# Patient Record
Sex: FEMALE | Race: WHITE | NOT HISPANIC OR LATINO | Employment: FULL TIME | URBAN - METROPOLITAN AREA
[De-identification: names, ages, dates, MRNs, and addresses within clinical notes are randomized per-mention and may not be internally consistent; named-entity substitution may affect disease eponyms.]

---

## 2017-06-22 ENCOUNTER — TRANSCRIBE ORDERS (OUTPATIENT)
Dept: RADIOLOGY | Facility: CLINIC | Age: 62
End: 2017-06-22

## 2017-06-22 ENCOUNTER — APPOINTMENT (OUTPATIENT)
Dept: RADIOLOGY | Facility: CLINIC | Age: 62
End: 2017-06-22
Payer: COMMERCIAL

## 2017-06-22 DIAGNOSIS — M25.552 LEFT HIP PAIN: Primary | ICD-10-CM

## 2017-06-22 PROCEDURE — 73503 X-RAY EXAM HIP UNI 4/> VIEWS: CPT

## 2019-08-19 ENCOUNTER — APPOINTMENT (OUTPATIENT)
Dept: RADIOLOGY | Facility: CLINIC | Age: 64
End: 2019-08-19
Payer: COMMERCIAL

## 2019-08-19 DIAGNOSIS — R05.9 COUGH: ICD-10-CM

## 2019-08-19 PROCEDURE — 71046 X-RAY EXAM CHEST 2 VIEWS: CPT

## 2022-01-11 ENCOUNTER — TELEPHONE (OUTPATIENT)
Dept: HEMATOLOGY ONCOLOGY | Facility: CLINIC | Age: 67
End: 2022-01-11

## 2022-01-11 NOTE — TELEPHONE ENCOUNTER
Soft Intake Form   Patient Details   Palomor Loge     1955     [id number]     Reason For Appointment   Who is Calling? Patient   If not patient, Name? Who is the Referring Doctor? Dr Amando Romero OB/GYN     What is the diagnosis? uterine cancer   Has this diagnosis been confirmed by a biopsy/surgery? If yes, what is the date it was done? Yes  1/04/2022     Biopsy done at [address]? If not, where was it done? No  [facility]  [address] [facility]  [address], [place], [address]     Was imaging done, and was it done at [address] [place] [address]? If not, where was it done? Yes  [address]  [address]  [facility], [facility]     Have you been seen by another Oncologist?  If so, who and where (name of facility, city and state) no   For 2nd Opinions Only: Are you currently undergoing treatment, or are you scheduled to start treatment? If yes, name of facility, city and state n/a    For "History Of" only: Have you completed treatment? n/a    Have you had Genetic Testing done in the past?  If so, advise to bring test results to their visit no   Record Gathering Information   Did you advise to have records faxed to [phone]? yes   Did you advise to have disks sent to the proper address with imaging? ("History of" Patients)  5 years of imaging for breast patients-Mammos, US etc Yes  [address]  1st floor   [place], [address] [facility]   Scheduling Information   What is the best call back number? (If the [facility] is calling, please use their number) [phone]   Miscellaneous Information      Patient calling in to schedule new patient appointment  Request appointment with Dr Antonio Ramsey in [place], offer first available appointment  Advise Patient she will need to have all records fax , provided fax number also provided address to send images    Schedule appointment 2/10/2022 1:30pm

## 2022-01-25 ENCOUNTER — TELEPHONE (OUTPATIENT)
Dept: GYNECOLOGIC ONCOLOGY | Facility: CLINIC | Age: 67
End: 2022-01-25

## 2022-01-25 NOTE — TELEPHONE ENCOUNTER
 Hello, can I please speak to (patient name) this is (enter your name here) calling from [facility]) to remind you of your appointment on (date and time) at (location)  I am calling to review our no-show/cancelation policy and complete your COVID screening questions  Do you have a few minutes? We ask that you come at least 15 minutes early for your appointment to complete all paperwork, if you are 20 minutes late for your appointment, we may need to reschedule you  We require at least 24-hour notice for cancelations and if you miss your appointment 3 times, we may unfortunately not be able to reschedule your future visit  Considering the current events related to the COVID-19 virus and in being proactive and making sure we are keeping our patients, their families and staff safe, we are screening prior to all patient appointments      1  Are you currently experiencing any symptoms of fever, cough, shortness of breath, chills, repeated shaking with chills, muscle pain, headache, sore throat, or new loss of taste/smell?   ? No - continue to the next question     2  Have you been tested for COVID-19 within the past 5 days? ?  No - continue with visit

## 2022-01-26 ENCOUNTER — CONSULT (OUTPATIENT)
Dept: GYNECOLOGIC ONCOLOGY | Facility: HOSPITAL | Age: 67
End: 2022-01-26
Payer: COMMERCIAL

## 2022-01-26 VITALS
TEMPERATURE: 98.3 F | DIASTOLIC BLOOD PRESSURE: 58 MMHG | HEIGHT: 64 IN | SYSTOLIC BLOOD PRESSURE: 168 MMHG | HEART RATE: 94 BPM | OXYGEN SATURATION: 97 % | RESPIRATION RATE: 17 BRPM

## 2022-01-26 DIAGNOSIS — C54.1 ENDOMETRIAL CANCER (HCC): Primary | ICD-10-CM

## 2022-01-26 PROBLEM — E11.9 TYPE 2 DIABETES MELLITUS WITHOUT COMPLICATION (HCC): Status: ACTIVE | Noted: 2022-01-26

## 2022-01-26 PROBLEM — E78.5 HYPERLIPIDEMIA ASSOCIATED WITH TYPE 2 DIABETES MELLITUS (HCC): Status: ACTIVE | Noted: 2022-01-26

## 2022-01-26 PROBLEM — E11.69 HYPERLIPIDEMIA ASSOCIATED WITH TYPE 2 DIABETES MELLITUS (HCC): Status: ACTIVE | Noted: 2022-01-26

## 2022-01-26 PROBLEM — E66.01 MORBID OBESITY WITH BMI OF 40.0-44.9, ADULT (HCC): Status: ACTIVE | Noted: 2022-01-26

## 2022-01-26 PROCEDURE — 99245 OFF/OP CONSLTJ NEW/EST HI 55: CPT | Performed by: OBSTETRICS & GYNECOLOGY

## 2022-01-26 RX ORDER — HEPARIN SODIUM 5000 [USP'U]/ML
5000 INJECTION, SOLUTION INTRAVENOUS; SUBCUTANEOUS
Status: CANCELLED | OUTPATIENT
Start: 2022-01-27 | End: 2022-01-28

## 2022-01-26 RX ORDER — GABAPENTIN 250 MG/5ML
SOLUTION ORAL 3 TIMES DAILY
COMMUNITY
Start: 2021-08-13 | End: 2022-05-24

## 2022-01-26 RX ORDER — OMEPRAZOLE 20 MG/1
CAPSULE, DELAYED RELEASE ORAL EVERY 24 HOURS
COMMUNITY

## 2022-01-26 RX ORDER — FEXOFENADINE HCL 180 MG/1
TABLET ORAL EVERY 24 HOURS
COMMUNITY

## 2022-01-26 RX ORDER — SODIUM CHLORIDE, SODIUM LACTATE, POTASSIUM CHLORIDE, CALCIUM CHLORIDE 600; 310; 30; 20 MG/100ML; MG/100ML; MG/100ML; MG/100ML
125 INJECTION, SOLUTION INTRAVENOUS CONTINUOUS
Status: CANCELLED | OUTPATIENT
Start: 2022-01-26

## 2022-01-26 RX ORDER — CEFAZOLIN SODIUM 2 G/50ML
2000 SOLUTION INTRAVENOUS ONCE
Status: CANCELLED | OUTPATIENT
Start: 2022-01-26 | End: 2022-01-26

## 2022-01-26 RX ORDER — ACETAMINOPHEN 325 MG/1
975 TABLET ORAL ONCE
Status: CANCELLED | OUTPATIENT
Start: 2022-01-26 | End: 2022-01-26

## 2022-01-26 RX ORDER — LISINOPRIL 20 MG/1
1 TABLET ORAL DAILY
COMMUNITY

## 2022-01-26 RX ORDER — FUROSEMIDE 40 MG/1
1 TABLET ORAL DAILY
COMMUNITY

## 2022-01-26 RX ORDER — BACILLUS COAGULANS/INULIN 1B-250 MG
CAPSULE ORAL
COMMUNITY

## 2022-01-26 NOTE — PATIENT INSTRUCTIONS
1  Nothing to eat or drink after midnight prior to the operation  You are allowed clear liquids until 3 hours prior to the scheduled start time of surgery  No milk products or solid food for 8 hours prior to surgery  2   Please avoid ibuprofen, aspirin, fish oil for 7 days prior to surgery  3  Please take your gabapentin, omeprazole the morning of surgery with a sip of water  4  Take half your nighttime dose of insulin the night before surgery  5  Stop metformin 24 hours prior to surgery      6  Do not take any of your other medications the morning of surgery

## 2022-01-26 NOTE — H&P
Assessment/Plan:    Problem List Items Addressed This Visit        Genitourinary    Endometrial cancer ([facility] [address] ) - Primary     70-year-old with morbid obesity, BMI 41 kilograms/meter squared, type 2 diabetes, prior open cholecystectomy and laparotomy for removal of ovarian cyst with biopsy-proven high-grade endometrial cancer  Her performance status is 0     1  CT of chest abdomen pelvis,  to evaluate for metastatic disease   2  I discussed the pathophysiology of endometrial cancer  Pathology review may help differentiate histologic subtype  3  We discussed treatment options including the risks and benefits of robotic assisted total laparoscopic hysterectomy, unilateral salpingo-oophorectomy, lymph node dissection, possible exploratory laparotomy and all other indicated procedures  She understands the risks and benefits of the operation agrees to proceed as outlined  Consent for surgery was obtained by me in the office  4  She understands that adjuvant therapy as prescribed based on final pathology and stage  She understands that there is a higher risk she may require adjuvant treatment with high-grade endometrial cancer  Treatment options include no further therapy, vaginal brachytherapy, external beam radiation plus or minus vaginal brachytherapy, chemotherapy  5  Preoperative medical risk evaluation  6  We discussed perioperative medication management  Thank you for the courtesy of this consultation  All questions were answered by the end of the visit             Relevant Orders    CT chest abdomen pelvis w contrast    Case request operating room: HYSTERECTOMY LAPAROSCOPIC TOTAL ([address] [place] [address]) W/ ROBOTICS (Completed)    Type and screen    Comprehensive metabolic panel    CBC and Platelet    APTT    Protime-INR    HEMOGLOBIN A1C W/ EAG ESTIMATION        EKG 12 lead              CHIEF COMPLAINT:  Biopsy-proven high-grade endometrial cancer          Patient ID: Deedee Todd is a 78 y o  female  70-year-old with morbid obesity, BMI 41 kilograms/meter squared, type 2 diabetes requiring insulin, prior open cholecystectomy, open oophorectomy presents as a consultation from Dr Cady Tran to discuss treatment options for biopsy-proven high-grade endometrial cancer  She noted postmenopausal bleeding approximately 1-2 months ago  She had a pelvic ultrasound on 12/22/2021 that demonstrated the uterus to measure 7 6 x 5 cm with endometrial thickness of 1 cm  There was a lobulated mass at the cervix measuring 2 4 cm  The ovaries were not seen  She then had a cervical polypectomy and endometrial biopsy performed on 1/4/2022 that revealed the high-grade endometrial cancer  She is no longer having bleeding after the cervical polypectomy  She has no pelvic pain  Review of Systems   Allergic/Immunologic: Positive for environmental allergies  Psychiatric/Behavioral: The patient is nervous/anxious  All other systems reviewed and are negative  Current Outpatient Medications   Medication Sig Dispense Refill    Bacillus Coagulans-Inulin (Probiotic) 1-250 BILLION-MG CAPS as directed      fexofenadine (Allegra Allergy) 180 MG tablet every 24 hours      furosemide (LASIX) 40 mg tablet Take 1 tablet by mouth daily      Gabapentin (NEURONTIN) 300 mg/6mL solution 3 (three) times a day      lisinopril (ZESTRIL) 20 mg tablet Take 1 tablet by mouth daily      lorazepam (ATIVAN) 0 5 mg/mL GEL transdermal gel Take 1 tablet by mouth daily at bedtime      metFORMIN (GLUCOPHAGE) 1000 MG tablet Take 1 tablet by mouth 2 (two) times a day with meals      omeprazole (PriLOSEC) 20 mg delayed release capsule every 24 hours      Rosuvastatin Calcium 20 MG CPSP Take 1 tablet by mouth daily      sertraline (ZOLOFT) 25 mg tablet Take 1 tablet by mouth daily      sitaGLIPtin (Januvia) 100 mg tablet Take 1 tablet by mouth daily       No current facility-administered medications for this visit         Allergies   Allergen Reactions    Empagliflozin Other (See Comments)    Exenatide Other (See Comments)    Glipizide Other (See Comments)    Repaglinide Other (See Comments)       Past Medical History:   Diagnosis Date    Hyperlipidemia associated with type 2 diabetes mellitus ([facility] )     Morbid obesity with BMI of 40 0-44 9, adult ([facility] )     Type 2 diabetes mellitus without complication (HCC)        Past Surgical History:   Procedure Laterality Date    CHOLECYSTECTOMY OPEN      SALPINGOOPHORECTOMY N/A        OB History        1    Para   1    Term                AB        Living           SAB        IAB        Ectopic        Multiple        Live Births                     No family history on file  The following portions of the patient's history were reviewed and updated as appropriate: allergies, current medications, past family history, past medical history, past social history, past surgical history and problem list       Objective:    Blood pressure 168/58, pulse 94, temperature 98 3 °F (36 8 °C), temperature source Temporal, resp  rate 17, height 5' 4" (1 626 m), SpO2 97 %  There is no height or weight on file to calculate BMI  Physical Exam  Vitals reviewed  Exam conducted with a chaperone present  Constitutional:       General: She is not in acute distress  Appearance: Normal appearance  She is well-developed  She is obese  She is not ill-appearing, toxic-appearing or diaphoretic  HENT:      Head: Normocephalic and atraumatic  Eyes:      General: No scleral icterus  Right eye: No discharge  Left eye: No discharge  Extraocular Movements: Extraocular movements intact  Conjunctiva/sclera: Conjunctivae normal    Neck:      Thyroid: No thyromegaly  Cardiovascular:      Rate and Rhythm: Regular rhythm  Tachycardia present  Heart sounds: Normal heart sounds  No murmur heard  No friction rub  No gallop  Pulmonary:      Effort: Pulmonary effort is normal  No respiratory distress  Breath sounds: Normal breath sounds  No stridor  No wheezing or rhonchi  Abdominal:      General: There is no distension  Palpations: Abdomen is soft  There is no mass  Tenderness: There is no abdominal tenderness  There is no guarding or rebound  Hernia: No hernia is present  Genitourinary:     Comments: The external female genitalia is normal  The bartholin's, uretheral and skenes glands are normal  The urethral meatus is normal (midline with no lesions)  Anus without fissure or lesion  Speculum exam reveals vagina without lesion or discharge  Cervix is normal appearing without visible lesions  No bleeding  No significant cystocele or rectocele noted  Bimanual exam notes a uterus with normal contour, mobility  Uterine size approximately 9 weeks  No tenderness  Adnexa without masses or tenderness  Bladder is without fullness, mass or tenderness  Musculoskeletal:         General: No tenderness, deformity or signs of injury  Cervical back: Normal range of motion and neck supple  No rigidity or tenderness  Right lower leg: Edema present  Left lower leg: Edema present  Comments: One to 2+ bilateral   Lymphadenopathy:      Cervical: No cervical adenopathy  Skin:     General: Skin is warm and dry  Coloration: Skin is not jaundiced or pale  Findings: No bruising, erythema, lesion or rash  Neurological:      General: No focal deficit present  Mental Status: She is alert and oriented to person, place, and time  Cranial Nerves: No cranial nerve deficit  Motor: No weakness  Gait: Gait normal    Psychiatric:         Mood and Affect: Mood normal          Behavior: Behavior normal          Thought Content:  Thought content normal          Judgment: Judgment normal

## 2022-01-26 NOTE — LETTER
January 26, 2022     Arlyn Shrestha [address]  [place] [address]  [address] [facility]    Patient: Deedee Todd   YOB: 1955   Date of Visit: 1/26/2022       Dear Dr Grace Bauer: Thank you for referring Marylen Cheek to me for evaluation  Below are my notes for this consultation  If you have questions, please do not hesitate to call me  I look forward to following your patient along with you  Sincerely,        Sana Kumar MD        CC: No Recipients  Sana Kumar MD  1/26/2022  3:49 PM  Sign when Signing Visit  Assessment/Plan:    Problem List Items Addressed This Visit        Genitourinary    Endometrial cancer [facility]) - Primary     58-year-old with morbid obesity, BMI 41 kilograms/meter squared, type 2 diabetes, prior open cholecystectomy and laparotomy for removal of ovarian cyst with biopsy-proven high-grade endometrial cancer  Her performance status is 0     1  CT of chest abdomen pelvis,  to evaluate for metastatic disease   2  I discussed the pathophysiology of endometrial cancer  Pathology review may help differentiate histologic subtype  3  We discussed treatment options including the risks and benefits of robotic assisted total laparoscopic hysterectomy, unilateral salpingo-oophorectomy, lymph node dissection, possible exploratory laparotomy and all other indicated procedures  She understands the risks and benefits of the operation agrees to proceed as outlined  Consent for surgery was obtained by me in the office  4  She understands that adjuvant therapy as prescribed based on final pathology and stage  She understands that there is a higher risk she may require adjuvant treatment with high-grade endometrial cancer  Treatment options include no further therapy, vaginal brachytherapy, external beam radiation plus or minus vaginal brachytherapy, chemotherapy  5  Preoperative medical risk evaluation  6   We discussed perioperative medication management  Thank you for the courtesy of this consultation  All questions were answered by the end of the visit  Relevant Orders    CT chest abdomen pelvis w contrast    Case request operating room: HYSTERECTOMY LAPAROSCOPIC TOTAL ([address]) W/ ROBOTICS (Completed)    Type and screen    Comprehensive metabolic panel    CBC and Platelet    APTT    Protime-INR    HEMOGLOBIN A1C W/ EAG ESTIMATION        EKG 12 lead              CHIEF COMPLAINT:  Biopsy-proven high-grade endometrial cancer          Patient ID: Rosalba Barreto is a 78 y o  female  40-year-old with morbid obesity, BMI 41 kilograms/meter squared, type 2 diabetes requiring insulin, prior open cholecystectomy, open oophorectomy presents as a consultation from Dr Niyah De La Cruz to discuss treatment options for biopsy-proven high-grade endometrial cancer  She noted postmenopausal bleeding approximately 1-2 months ago  She had a pelvic ultrasound on 12/22/2021 that demonstrated the uterus to measure 7 6 x 5 cm with endometrial thickness of 1 cm  There was a lobulated mass at the cervix measuring 2 4 cm  The ovaries were not seen  She then had a cervical polypectomy and endometrial biopsy performed on 1/4/2022 that revealed the high-grade endometrial cancer  She is no longer having bleeding after the cervical polypectomy  She has no pelvic pain  Review of Systems   Allergic/Immunologic: Positive for environmental allergies  Psychiatric/Behavioral: The patient is nervous/anxious  All other systems reviewed and are negative        Current Outpatient Medications   Medication Sig Dispense Refill    Bacillus Coagulans-Inulin (Probiotic) 1-250 BILLION-MG CAPS as directed      fexofenadine (Allegra Allergy) 180 MG tablet every 24 hours      furosemide (LASIX) 40 mg tablet Take 1 tablet by mouth daily      Gabapentin (NEURONTIN) 300 mg/6mL solution 3 (three) times a day      lisinopril (ZESTRIL) 20 mg tablet Take 1 tablet by mouth daily      lorazepam (ATIVAN) 0 5 mg/mL GEL transdermal gel Take 1 tablet by mouth daily at bedtime      metFORMIN (GLUCOPHAGE) 1000 MG tablet Take 1 tablet by mouth 2 (two) times a day with meals      omeprazole (PriLOSEC) 20 mg delayed release capsule every 24 hours      Rosuvastatin Calcium 20 MG CPSP Take 1 tablet by mouth daily      sertraline (ZOLOFT) 25 mg tablet Take 1 tablet by mouth daily      sitaGLIPtin (Januvia) 100 mg tablet Take 1 tablet by mouth daily       No current facility-administered medications for this visit  Allergies   Allergen Reactions    Empagliflozin Other (See Comments)    Exenatide Other (See Comments)    Glipizide Other (See Comments)    Repaglinide Other (See Comments)       Past Medical History:   Diagnosis Date    Hyperlipidemia associated with type 2 diabetes mellitus ([place] [address] )     Morbid obesity with BMI of 40 0-44 9, adult ([place] [address] )     Type 2 diabetes mellitus without complication (HCC)        Past Surgical History:   Procedure Laterality Date    CHOLECYSTECTOMY OPEN      SALPINGOOPHORECTOMY N/A        OB History        1    Para   1    Term                AB        Living           SAB        IAB        Ectopic        Multiple        Live Births                     No family history on file  The following portions of the patient's history were reviewed and updated as appropriate: allergies, current medications, past family history, past medical history, past social history, past surgical history and problem list       Objective:    Blood pressure 168/58, pulse 94, temperature 98 3 °F (36 8 °C), temperature source Temporal, resp  rate 17, height 5' 4" (1 626 m), SpO2 97 %  There is no height or weight on file to calculate BMI  Physical Exam  Vitals reviewed  Exam conducted with a chaperone present  Constitutional:       General: She is not in acute distress  Appearance: Normal appearance  She is well-developed  She is obese   She is not ill-appearing, toxic-appearing or diaphoretic  HENT:      Head: Normocephalic and atraumatic  Eyes:      General: No scleral icterus  Right eye: No discharge  Left eye: No discharge  Extraocular Movements: Extraocular movements intact  Conjunctiva/sclera: Conjunctivae normal    Neck:      Thyroid: No thyromegaly  Cardiovascular:      Rate and Rhythm: Regular rhythm  Tachycardia present  Heart sounds: Normal heart sounds  No murmur heard  No friction rub  No gallop  Pulmonary:      Effort: Pulmonary effort is normal  No respiratory distress  Breath sounds: Normal breath sounds  No stridor  No wheezing or rhonchi  Abdominal:      General: There is no distension  Palpations: Abdomen is soft  There is no mass  Tenderness: There is no abdominal tenderness  There is no guarding or rebound  Hernia: No hernia is present  Genitourinary:     Comments: The external female genitalia is normal  The bartholin's, uretheral and skenes glands are normal  The urethral meatus is normal (midline with no lesions)  Anus without fissure or lesion  Speculum exam reveals vagina without lesion or discharge  Cervix is normal appearing without visible lesions  No bleeding  No significant cystocele or rectocele noted  Bimanual exam notes a uterus with normal contour, mobility  Uterine size approximately 9 weeks  No tenderness  Adnexa without masses or tenderness  Bladder is without fullness, mass or tenderness  Musculoskeletal:         General: No tenderness, deformity or signs of injury  Cervical back: Normal range of motion and neck supple  No rigidity or tenderness  Right lower leg: Edema present  Left lower leg: Edema present  Comments: One to 2+ bilateral   Lymphadenopathy:      Cervical: No cervical adenopathy  Skin:     General: Skin is warm and dry  Coloration: Skin is not jaundiced or pale        Findings: No bruising, erythema, lesion or rash    Neurological:      General: No focal deficit present  Mental Status: She is alert and oriented to person, place, and time  Cranial Nerves: No cranial nerve deficit  Motor: No weakness  Gait: Gait normal    Psychiatric:         Mood and Affect: Mood normal          Behavior: Behavior normal          Thought Content:  Thought content normal          Judgment: Judgment normal

## 2022-01-26 NOTE — ASSESSMENT & PLAN NOTE
48-year-old with morbid obesity, BMI 41 kilograms/meter squared, type 2 diabetes, prior open cholecystectomy and laparotomy for removal of ovarian cyst with biopsy-proven high-grade endometrial cancer  Her performance status is 0     1  CT of chest abdomen pelvis,  to evaluate for metastatic disease   2  I discussed the pathophysiology of endometrial cancer  Pathology review may help differentiate histologic subtype  3  We discussed treatment options including the risks and benefits of robotic assisted total laparoscopic hysterectomy, unilateral salpingo-oophorectomy, lymph node dissection, possible exploratory laparotomy and all other indicated procedures  She understands the risks and benefits of the operation agrees to proceed as outlined  Consent for surgery was obtained by me in the office  4  She understands that adjuvant therapy as prescribed based on final pathology and stage  She understands that there is a higher risk she may require adjuvant treatment with high-grade endometrial cancer  Treatment options include no further therapy, vaginal brachytherapy, external beam radiation plus or minus vaginal brachytherapy, chemotherapy  5  Preoperative medical risk evaluation  6  We discussed perioperative medication management  Thank you for the courtesy of this consultation  All questions were answered by the end of the visit

## 2022-01-26 NOTE — PROGRESS NOTES
Assessment/Plan:    Problem List Items Addressed This Visit        Genitourinary    Endometrial cancer ([facility] ) - Primary     68-year-old with morbid obesity, BMI 41 kilograms/meter squared, type 2 diabetes, prior open cholecystectomy and laparotomy for removal of ovarian cyst with biopsy-proven high-grade endometrial cancer  Her performance status is 0     1  CT of chest abdomen pelvis,  to evaluate for metastatic disease   2  I discussed the pathophysiology of endometrial cancer  Pathology review may help differentiate histologic subtype  3  We discussed treatment options including the risks and benefits of robotic assisted total laparoscopic hysterectomy, unilateral salpingo-oophorectomy, lymph node dissection, possible exploratory laparotomy and all other indicated procedures  She understands the risks and benefits of the operation agrees to proceed as outlined  Consent for surgery was obtained by me in the office  4  She understands that adjuvant therapy as prescribed based on final pathology and stage  She understands that there is a higher risk she may require adjuvant treatment with high-grade endometrial cancer  Treatment options include no further therapy, vaginal brachytherapy, external beam radiation plus or minus vaginal brachytherapy, chemotherapy  5  Preoperative medical risk evaluation  6  We discussed perioperative medication management  Thank you for the courtesy of this consultation  All questions were answered by the end of the visit             Relevant Orders    CT chest abdomen pelvis w contrast    Case request operating room: HYSTERECTOMY LAPAROSCOPIC TOTAL ([address] [place] [address]) W/ ROBOTICS (Completed)    Type and screen    Comprehensive metabolic panel    CBC and Platelet    APTT    Protime-INR    HEMOGLOBIN A1C W/ EAG ESTIMATION        EKG 12 lead              CHIEF COMPLAINT:  Biopsy-proven high-grade endometrial cancer          Patient ID: Nelwyn Joe is a 78 y o  female  68-year-old with morbid obesity, BMI 41 kilograms/meter squared, type 2 diabetes requiring insulin, prior open cholecystectomy, open oophorectomy presents as a consultation from Dr Gerda Puckett to discuss treatment options for biopsy-proven high-grade endometrial cancer  She noted postmenopausal bleeding approximately 1-2 months ago  She had a pelvic ultrasound on 12/22/2021 that demonstrated the uterus to measure 7 6 x 5 cm with endometrial thickness of 1 cm  There was a lobulated mass at the cervix measuring 2 4 cm  The ovaries were not seen  She then had a cervical polypectomy and endometrial biopsy performed on 1/4/2022 that revealed the high-grade endometrial cancer  She is no longer having bleeding after the cervical polypectomy  She has no pelvic pain  Review of Systems   Allergic/Immunologic: Positive for environmental allergies  Psychiatric/Behavioral: The patient is nervous/anxious  All other systems reviewed and are negative  Current Outpatient Medications   Medication Sig Dispense Refill    Bacillus Coagulans-Inulin (Probiotic) 1-250 BILLION-MG CAPS as directed      fexofenadine (Allegra Allergy) 180 MG tablet every 24 hours      furosemide (LASIX) 40 mg tablet Take 1 tablet by mouth daily      Gabapentin (NEURONTIN) 300 mg/6mL solution 3 (three) times a day      lisinopril (ZESTRIL) 20 mg tablet Take 1 tablet by mouth daily      lorazepam (ATIVAN) 0 5 mg/mL GEL transdermal gel Take 1 tablet by mouth daily at bedtime      metFORMIN (GLUCOPHAGE) 1000 MG tablet Take 1 tablet by mouth 2 (two) times a day with meals      omeprazole (PriLOSEC) 20 mg delayed release capsule every 24 hours      Rosuvastatin Calcium 20 MG CPSP Take 1 tablet by mouth daily      sertraline (ZOLOFT) 25 mg tablet Take 1 tablet by mouth daily      sitaGLIPtin (Januvia) 100 mg tablet Take 1 tablet by mouth daily       No current facility-administered medications for this visit         Allergies   Allergen Reactions    Empagliflozin Other (See Comments)    Exenatide Other (See Comments)    Glipizide Other (See Comments)    Repaglinide Other (See Comments)       Past Medical History:   Diagnosis Date    Hyperlipidemia associated with type 2 diabetes mellitus ([facility] )     Morbid obesity with BMI of 40 0-44 9, adult ([facility] )     Type 2 diabetes mellitus without complication (HCC)        Past Surgical History:   Procedure Laterality Date    CHOLECYSTECTOMY OPEN      SALPINGOOPHORECTOMY N/A        OB History        1    Para   1    Term                AB        Living           SAB        IAB        Ectopic        Multiple        Live Births                     No family history on file  The following portions of the patient's history were reviewed and updated as appropriate: allergies, current medications, past family history, past medical history, past social history, past surgical history and problem list       Objective:    Blood pressure 168/58, pulse 94, temperature 98 3 °F (36 8 °C), temperature source Temporal, resp  rate 17, height 5' 4" (1 626 m), SpO2 97 %  There is no height or weight on file to calculate BMI  Physical Exam  Vitals reviewed  Exam conducted with a chaperone present  Constitutional:       General: She is not in acute distress  Appearance: Normal appearance  She is well-developed  She is obese  She is not ill-appearing, toxic-appearing or diaphoretic  HENT:      Head: Normocephalic and atraumatic  Eyes:      General: No scleral icterus  Right eye: No discharge  Left eye: No discharge  Extraocular Movements: Extraocular movements intact  Conjunctiva/sclera: Conjunctivae normal    Neck:      Thyroid: No thyromegaly  Cardiovascular:      Rate and Rhythm: Regular rhythm  Tachycardia present  Heart sounds: Normal heart sounds  No murmur heard  No friction rub  No gallop  Pulmonary:      Effort: Pulmonary effort is normal  No respiratory distress  Breath sounds: Normal breath sounds  No stridor  No wheezing or rhonchi  Abdominal:      General: There is no distension  Palpations: Abdomen is soft  There is no mass  Tenderness: There is no abdominal tenderness  There is no guarding or rebound  Hernia: No hernia is present  Genitourinary:     Comments: The external female genitalia is normal  The bartholin's, uretheral and skenes glands are normal  The urethral meatus is normal (midline with no lesions)  Anus without fissure or lesion  Speculum exam reveals vagina without lesion or discharge  Cervix is normal appearing without visible lesions  No bleeding  No significant cystocele or rectocele noted  Bimanual exam notes a uterus with normal contour, mobility  Uterine size approximately 9 weeks  No tenderness  Adnexa without masses or tenderness  Bladder is without fullness, mass or tenderness  Musculoskeletal:         General: No tenderness, deformity or signs of injury  Cervical back: Normal range of motion and neck supple  No rigidity or tenderness  Right lower leg: Edema present  Left lower leg: Edema present  Comments: One to 2+ bilateral   Lymphadenopathy:      Cervical: No cervical adenopathy  Skin:     General: Skin is warm and dry  Coloration: Skin is not jaundiced or pale  Findings: No bruising, erythema, lesion or rash  Neurological:      General: No focal deficit present  Mental Status: She is alert and oriented to person, place, and time  Cranial Nerves: No cranial nerve deficit  Motor: No weakness  Gait: Gait normal    Psychiatric:         Mood and Affect: Mood normal          Behavior: Behavior normal          Thought Content:  Thought content normal          Judgment: Judgment normal

## 2022-02-07 ENCOUNTER — HOSPITAL ENCOUNTER (OUTPATIENT)
Dept: CT IMAGING | Facility: HOSPITAL | Age: 67
Discharge: HOME | End: 2022-02-07
Attending: OBSTETRICS & GYNECOLOGY
Payer: COMMERCIAL

## 2022-02-07 DIAGNOSIS — C54.1 ENDOMETRIAL CANCER (HCC): ICD-10-CM

## 2022-02-07 PROCEDURE — 74177 CT ABD & PELVIS W/CONTRAST: CPT

## 2022-02-07 PROCEDURE — 71260 CT THORAX DX C+: CPT

## 2022-02-07 PROCEDURE — G1004 CDSM NDSC: HCPCS

## 2022-02-07 RX ADMIN — IOHEXOL 100 ML: 350 INJECTION, SOLUTION INTRAVENOUS at 15:52

## 2022-02-17 ENCOUNTER — OFFICE VISIT (OUTPATIENT)
Dept: GYNECOLOGIC ONCOLOGY | Facility: HOSPITAL | Age: 67
End: 2022-02-17
Payer: COMMERCIAL

## 2022-02-17 VITALS
OXYGEN SATURATION: 98 % | DIASTOLIC BLOOD PRESSURE: 78 MMHG | BODY MASS INDEX: 36.64 KG/M2 | HEART RATE: 89 BPM | HEIGHT: 64 IN | SYSTOLIC BLOOD PRESSURE: 136 MMHG | WEIGHT: 214.6 LBS | TEMPERATURE: 97.7 F

## 2022-02-17 DIAGNOSIS — C54.1 ENDOMETRIAL CANCER (HCC): Primary | ICD-10-CM

## 2022-02-17 PROCEDURE — 99215 OFFICE O/P EST HI 40 MIN: CPT | Performed by: OBSTETRICS & GYNECOLOGY

## 2022-02-17 NOTE — LETTER
February 17, 2022     Camila Dunlap DO  [address]  [address] [address]    Patient: Julio César Lord   YOB: 1955   Date of Visit: 2/17/2022       Dear Dr Noreen Bang:    Thank you for referring Sulema Polanco to me for evaluation  Below are my notes for this consultation  If you have questions, please do not hesitate to call me  I look forward to following your patient along with you  Sincerely,        Denver Kitten, MD        CC: Birtha Southerly, DO Denver Kitten, MD  2/17/2022 10:52 AM  Sign when Signing Visit  Assessment/Plan:    Problem List Items Addressed This Visit        Genitourinary    Endometrial cancer [facility]) - Primary     14-year-old with stage IV B high-grade endometrial cancer, liver, mediastinal lymph node, possible pulmonary, retroperitoneal lymph node metastatic disease based on CT imaging from 2/7/2022  She does not have heavy vaginal bleeding  Her performance status is 0   1  We discussed the imaging findings in detail  2   Given the extra pelvic disease, I discussed cancelling her scheduled surgery for now and proceeding with systemic therapy  3  I discussed the risks and benefits of starting carboplatin at AUC 6 and Taxol 175 milligrams/meter squared to be administered every 21 days for at least 3 cycles followed by repeat CT imaging to assess disease response  She understands the risks and benefits of treatment including pancytopenia, alopecia, neuropathy, allergic reaction, hepatic and renal damage, nausea and vomiting and she agrees to proceed as outlined  Consent for treatment was obtained by me in the office  4  Will send biopsy for genomic testing/IHC  5  Baseline labs including , referral to interventional Radiology for MediPort placement           Relevant Orders    Ambulatory referral to Interventional Radiology        CBC and differential    Comprehensive metabolic panel    Magnesium            CHIEF COMPLAINT:  Treatment discussion      Problem:  Cancer Staging  Endometrial cancer ([place] [address] )  Staging form: Corpus Uteri - Carcinoma, AJCC 8th Edition  - Clinical: FIGO Stage IVB (cM1) - Signed by Tatiana Gill MD on 2/17/2022        Previous therapy:  Oncology History   Endometrial cancer ([place] [address] )   1/26/2022 Initial Diagnosis    Endometrial cancer ([place] [address] )     2/17/2022 -  Cancer Staged    Staging form: Corpus Uteri - Carcinoma, AJCC 8th Edition  - Clinical: FIGO Stage IVB (cM1) - Signed by Tatiana Gill MD on 2/17/2022  Histologic grade (G): G3  Histologic grading system: 3 grade system       2/24/2022 -  Chemotherapy    palonosetron (ALOXI), 0 25 mg, Intravenous, Once, 0 of 6 cycles  fosaprepitant (EMEND) IVPB, 150 mg, Intravenous, Once, 0 of 6 cycles  CARBOplatin (PARAPLATIN) IVPB (GOG AUC DOSING), , Intravenous, Once, 0 of 6 cycles  PACLItaxel (TAXOL) chemo IVPB, 175 mg/m2, Intravenous, Once, 0 of 6 cycles           Patient ID: Nanette Diaz is a 78 y o  female  Returns for treatment discussion  She was scheduled for robotic assisted total laparoscopic hysterectomy, bilateral salpingo-oophorectomy and staging  Preoperative CT imaging of the chest abdomen pelvis revealed small pulmonary nodules, mediastinal lymphadenopathy, retroperitoneal lymphadenopathy measuring at largest 2 4 cm, liver metastatic disease  She is having minimal vaginal bleeding  No pelvic pain  No other interval change in her medications or medical history since her last visit  The following portions of the patient's history were reviewed and updated as appropriate: allergies, current medications, past family history, past medical history, past social history, past surgical history and problem list     Review of Systems   Constitutional: Negative for activity change and unexpected weight change  HENT: Negative  Eyes: Negative  Respiratory: Negative  Cardiovascular: Negative      Gastrointestinal: Negative for abdominal distention and abdominal pain  Endocrine: Negative  Genitourinary: Negative for pelvic pain and vaginal bleeding  Musculoskeletal: Negative  Skin: Negative  Allergic/Immunologic: Negative  Neurological: Negative  Hematological: Negative  Psychiatric/Behavioral: The patient is nervous/anxious  Current Outpatient Medications   Medication Sig Dispense Refill    Bacillus Coagulans-Inulin (Probiotic) 1-250 BILLION-MG CAPS as directed      fexofenadine (Allegra Allergy) 180 MG tablet every 24 hours      furosemide (LASIX) 40 mg tablet Take 1 tablet by mouth daily      Gabapentin (NEURONTIN) 300 mg/6mL solution 3 (three) times a day      lisinopril (ZESTRIL) 20 mg tablet Take 1 tablet by mouth daily      lorazepam (ATIVAN) 0 5 mg/mL GEL transdermal gel Take 1 tablet by mouth daily at bedtime      metFORMIN (GLUCOPHAGE) 1000 MG tablet Take 1 tablet by mouth 2 (two) times a day with meals      omeprazole (PriLOSEC) 20 mg delayed release capsule every 24 hours      Rosuvastatin Calcium 20 MG CPSP Take 1 tablet by mouth daily      sertraline (ZOLOFT) 25 mg tablet Take 1 tablet by mouth daily      sitaGLIPtin (Januvia) 100 mg tablet Take 1 tablet by mouth daily       No current facility-administered medications for this visit  Objective:    Blood pressure 136/78, pulse 89, temperature 97 7 °F (36 5 °C), temperature source Temporal, height 5' 4" (1 626 m), weight 97 3 kg (214 lb 9 6 oz), SpO2 98 %  Body mass index is 36 84 kg/m²  Body surface area is 2 02 meters squared  Physical Exam  Vitals reviewed  Constitutional:       General: She is not in acute distress  Appearance: Normal appearance  She is not ill-appearing  HENT:      Head: Normocephalic and atraumatic  Eyes:      General: No scleral icterus  Right eye: No discharge  Left eye: No discharge        Conjunctiva/sclera: Conjunctivae normal    Pulmonary:      Effort: Pulmonary effort is normal    Skin:     General: Skin is warm and dry  Coloration: Skin is not jaundiced  Findings: No rash  Neurological:      General: No focal deficit present  Mental Status: She is alert and oriented to person, place, and time  Cranial Nerves: No cranial nerve deficit  Sensory: No sensory deficit  Motor: No weakness  Gait: Gait normal    Psychiatric:         Mood and Affect: Mood normal          Behavior: Behavior normal          Thought Content:  Thought content normal          Judgment: Judgment normal

## 2022-02-17 NOTE — ASSESSMENT & PLAN NOTE
41-year-old with stage IV B high-grade endometrial cancer, liver, mediastinal lymph node, possible pulmonary, retroperitoneal lymph node metastatic disease based on CT imaging from 2/7/2022  She does not have heavy vaginal bleeding  Her performance status is 0   1  We discussed the imaging findings in detail  2   Given the extra pelvic disease, I discussed cancelling her scheduled surgery for now and proceeding with systemic therapy  3  I discussed the risks and benefits of starting carboplatin at AUC 6 and Taxol 175 milligrams/meter squared to be administered every 21 days for at least 3 cycles followed by repeat CT imaging to assess disease response  She understands the risks and benefits of treatment including pancytopenia, alopecia, neuropathy, allergic reaction, hepatic and renal damage, nausea and vomiting and she agrees to proceed as outlined  Consent for treatment was obtained by me in the office  4  Will send biopsy for genomic testing/IHC  5  Baseline labs including , referral to interventional Radiology for MediPort placement

## 2022-02-17 NOTE — PROGRESS NOTES
Assessment/Plan:    Problem List Items Addressed This Visit        Genitourinary    Endometrial [facility] ([facility] ) - Primary     79-year-old with stage IV B high-grade endometrial cancer, liver, mediastinal lymph node, possible pulmonary, retroperitoneal lymph node metastatic disease based on CT imaging from 2/7/2022  She does not have heavy vaginal bleeding  Her performance status is 0   1  We discussed the imaging findings in detail  2   Given the extra pelvic disease, I discussed cancelling her scheduled surgery for now and proceeding with systemic therapy  3  I discussed the risks and benefits of starting carboplatin at AUC 6 and Taxol 175 milligrams/meter squared to be administered every 21 days for at least 3 cycles followed by repeat CT imaging to assess disease response  She understands the risks and benefits of treatment including pancytopenia, alopecia, neuropathy, allergic reaction, hepatic and renal damage, nausea and vomiting and she agrees to proceed as outlined  Consent for treatment was obtained by me in the office  4  Will send biopsy for genomic testing/IHC  5  Baseline labs including , referral to interventional Radiology for MediPort placement           Relevant Orders    Ambulatory referral to Interventional Radiology        CBC and differential    Comprehensive metabolic panel    Magnesium            CHIEF COMPLAINT:  Treatment discussion      Problem:  Cancer Staging  Endometrial cancer [facility])  Staging form: Corpus Uteri - Carcinoma, AJCC 8th Edition  - Clinical: FIGO Stage IVB (cM1) - Signed by Kiran Ortega MD on 2/17/2022        Previous therapy:  Oncology History   Endometrial cancer ([facility] )   1/26/2022 Initial Diagnosis    [facility]omet[facility] ([facility] )     2/17/2022 -  Cancer Staged    Staging form: Corpus Uteri - Carcinoma, AJCC 8th Edition  - Clinical: FIGO Stage IVB (cM1) - Signed by Kiran Ortega MD on 2/17/2022  Histologic grade (G): G3  Histologic grading system: 3 grade system       2/24/2022 -  Chemotherapy    palonosetron (ALOXI), 0 25 mg, Intravenous, Once, 0 of 6 cycles  fosaprepitant (EMEND) IVPB, 150 mg, Intravenous, Once, 0 of 6 cycles  CARBOplatin (PARAPLATIN) IVPB (GOG AUC DOSING), , Intravenous, Once, 0 of 6 cycles  PACLItaxel (TAXOL) chemo IVPB, 175 mg/m2, Intravenous, Once, 0 of 6 cycles           Patient ID: Priya Rahman is a 78 y o  female  Returns for treatment discussion  She was scheduled for robotic assisted total laparoscopic hysterectomy, bilateral salpingo-oophorectomy and staging  Preoperative CT imaging of the chest abdomen pelvis revealed small pulmonary nodules, mediastinal lymphadenopathy, retroperitoneal lymphadenopathy measuring at largest 2 4 cm, liver metastatic disease  She is having minimal vaginal bleeding  No pelvic pain  No other interval change in her medications or medical history since her last visit  The following portions of the patient's history were reviewed and updated as appropriate: allergies, current medications, past family history, past medical history, past social history, past surgical history and problem list     Review of Systems   Constitutional: Negative for activity change and unexpected weight change  HENT: Negative  Eyes: Negative  Respiratory: Negative  Cardiovascular: Negative  Gastrointestinal: Negative for abdominal distention and abdominal pain  Endocrine: Negative  Genitourinary: Negative for pelvic pain and vaginal bleeding  Musculoskeletal: Negative  Skin: Negative  Allergic/Immunologic: Negative  Neurological: Negative  Hematological: Negative  Psychiatric/Behavioral: The patient is nervous/anxious          Current Outpatient Medications   Medication Sig Dispense Refill    Bacillus Coagulans-Inulin (Probiotic) 1-250 BILLION-MG CAPS as directed      fexofenadine (Allegra Allergy) 180 MG tablet every 24 hours      furosemide (LASIX) 40 mg tablet Take 1 tablet by mouth daily      Gabapentin (NEURONTIN) 300 mg/6mL solution 3 (three) times a day      lisinopril (ZESTRIL) 20 mg tablet Take 1 tablet by mouth daily      lorazepam (ATIVAN) 0 5 mg/mL GEL transdermal gel Take 1 tablet by mouth daily at bedtime      metFORMIN (GLUCOPHAGE) 1000 MG tablet Take 1 tablet by mouth 2 (two) times a day with meals      omeprazole (PriLOSEC) 20 mg delayed release capsule every 24 hours      Rosuvastatin Calcium 20 MG CPSP Take 1 tablet by mouth daily      sertraline (ZOLOFT) 25 mg tablet Take 1 tablet by mouth daily      sitaGLIPtin (Januvia) 100 mg tablet Take 1 tablet by mouth daily       No current facility-administered medications for this visit  Objective:    Blood pressure 136/78, pulse 89, temperature 97 7 °F (36 5 °C), temperature source Temporal, height 5' 4" (1 626 m), weight 97 3 kg (214 lb 9 6 oz), SpO2 98 %  Body mass index is 36 84 kg/m²  Body surface area is 2 02 meters squared  Physical Exam  Vitals reviewed  Constitutional:       General: She is not in acute distress  Appearance: Normal appearance  She is not ill-appearing  HENT:      Head: Normocephalic and atraumatic  Eyes:      General: No scleral icterus  Right eye: No discharge  Left eye: No discharge  Conjunctiva/sclera: Conjunctivae normal    Pulmonary:      Effort: Pulmonary effort is normal    Skin:     General: Skin is warm and dry  Coloration: Skin is not jaundiced  Findings: No rash  Neurological:      General: No focal deficit present  Mental Status: She is alert and oriented to person, place, and time  Cranial Nerves: No cranial nerve deficit  Sensory: No sensory deficit  Motor: No weakness  Gait: Gait normal    Psychiatric:         Mood and Affect: Mood normal          Behavior: Behavior normal          Thought Content:  Thought content normal          Judgment: Judgment normal

## 2022-02-18 ENCOUNTER — TELEPHONE (OUTPATIENT)
Dept: GYNECOLOGIC ONCOLOGY | Facility: CLINIC | Age: 67
End: 2022-02-18

## 2022-02-18 DIAGNOSIS — C54.1 ENDOMETRIAL CANCER (HCC): Primary | ICD-10-CM

## 2022-02-18 RX ORDER — ONDANSETRON HYDROCHLORIDE 8 MG/1
8 TABLET, FILM COATED ORAL EVERY 8 HOURS PRN
Qty: 20 TABLET | Refills: 1 | Status: SHIPPED | OUTPATIENT
Start: 2022-02-18 | End: 2022-07-26 | Stop reason: SDUPTHER

## 2022-02-22 DIAGNOSIS — C54.1 ENDOMETRIAL CANCER (HCC): Primary | ICD-10-CM

## 2022-02-23 RX ORDER — PALONOSETRON 0.05 MG/ML
0.25 INJECTION, SOLUTION INTRAVENOUS ONCE
Status: CANCELLED | OUTPATIENT
Start: 2022-02-24

## 2022-02-23 RX ORDER — SODIUM CHLORIDE 9 MG/ML
20 INJECTION, SOLUTION INTRAVENOUS ONCE
Status: CANCELLED | OUTPATIENT
Start: 2022-02-24

## 2022-02-24 ENCOUNTER — HOSPITAL ENCOUNTER (OUTPATIENT)
Dept: INFUSION CENTER | Facility: HOSPITAL | Age: 67
Discharge: HOME | End: 2022-02-24
Attending: OBSTETRICS & GYNECOLOGY
Payer: COMMERCIAL

## 2022-02-24 VITALS
DIASTOLIC BLOOD PRESSURE: 63 MMHG | RESPIRATION RATE: 16 BRPM | BODY MASS INDEX: 43.13 KG/M2 | TEMPERATURE: 97.8 F | OXYGEN SATURATION: 98 % | SYSTOLIC BLOOD PRESSURE: 146 MMHG | WEIGHT: 234.35 LBS | HEIGHT: 62 IN | HEART RATE: 89 BPM

## 2022-02-24 DIAGNOSIS — Z78.9 NEED FOR FOLLOW-UP BY SOCIAL WORKER: Primary | ICD-10-CM

## 2022-02-24 DIAGNOSIS — C54.1 ENDOMETRIAL CANCER (HCC): Primary | ICD-10-CM

## 2022-02-24 DIAGNOSIS — C54.1 ENDOMETRIAL CANCER (HCC): ICD-10-CM

## 2022-02-24 PROCEDURE — 96417 CHEMO IV INFUS EACH ADDL SEQ: CPT

## 2022-02-24 PROCEDURE — 96415 CHEMO IV INFUSION ADDL HR: CPT

## 2022-02-24 PROCEDURE — 96375 TX/PRO/DX INJ NEW DRUG ADDON: CPT

## 2022-02-24 PROCEDURE — 96367 TX/PROPH/DG ADDL SEQ IV INF: CPT

## 2022-02-24 PROCEDURE — 96413 CHEMO IV INFUSION 1 HR: CPT

## 2022-02-24 RX ORDER — PALONOSETRON 0.05 MG/ML
0.25 INJECTION, SOLUTION INTRAVENOUS ONCE
Status: COMPLETED | OUTPATIENT
Start: 2022-02-24 | End: 2022-02-24

## 2022-02-24 RX ORDER — SENNOSIDES 8.6 MG
1300 CAPSULE ORAL 2 TIMES DAILY
COMMUNITY

## 2022-02-24 RX ORDER — SODIUM CHLORIDE 9 MG/ML
20 INJECTION, SOLUTION INTRAVENOUS ONCE
Status: COMPLETED | OUTPATIENT
Start: 2022-02-24 | End: 2022-02-24

## 2022-02-24 RX ADMIN — FAMOTIDINE 20 MG: 10 INJECTION INTRAVENOUS at 09:57

## 2022-02-24 RX ADMIN — SODIUM CHLORIDE 20 ML/HR: 0.9 INJECTION, SOLUTION INTRAVENOUS at 09:10

## 2022-02-24 RX ADMIN — CARBOPLATIN 562.2 MG: 10 INJECTION, SOLUTION INTRAVENOUS at 14:13

## 2022-02-24 RX ADMIN — DIPHENHYDRAMINE HYDROCHLORIDE 25 MG: 50 INJECTION, SOLUTION INTRAMUSCULAR; INTRAVENOUS at 09:34

## 2022-02-24 RX ADMIN — PACLITAXEL 353.4 MG: 6 INJECTION, SOLUTION INTRAVENOUS at 10:57

## 2022-02-24 RX ADMIN — SODIUM CHLORIDE 150 MG: 9 INJECTION, SOLUTION INTRAVENOUS at 10:21

## 2022-02-24 RX ADMIN — DEXAMETHASONE SODIUM PHOSPHATE 20 MG: 10 INJECTION, SOLUTION INTRAMUSCULAR; INTRAVENOUS at 09:11

## 2022-02-24 RX ADMIN — PALONOSETRON 0.25 MG: 0.05 INJECTION, SOLUTION INTRAVENOUS at 09:11

## 2022-02-24 NOTE — PROGRESS NOTES
Patient completed chemotherapy infusion with no adverse reactions  PIV site removed, dressing CD&I  AVS provided, patient left unit ambulatory with steady gait

## 2022-02-28 ENCOUNTER — TELEPHONE (OUTPATIENT)
Dept: SURGICAL ONCOLOGY | Facility: CLINIC | Age: 67
End: 2022-02-28

## 2022-02-28 NOTE — TELEPHONE ENCOUNTER
Patient called stating she is experiencing pain and experiencing "twinges"  Flower Fry can be contacted at [phone]

## 2022-03-01 ENCOUNTER — TELEPHONE (OUTPATIENT)
Dept: GYNECOLOGIC ONCOLOGY | Facility: CLINIC | Age: 67
End: 2022-03-01

## 2022-03-01 DIAGNOSIS — M89.8X9 BONE PAIN: Primary | ICD-10-CM

## 2022-03-01 LAB
ALBUMIN SERPL-MCNC: 4.1 G/DL (ref 3.8–4.8)
ALBUMIN/GLOB SERPL: 2.2 {RATIO} (ref 1.2–2.2)
ALP SERPL-CCNC: 87 IU/L (ref 44–121)
ALT SERPL-CCNC: 59 IU/L (ref 0–32)
AST SERPL-CCNC: 38 IU/L (ref 0–40)
BASOPHILS # BLD AUTO: 0 X10E3/UL (ref 0–0.2)
BASOPHILS NFR BLD AUTO: 0 %
BILIRUB SERPL-MCNC: 0.3 MG/DL (ref 0–1.2)
BUN SERPL-MCNC: 18 MG/DL (ref 8–27)
BUN/CREAT SERPL: 23 (ref 12–28)
CALCIUM SERPL-MCNC: 9.6 MG/DL (ref 8.7–10.3)
CANCER AG125 SERPL-ACNC: 10.9 U/ML (ref 0–38.1)
CHLORIDE SERPL-SCNC: 98 MMOL/L (ref 96–106)
CO2 SERPL-SCNC: 22 MMOL/L (ref 20–29)
CREAT SERPL-MCNC: 0.78 MG/DL (ref 0.57–1)
EGFR: 83 ML/MIN/1.73
EOSINOPHIL # BLD AUTO: 0.2 X10E3/UL (ref 0–0.4)
EOSINOPHIL NFR BLD AUTO: 3 %
ERYTHROCYTE [DISTWIDTH] IN BLOOD BY AUTOMATED COUNT: 15.9 % (ref 11.7–15.4)
GLOBULIN SER-MCNC: 1.9 G/DL (ref 1.5–4.5)
GLUCOSE SERPL-MCNC: 323 MG/DL (ref 65–99)
HCT VFR BLD AUTO: 32.6 % (ref 34–46.6)
HGB BLD-MCNC: 9.9 G/DL (ref 11.1–15.9)
IMM GRANULOCYTES # BLD: 0 X10E3/UL (ref 0–0.1)
IMM GRANULOCYTES NFR BLD: 0 %
LYMPHOCYTES # BLD AUTO: 1 X10E3/UL (ref 0.7–3.1)
LYMPHOCYTES NFR BLD AUTO: 16 %
MAGNESIUM SERPL-MCNC: 1.7 MG/DL (ref 1.6–2.3)
MCH RBC QN AUTO: 23.2 PG (ref 26.6–33)
MCHC RBC AUTO-ENTMCNC: 30.4 G/DL (ref 31.5–35.7)
MCV RBC AUTO: 76 FL (ref 79–97)
MONOCYTES # BLD AUTO: 0.3 X10E3/UL (ref 0.1–0.9)
MONOCYTES NFR BLD AUTO: 4 %
MORPHOLOGY BLD-IMP: ABNORMAL
NEUTROPHILS # BLD AUTO: 4.5 X10E3/UL (ref 1.4–7)
NEUTROPHILS NFR BLD AUTO: 77 %
PLATELET # BLD AUTO: 288 X10E3/UL (ref 150–450)
POTASSIUM SERPL-SCNC: 4.7 MMOL/L (ref 3.5–5.2)
PROT SERPL-MCNC: 6 G/DL (ref 6–8.5)
RBC # BLD AUTO: 4.27 X10E6/UL (ref 3.77–5.28)
SODIUM SERPL-SCNC: 137 MMOL/L (ref 134–144)
WBC # BLD AUTO: 5.9 X10E3/UL (ref 3.4–10.8)

## 2022-03-01 RX ORDER — OXYCODONE HYDROCHLORIDE 5 MG/1
5 TABLET ORAL EVERY 6 HOURS PRN
Qty: 20 TABLET | Refills: 0 | Status: SHIPPED | OUTPATIENT
Start: 2022-03-01

## 2022-03-01 NOTE — TELEPHONE ENCOUNTER
Return call placed to patient  Patient notes she has been monitoring her blood sugar at home, and notes elevations  Encouraged her to reach out to PCP to discuss insulin dose adjustment

## 2022-03-03 ENCOUNTER — TELEPHONE (OUTPATIENT)
Dept: INTERVENTIONAL RADIOLOGY/VASCULAR | Facility: HOSPITAL | Age: 67
End: 2022-03-03

## 2022-03-07 ENCOUNTER — HOSPITAL ENCOUNTER (OUTPATIENT)
Dept: INTERVENTIONAL RADIOLOGY/VASCULAR | Facility: HOSPITAL | Age: 67
Discharge: HOME | End: 2022-03-07
Attending: OBSTETRICS & GYNECOLOGY | Admitting: RADIOLOGY
Payer: COMMERCIAL

## 2022-03-07 VITALS
HEART RATE: 90 BPM | RESPIRATION RATE: 18 BRPM | SYSTOLIC BLOOD PRESSURE: 144 MMHG | OXYGEN SATURATION: 96 % | TEMPERATURE: 97.7 F | DIASTOLIC BLOOD PRESSURE: 66 MMHG

## 2022-03-07 DIAGNOSIS — C54.1 ENDOMETRIAL CANCER (HCC): ICD-10-CM

## 2022-03-07 LAB
GLUCOSE SERPL-MCNC: 223 MG/DL (ref 65–140)
INR PPP: 0.97 (ref 0.84–1.19)
PROTHROMBIN TIME: 12.8 SECONDS (ref 11.6–14.5)

## 2022-03-07 PROCEDURE — 77001 FLUOROGUIDE FOR VEIN DEVICE: CPT | Performed by: RADIOLOGY

## 2022-03-07 PROCEDURE — 76937 US GUIDE VASCULAR ACCESS: CPT | Performed by: RADIOLOGY

## 2022-03-07 PROCEDURE — 76937 US GUIDE VASCULAR ACCESS: CPT

## 2022-03-07 PROCEDURE — 99152 MOD SED SAME PHYS/QHP 5/>YRS: CPT | Performed by: RADIOLOGY

## 2022-03-07 PROCEDURE — 36561 INSERT TUNNELED CV CATH: CPT

## 2022-03-07 PROCEDURE — 36561 INSERT TUNNELED CV CATH: CPT | Performed by: RADIOLOGY

## 2022-03-07 PROCEDURE — 99152 MOD SED SAME PHYS/QHP 5/>YRS: CPT

## 2022-03-07 PROCEDURE — 85610 PROTHROMBIN TIME: CPT | Performed by: RADIOLOGY

## 2022-03-07 PROCEDURE — 99153 MOD SED SAME PHYS/QHP EA: CPT

## 2022-03-07 PROCEDURE — C1894 INTRO/SHEATH, NON-LASER: HCPCS

## 2022-03-07 PROCEDURE — C1788 PORT, INDWELLING, IMP: HCPCS

## 2022-03-07 PROCEDURE — 82948 REAGENT STRIP/BLOOD GLUCOSE: CPT

## 2022-03-07 RX ORDER — SODIUM CHLORIDE 9 MG/ML
75 INJECTION, SOLUTION INTRAVENOUS CONTINUOUS
Status: DISCONTINUED | OUTPATIENT
Start: 2022-03-07 | End: 2022-03-08 | Stop reason: HOSPADM

## 2022-03-07 RX ORDER — IBUPROFEN 200 MG
400 TABLET ORAL EVERY 6 HOURS PRN
COMMUNITY

## 2022-03-07 RX ORDER — CEFAZOLIN SODIUM 2 G/50ML
2000 SOLUTION INTRAVENOUS ONCE
Status: COMPLETED | OUTPATIENT
Start: 2022-03-07 | End: 2022-03-07

## 2022-03-07 RX ORDER — FENTANYL CITRATE 50 UG/ML
INJECTION, SOLUTION INTRAMUSCULAR; INTRAVENOUS CODE/TRAUMA/SEDATION MEDICATION
Status: COMPLETED | OUTPATIENT
Start: 2022-03-07 | End: 2022-03-07

## 2022-03-07 RX ORDER — MIDAZOLAM HYDROCHLORIDE 2 MG/2ML
INJECTION, SOLUTION INTRAMUSCULAR; INTRAVENOUS CODE/TRAUMA/SEDATION MEDICATION
Status: COMPLETED | OUTPATIENT
Start: 2022-03-07 | End: 2022-03-07

## 2022-03-07 RX ADMIN — FENTANYL CITRATE 50 MCG: 50 INJECTION, SOLUTION INTRAMUSCULAR; INTRAVENOUS at 11:03

## 2022-03-07 RX ADMIN — MIDAZOLAM 1 MG: 1 INJECTION INTRAMUSCULAR; INTRAVENOUS at 11:03

## 2022-03-07 RX ADMIN — CEFAZOLIN SODIUM 2000 MG: 2 SOLUTION INTRAVENOUS at 10:59

## 2022-03-07 RX ADMIN — FENTANYL CITRATE 25 MCG: 50 INJECTION, SOLUTION INTRAMUSCULAR; INTRAVENOUS at 11:19

## 2022-03-07 RX ADMIN — MIDAZOLAM 0.5 MG: 1 INJECTION INTRAMUSCULAR; INTRAVENOUS at 11:19

## 2022-03-07 RX ADMIN — MIDAZOLAM 0.5 MG: 1 INJECTION INTRAMUSCULAR; INTRAVENOUS at 11:10

## 2022-03-07 RX ADMIN — FENTANYL CITRATE 25 MCG: 50 INJECTION, SOLUTION INTRAMUSCULAR; INTRAVENOUS at 11:24

## 2022-03-07 RX ADMIN — FENTANYL CITRATE 25 MCG: 50 INJECTION, SOLUTION INTRAMUSCULAR; INTRAVENOUS at 11:10

## 2022-03-07 RX ADMIN — MIDAZOLAM 0.5 MG: 1 INJECTION INTRAMUSCULAR; INTRAVENOUS at 11:24

## 2022-03-07 RX ADMIN — MIDAZOLAM 0.5 MG: 1 INJECTION INTRAMUSCULAR; INTRAVENOUS at 11:13

## 2022-03-07 RX ADMIN — FENTANYL CITRATE 25 MCG: 50 INJECTION, SOLUTION INTRAMUSCULAR; INTRAVENOUS at 11:13

## 2022-03-07 NOTE — PROGRESS NOTES
H&P reviewed  There have been no interval changes since the time the H&P was written  /66   Pulse 93   Temp 98 1 °F (36 7 °C) (Oral)   Resp 18   SpO2 96%     Presents today for port placement  Chemotherapy initiated for metastatic endometrial cancer  Procedure discussed and questions answered  Tolerated 1st session of chemotherapy well  Informed written consent was obtained      Saturnino Ely MD

## 2022-03-07 NOTE — DISCHARGE INSTRUCTIONS
Implanted Venous Access Port     WHAT YOU NEED TO KNOW:   An implanted venous access port is a device used to give treatments and take blood  It may also be called a central venous access device (CVAD)  The port is a small container that is placed under your skin, usually in your upper chest  The port is attached to a catheter that enters a large vein  DISCHARGE INSTRUCTIONS:   Resume your normal diet  Small sips of flat soda will help with mild nausea  Prevent an infection:   · Wash your hands often  Use soap and water  Clean your hands before and after you care for your port  Remind everyone who cares for your port to wash their hands  · Check your skin for infection every day  Look for redness, swelling, or fluid oozing from the port site  Care for your port:   1  You may shower beginning 48 hours after procedure  2   Leave glue in place  3  It is normal for some bruising to occur  4  Use Tylenol for pain  5  Limit use of arm on the side that your port was placed  Lift nothing heavier than 5 pounds for 1 week, and then gradually increase activity as tolerated  6  DO NOT apply ointment, lotion or cream to port site until incision is healed  Allow glue to fall off  DO NOT attempt to peel glue from skin even it it begins to flake  7  After the port incision is healed you may swim, bathe  Notify the Interventional Radiologist if you have any of the followin  Fever above 101 F    2  Increased redness or swelling after 1st day  3  Increased pain after 1st day  4  Any sign of infection (drainage from port site, skin separation, hot to touch)  5  Persistent nausea or vomiting  Contact Interventional Radiology at [phone] [facility] PATIENTS: Contact Interventional Radiology at [phone]) ([address] [facility] [address]: Contact Interventional Radiology at [phone])

## 2022-03-10 ENCOUNTER — OFFICE VISIT (OUTPATIENT)
Dept: GYNECOLOGIC ONCOLOGY | Facility: HOSPITAL | Age: 67
End: 2022-03-10
Payer: COMMERCIAL

## 2022-03-10 VITALS
DIASTOLIC BLOOD PRESSURE: 72 MMHG | RESPIRATION RATE: 20 BRPM | SYSTOLIC BLOOD PRESSURE: 150 MMHG | HEART RATE: 96 BPM | WEIGHT: 237 LBS | HEIGHT: 62 IN | BODY MASS INDEX: 43.61 KG/M2

## 2022-03-10 DIAGNOSIS — C54.1 ENDOMETRIAL CANCER (HCC): Primary | ICD-10-CM

## 2022-03-10 PROCEDURE — 99215 OFFICE O/P EST HI 40 MIN: CPT | Performed by: PHYSICIAN ASSISTANT

## 2022-03-10 RX ORDER — LIDOCAINE AND PRILOCAINE 25; 25 MG/G; MG/G
CREAM TOPICAL
Qty: 30 G | Refills: 2 | Status: SHIPPED | OUTPATIENT
Start: 2022-03-10

## 2022-03-10 NOTE — PROGRESS NOTES
Assessment/Plan:    Problem List Items Addressed This Visit        Genitourinary    Endometrial cancer ([facility] [address] ) - Primary     Stage IVB high-grade endometrial cancer with liver, mediastinal, retroperitoneal lymph node and possibly pulmonary metastatic disease, currently receiving neoadjuvant chemotherapy with taxol 175 mg/m2 and carboplatin AUC 6 every 21 days  Overall, she tolerated cycle 1 well, with the exception of taxol-related arthralgias/myalgias following treatment  This required narcotic therapy  Her malignancy-related pain is improved  Continue with cycle 2 of treatment as scheduled, as long as her metabolic and hematologic parameters are adequate  Will dose-reduce taxol to 135 mg/m2 due to taxol-related myalgias/arthralgias  Return to the office as per her chemotherapy calendar  Plan for repeat imaging after completion of cycle 3  Relevant Medications    lidocaine-prilocaine (EMLA) cream            CHIEF COMPLAINT:   Pre-chemotherapy evaluation    Problem:  Cancer Staging  Endometrial cancer [facility])  Staging form: Corpus Uteri - Carcinoma, AJCC 8th Edition  - Clinical: FIGO Stage IVB (cM1) - Signed by Sorin Acosta MD on 2/17/2022        Previous therapy:  Oncology History   [facility] )   1/26/2022 Initial Diagnosis    [facility] )     2/17/2022 -  Cancer Staged    Staging form: Corpus Uteri - Carcinoma, AJCC 8th Edition  - Clinical: FIGO Stage IVB (cM1) - Signed by Sorin Acosta MD on 2/17/2022  Histologic grade (G): G3  Histologic grading system: 3 grade system       2/24/2022 -  Chemotherapy    Taxol 175 mg/m2 and carboplatin AUC 6 every 21 days  She received 1 cycle and is scheduled for cycle 2  Taxol to be dose-reduced to 135 mg/m2 due to taxol related arthralgias/myalgias  Patient ID: Gildardo Walls is a 78 y o  female  who presents to the office for pre-chemotherapy evaluation   Overall, the patient tolerated her first treatment well  The patient has been afebrile  She noted significant bone pain and myalgias for approximately 5 days following treatment  This required use of oxycodone at time  She denies vomiting  Normal bowel/bladder function  Appetite is appropriate  She denies chemotherapy-induced neuropathy  The patient notes her Toujeo insulin dose was increased due to elevated blood sugars following chemotherapy  CBC/Diff, CMP, Mg from 3/8/22 reviewed  The following portions of the patient's history were reviewed and updated as appropriate: allergies, current medications, past medical history, past surgical history and problem list     Review of Systems   Constitutional: Negative  HENT: Negative  Eyes: Negative  Respiratory: Negative  Cardiovascular: Negative  Gastrointestinal: Negative  Genitourinary: Negative  Musculoskeletal: Positive for arthralgias (post-chemo) and myalgias (post-chemo)  Skin: Negative  Neurological: Negative  Psychiatric/Behavioral: Negative          Current Outpatient Medications   Medication Sig Dispense Refill    acetaminophen (TYLENOL) 650 mg CR tablet Take 1,300 mg by mouth 2 (two) times a day        Bacillus Coagulans-Inulin (Probiotic) 1-250 BILLION-MG CAPS as directed      fexofenadine (Allegra Allergy) 180 MG tablet every 24 hours      furosemide (LASIX) 40 mg tablet Take 1 tablet by mouth daily      Gabapentin (NEURONTIN) 300 mg/6mL solution 3 (three) times a day      ibuprofen (MOTRIN) 200 mg tablet Take 400 mg by mouth every 6 (six) hours as needed for mild pain      lisinopril (ZESTRIL) 20 mg tablet Take 1 tablet by mouth daily      lorazepam (ATIVAN) 0 5 mg/mL GEL transdermal gel Take 1 tablet by mouth daily at bedtime      metFORMIN (GLUCOPHAGE) 1000 MG tablet Take 1 tablet by mouth 2 (two) times a day with meals      omeprazole (PriLOSEC) 20 mg delayed release capsule every 24 hours      ondansetron (ZOFRAN) 8 mg tablet Take 1 tablet (8 mg total) by mouth every 8 (eight) hours as needed for nausea or vomiting 20 tablet 1    oxyCODONE (Roxicodone) 5 immediate release tablet Take 1 tablet (5 mg total) by mouth every 6 (six) hours as needed for moderate pain Max Daily Amount: 20 mg (Patient not taking: Reported on 3/7/2022 ) 20 tablet 0    Rosuvastatin Calcium 20 MG CPSP Take 1 tablet by mouth daily      sertraline (ZOLOFT) 25 mg tablet Take 1 tablet by mouth daily      sitaGLIPtin (Januvia) 100 mg tablet Take 1 tablet by mouth daily       No current facility-administered medications for this visit  Objective:    Blood pressure 150/72, pulse 96, resp  rate 20, height 5' 2 4" (1 585 m), weight 108 kg (237 lb)  Body mass index is 42 79 kg/m²  Body surface area is 2 07 meters squared  Physical Exam  Vitals reviewed  Constitutional:       General: She is not in acute distress  Appearance: Normal appearance  She is not ill-appearing  HENT:      Head: Normocephalic and atraumatic  Mouth/Throat:      Mouth: Mucous membranes are moist    Eyes:      General: No scleral icterus  Right eye: No discharge  Left eye: No discharge  Conjunctiva/sclera: Conjunctivae normal    Pulmonary:      Effort: Pulmonary effort is normal    Musculoskeletal:      Right lower leg: No edema  Left lower leg: No edema  Skin:     General: Skin is warm and dry  Coloration: Skin is not jaundiced  Findings: No rash  Neurological:      General: No focal deficit present  Mental Status: She is alert and oriented to person, place, and time  Cranial Nerves: No cranial nerve deficit  Sensory: No sensory deficit  Motor: No weakness  Gait: Gait normal    Psychiatric:         Mood and Affect: Mood normal          Behavior: Behavior normal          Thought Content: Thought content normal          Judgment: Judgment normal      Performance status is zero       Lab Results   Component Value Date    K 4 4 03/08/2022 CL 98 03/08/2022    CO2 23 03/08/2022    BUN 16 03/08/2022    CREATININE 0 72 03/08/2022    AST 20 03/08/2022    ALT 34 (H) 03/08/2022    EGFR 92 03/08/2022     Lab Results   Component Value Date    WBC 5 2 03/08/2022    HGB 9 8 (L) 03/08/2022    HCT 31 1 (L) 03/08/2022    MCV 76 (L) 03/08/2022     03/08/2022     Lab Results   Component Value Date    NEUTROABS 3 0 03/08/2022

## 2022-03-11 NOTE — ASSESSMENT & PLAN NOTE
Stage IVB high-grade endometrial cancer with liver, mediastinal, retroperitoneal lymph node and possibly pulmonary metastatic disease, currently receiving neoadjuvant chemotherapy with taxol 175 mg/m2 and carboplatin AUC 6 every 21 days  Overall, she tolerated cycle 1 well, with the exception of taxol-related arthralgias/myalgias following treatment  This required narcotic therapy  Her malignancy-related pain is improved  Continue with cycle 2 of treatment as scheduled, as long as her metabolic and hematologic parameters are adequate  Will dose-reduce taxol to 135 mg/m2 due to taxol-related myalgias/arthralgias  Return to the office as per her chemotherapy calendar  Plan for repeat imaging after completion of cycle 3

## 2022-03-17 ENCOUNTER — HOSPITAL ENCOUNTER (OUTPATIENT)
Dept: INFUSION CENTER | Facility: HOSPITAL | Age: 67
Discharge: HOME | End: 2022-03-17
Attending: OBSTETRICS & GYNECOLOGY
Payer: COMMERCIAL

## 2022-03-17 VITALS
RESPIRATION RATE: 20 BRPM | BODY MASS INDEX: 43.77 KG/M2 | TEMPERATURE: 97.5 F | DIASTOLIC BLOOD PRESSURE: 64 MMHG | WEIGHT: 237.88 LBS | HEIGHT: 62 IN | SYSTOLIC BLOOD PRESSURE: 147 MMHG | HEART RATE: 99 BPM

## 2022-03-17 DIAGNOSIS — C54.1 ENDOMETRIAL CANCER (HCC): Primary | ICD-10-CM

## 2022-03-17 PROCEDURE — 96375 TX/PRO/DX INJ NEW DRUG ADDON: CPT

## 2022-03-17 PROCEDURE — 96417 CHEMO IV INFUS EACH ADDL SEQ: CPT

## 2022-03-17 PROCEDURE — 96413 CHEMO IV INFUSION 1 HR: CPT

## 2022-03-17 PROCEDURE — 96415 CHEMO IV INFUSION ADDL HR: CPT

## 2022-03-17 PROCEDURE — 96367 TX/PROPH/DG ADDL SEQ IV INF: CPT

## 2022-03-17 RX ORDER — SODIUM CHLORIDE 9 MG/ML
20 INJECTION, SOLUTION INTRAVENOUS ONCE
Status: CANCELLED | OUTPATIENT
Start: 2022-03-17

## 2022-03-17 RX ORDER — PALONOSETRON 0.05 MG/ML
0.25 INJECTION, SOLUTION INTRAVENOUS ONCE
Status: CANCELLED | OUTPATIENT
Start: 2022-03-17

## 2022-03-17 RX ORDER — SODIUM CHLORIDE 9 MG/ML
20 INJECTION, SOLUTION INTRAVENOUS ONCE
Status: COMPLETED | OUTPATIENT
Start: 2022-03-17 | End: 2022-03-17

## 2022-03-17 RX ORDER — PALONOSETRON 0.05 MG/ML
0.25 INJECTION, SOLUTION INTRAVENOUS ONCE
Status: COMPLETED | OUTPATIENT
Start: 2022-03-17 | End: 2022-03-17

## 2022-03-17 RX ADMIN — FOSAPREPITANT DIMEGLUMINE 150 MG: 150 INJECTION, POWDER, LYOPHILIZED, FOR SOLUTION INTRAVENOUS at 10:00

## 2022-03-17 RX ADMIN — DIPHENHYDRAMINE HYDROCHLORIDE 25 MG: 50 INJECTION, SOLUTION INTRAMUSCULAR; INTRAVENOUS at 09:15

## 2022-03-17 RX ADMIN — PALONOSETRON 0.25 MG: 0.05 INJECTION, SOLUTION INTRAVENOUS at 08:53

## 2022-03-17 RX ADMIN — FAMOTIDINE 20 MG: 10 INJECTION INTRAVENOUS at 09:39

## 2022-03-17 RX ADMIN — PACLITAXEL 279.6 MG: 6 INJECTION, SOLUTION INTRAVENOUS at 10:42

## 2022-03-17 RX ADMIN — CARBOPLATIN 600 MG: 10 INJECTION, SOLUTION INTRAVENOUS at 13:42

## 2022-03-17 RX ADMIN — DEXAMETHASONE SODIUM PHOSPHATE 20 MG: 10 INJECTION, SOLUTION INTRAMUSCULAR; INTRAVENOUS at 08:53

## 2022-03-17 RX ADMIN — SODIUM CHLORIDE 20 ML/HR: 0.9 INJECTION, SOLUTION INTRAVENOUS at 08:52

## 2022-03-31 ENCOUNTER — TELEMEDICINE (OUTPATIENT)
Dept: GYNECOLOGIC ONCOLOGY | Facility: CLINIC | Age: 67
End: 2022-03-31
Payer: COMMERCIAL

## 2022-03-31 DIAGNOSIS — C54.1 ENDOMETRIAL CANCER (HCC): Primary | ICD-10-CM

## 2022-03-31 PROCEDURE — 99213 OFFICE O/P EST LOW 20 MIN: CPT | Performed by: PHYSICIAN ASSISTANT

## 2022-03-31 NOTE — PROGRESS NOTES
Virtual Regular Visit    Verification of patient location:    Patient is located in the following state in which I hold an active license [place]      Assessment/Plan:    Problem List Items Addressed This Visit        Genitourinary    Endometrial cancer ([facility] [address] ) - Primary     Stage IVB high-grade endometrial cancer with liver, mediastinal, retroperitoneal lymph node and possibly pulmonary metastatic disease, currently receiving neoadjuvant chemotherapy with taxol 135 mg/m2 and carboplatin AUC 6 every 21 days  Taxol previously dose-reduce due to myalgias/arthralgias, which has significantly improved  Her malignancy-related pain has resolved      Continue with cycle 3 of treatment as scheduled, as long as her metabolic and hematologic parameters are adequate       Plan for repeat imaging after completion of cycle 3  Return to the office as per her chemotherapy calendar  Relevant Orders    CT chest abdomen pelvis w contrast               Reason for visit is   Chief Complaint   Patient presents with    Virtual Regular Visit        Encounter provider Jaz Jang PA-C    Provider located at [address] [address] [phone]      Recent Visits  No visits were found meeting these conditions  Showing recent visits within past 7 days and meeting all other requirements  Today's Visits  Date Type Provider Dept   03/31/22 Telemedicine Jaz Jang PA-C [address] [place] [address] today's visits and meeting all other requirements  Future Appointments  No visits were found meeting these conditions  Showing future appointments within next 150 days and meeting all other requirements       The patient was identified by name and date of birth  Rocio Mi was informed that this is a telemedicine visit and that the visit is being conducted through Telephone  My office door was closed   No one else was in the room   She acknowledged consent and understanding of privacy and security of the video platform  The patient has agreed to participate and understands they can discontinue the visit at any time  Patient is aware this is a billable service  Subjective  Emily Ling is a 78 y o  female   who presents virtually for pre-chemotherapy evaluation  Overall, the patient is tolerating treatment well without acute complaints  She has been afebrile  Appetite is appropriate  She denies n/v  Her abdominal/pelvic/back pain has resolved  She notes her bone pain is significantly improved with taxol dose-reduction  She notes normal bowel/bladder function  Doing well  Back/pelvic pain completely resolved  CBC/Diff, CMP, Mg from 3/28/22 reviewed  Oncology History   Endometrial cancer ([place] [address] )   1/26/2022 Initial Diagnosis    Endometrial cancer ([place] [address] )     2/17/2022 -  Cancer Staged    Staging form: Corpus Uteri - Carcinoma, AJCC 8th Edition  - Clinical: FIGO Stage IVB (cM1) - Signed by Kayden Roque MD on 2/17/2022  Histologic grade (G): G3  Histologic grading system: 3 grade system       2/24/2022 -  Chemotherapy    Taxol 175 mg/m2 and carboplatin AUC 6 every 21 days  Taxol dose-reduced to 135 mg/m2 with cycle 2 due to arthralgias/myalgias  She is scheduled for cycle 3              Past Medical History:   Diagnosis Date    Endometrial cancer ([place] [address] )     Hyperlipidemia associated with type 2 diabetes mellitus ([place] [address] )     Morbid obesity with BMI of 40 0-44 9, adult (HCC)     Type 2 diabetes mellitus without complication ([place] [address] )        Past Surgical History:   Procedure Laterality Date    CATARACT EXTRACTION W/ INTRAOCULAR LENS IMPLANT Bilateral     CHOLECYSTECTOMY OPEN      IR PORT PLACEMENT  3/7/2022    SALPINGOOPHORECTOMY N/A        Current Outpatient Medications   Medication Sig Dispense Refill    acetaminophen (TYLENOL) 650 mg CR tablet Take 1,300 mg by mouth 2 (two) times a day        Bacillus Coagulans-Inulin (Probiotic) 1-250 BILLION-MG CAPS as directed      fexofenadine (Allegra Allergy) 180 MG tablet every 24 hours      furosemide (LASIX) 40 mg tablet Take 1 tablet by mouth daily      Gabapentin (NEURONTIN) 300 mg/6mL solution 3 (three) times a day      ibuprofen (MOTRIN) 200 mg tablet Take 400 mg by mouth every 6 (six) hours as needed for mild pain      lidocaine-prilocaine (EMLA) cream Apply to port site 30 min prior to labs/chemo 30 g 2    lisinopril (ZESTRIL) 20 mg tablet Take 1 tablet by mouth daily      lorazepam (ATIVAN) 0 5 mg/mL GEL transdermal gel Take 1 tablet by mouth daily at bedtime      metFORMIN (GLUCOPHAGE) 1000 MG tablet Take 1 tablet by mouth 2 (two) times a day with meals      omeprazole (PriLOSEC) 20 mg delayed release capsule every 24 hours      ondansetron (ZOFRAN) 8 mg tablet Take 1 tablet (8 mg total) by mouth every 8 (eight) hours as needed for nausea or vomiting 20 tablet 1    oxyCODONE (Roxicodone) 5 immediate release tablet Take 1 tablet (5 mg total) by mouth every 6 (six) hours as needed for moderate pain Max Daily Amount: 20 mg (Patient not taking: Reported on 3/7/2022 ) 20 tablet 0    Rosuvastatin Calcium 20 MG CPSP Take 1 tablet by mouth daily      sertraline (ZOLOFT) 25 mg tablet Take 1 tablet by mouth daily      sitaGLIPtin (Januvia) 100 mg tablet Take 1 tablet by mouth daily       No current facility-administered medications for this visit  Allergies   Allergen Reactions    Empagliflozin Other (See Comments)    Exenatide Other (See Comments)    Glipizide Other (See Comments)    Repaglinide Other (See Comments)       Review of Systems   Constitutional: Negative  HENT: Negative  Eyes: Negative  Respiratory: Negative  Cardiovascular: Negative  Gastrointestinal: Negative  Genitourinary: Negative  Musculoskeletal: Negative  Skin: Negative  Neurological: Negative  Psychiatric/Behavioral: Negative          Video Exam    There were no vitals filed for this visit  Visit performed via telephone/audio only  I spent 20 minutes with patient today in which greater than 50% of the time was spent in counseling/coordination of care regarding chemotherapy    VIRTUAL VISIT [address] verbally agrees to participate in [employer]  Pt is aware that [employer] could be limited without vital signs or the ability to perform a full hands-on physical Flonnie Eisenmenger understands she or the provider may request at any time to terminate the video visit and request the patient to seek care or treatment in person

## 2022-04-01 NOTE — ASSESSMENT & PLAN NOTE
Stage IVB high-grade endometrial cancer with liver, mediastinal, retroperitoneal lymph node and possibly pulmonary metastatic disease, currently receiving neoadjuvant chemotherapy with taxol 135 mg/m2 and carboplatin AUC 6 every 21 days  Taxol previously dose-reduce due to myalgias/arthralgias, which has significantly improved  Her malignancy-related pain has resolved      Continue with cycle 3 of treatment as scheduled, as long as her metabolic and hematologic parameters are adequate       Plan for repeat imaging after completion of cycle 3  Return to the office as per her chemotherapy calendar

## 2022-04-06 RX ORDER — PALONOSETRON 0.05 MG/ML
0.25 INJECTION, SOLUTION INTRAVENOUS ONCE
Status: CANCELLED | OUTPATIENT
Start: 2022-04-07

## 2022-04-06 RX ORDER — SODIUM CHLORIDE 9 MG/ML
20 INJECTION, SOLUTION INTRAVENOUS ONCE
Status: CANCELLED | OUTPATIENT
Start: 2022-04-07

## 2022-04-07 ENCOUNTER — HOSPITAL ENCOUNTER (OUTPATIENT)
Dept: INFUSION CENTER | Facility: HOSPITAL | Age: 67
Discharge: HOME | End: 2022-04-07
Attending: OBSTETRICS & GYNECOLOGY
Payer: COMMERCIAL

## 2022-04-07 VITALS
SYSTOLIC BLOOD PRESSURE: 142 MMHG | OXYGEN SATURATION: 100 % | RESPIRATION RATE: 20 BRPM | TEMPERATURE: 97.7 F | WEIGHT: 238.98 LBS | HEART RATE: 94 BPM | DIASTOLIC BLOOD PRESSURE: 63 MMHG | HEIGHT: 63 IN | BODY MASS INDEX: 42.34 KG/M2

## 2022-04-07 DIAGNOSIS — C54.1 ENDOMETRIAL CANCER (HCC): Primary | ICD-10-CM

## 2022-04-07 PROCEDURE — 96417 CHEMO IV INFUS EACH ADDL SEQ: CPT

## 2022-04-07 PROCEDURE — 96367 TX/PROPH/DG ADDL SEQ IV INF: CPT

## 2022-04-07 PROCEDURE — 96413 CHEMO IV INFUSION 1 HR: CPT

## 2022-04-07 PROCEDURE — 96375 TX/PRO/DX INJ NEW DRUG ADDON: CPT

## 2022-04-07 PROCEDURE — 96415 CHEMO IV INFUSION ADDL HR: CPT

## 2022-04-07 RX ORDER — PALONOSETRON 0.05 MG/ML
0.25 INJECTION, SOLUTION INTRAVENOUS ONCE
Status: COMPLETED | OUTPATIENT
Start: 2022-04-07 | End: 2022-04-07

## 2022-04-07 RX ORDER — SODIUM CHLORIDE 9 MG/ML
20 INJECTION, SOLUTION INTRAVENOUS ONCE
Status: COMPLETED | OUTPATIENT
Start: 2022-04-07 | End: 2022-04-07

## 2022-04-07 RX ADMIN — DEXAMETHASONE SODIUM PHOSPHATE 20 MG: 10 INJECTION, SOLUTION INTRAMUSCULAR; INTRAVENOUS at 08:52

## 2022-04-07 RX ADMIN — FOSAPREPITANT DIMEGLUMINE 150 MG: 150 INJECTION, POWDER, LYOPHILIZED, FOR SOLUTION INTRAVENOUS at 10:01

## 2022-04-07 RX ADMIN — PALONOSETRON 0.25 MG: 0.05 INJECTION, SOLUTION INTRAVENOUS at 08:55

## 2022-04-07 RX ADMIN — PACLITAXEL 279.6 MG: 6 INJECTION, SOLUTION INTRAVENOUS at 10:37

## 2022-04-07 RX ADMIN — DIPHENHYDRAMINE HYDROCHLORIDE 25 MG: 50 INJECTION, SOLUTION INTRAMUSCULAR; INTRAVENOUS at 09:15

## 2022-04-07 RX ADMIN — CARBOPLATIN 583.8 MG: 10 INJECTION, SOLUTION INTRAVENOUS at 13:34

## 2022-04-07 RX ADMIN — FAMOTIDINE 20 MG: 10 INJECTION INTRAVENOUS at 09:38

## 2022-04-07 RX ADMIN — SODIUM CHLORIDE 20 ML/HR: 0.9 INJECTION, SOLUTION INTRAVENOUS at 08:52

## 2022-04-19 ENCOUNTER — HOSPITAL ENCOUNTER (OUTPATIENT)
Dept: CT IMAGING | Facility: HOSPITAL | Age: 67
Discharge: HOME | End: 2022-04-19
Payer: COMMERCIAL

## 2022-04-19 DIAGNOSIS — C54.1 ENDOMETRIAL CANCER (HCC): ICD-10-CM

## 2022-04-19 PROCEDURE — 71260 CT THORAX DX C+: CPT

## 2022-04-19 PROCEDURE — 74177 CT ABD & PELVIS W/CONTRAST: CPT

## 2022-04-19 PROCEDURE — G1004 CDSM NDSC: HCPCS

## 2022-04-19 RX ADMIN — IOHEXOL 50 ML: 240 INJECTION, SOLUTION INTRATHECAL; INTRAVASCULAR; INTRAVENOUS; ORAL at 09:00

## 2022-04-19 RX ADMIN — IOHEXOL 100 ML: 350 INJECTION, SOLUTION INTRAVENOUS at 09:00

## 2022-04-21 ENCOUNTER — OFFICE VISIT (OUTPATIENT)
Dept: GYNECOLOGIC ONCOLOGY | Facility: HOSPITAL | Age: 67
End: 2022-04-21
Payer: COMMERCIAL

## 2022-04-21 VITALS
BODY MASS INDEX: 41.82 KG/M2 | RESPIRATION RATE: 16 BRPM | DIASTOLIC BLOOD PRESSURE: 80 MMHG | OXYGEN SATURATION: 96 % | TEMPERATURE: 97.3 F | HEART RATE: 108 BPM | HEIGHT: 63 IN | WEIGHT: 236 LBS | SYSTOLIC BLOOD PRESSURE: 132 MMHG

## 2022-04-21 DIAGNOSIS — C54.1 ENDOMETRIAL CANCER (HCC): Primary | ICD-10-CM

## 2022-04-21 PROCEDURE — 99215 OFFICE O/P EST HI 40 MIN: CPT | Performed by: OBSTETRICS & GYNECOLOGY

## 2022-04-21 RX ORDER — LORAZEPAM 0.5 MG/1
0.5 TABLET ORAL 2 TIMES DAILY
COMMUNITY
Start: 2022-03-25

## 2022-04-21 RX ORDER — PEN NEEDLE, DIABETIC 32GX 5/32"
NEEDLE, DISPOSABLE MISCELLANEOUS 2 TIMES DAILY
COMMUNITY
Start: 2022-02-22

## 2022-04-21 RX ORDER — GABAPENTIN 300 MG/1
300 CAPSULE ORAL 3 TIMES DAILY
COMMUNITY
Start: 2022-04-01

## 2022-04-21 RX ORDER — BLOOD SUGAR DIAGNOSTIC
STRIP MISCELLANEOUS 2 TIMES DAILY
COMMUNITY
Start: 2022-04-18

## 2022-04-21 RX ORDER — HEPARIN SODIUM 5000 [USP'U]/ML
5000 INJECTION, SOLUTION INTRAVENOUS; SUBCUTANEOUS
Status: CANCELLED | OUTPATIENT
Start: 2022-04-21 | End: 2022-04-22

## 2022-04-21 RX ORDER — ACETAMINOPHEN 325 MG/1
975 TABLET ORAL ONCE
Status: CANCELLED | OUTPATIENT
Start: 2022-04-21 | End: 2022-04-21

## 2022-04-21 RX ORDER — CEFAZOLIN SODIUM 2 G/50ML
2000 SOLUTION INTRAVENOUS ONCE
Status: CANCELLED | OUTPATIENT
Start: 2022-04-21 | End: 2022-04-21

## 2022-04-21 RX ORDER — LANCETS
EACH MISCELLANEOUS 2 TIMES DAILY
COMMUNITY
Start: 2022-04-18

## 2022-04-21 RX ORDER — SODIUM CHLORIDE, SODIUM LACTATE, POTASSIUM CHLORIDE, CALCIUM CHLORIDE 600; 310; 30; 20 MG/100ML; MG/100ML; MG/100ML; MG/100ML
125 INJECTION, SOLUTION INTRAVENOUS CONTINUOUS
Status: CANCELLED | OUTPATIENT
Start: 2022-04-21

## 2022-04-21 RX ORDER — INSULIN GLARGINE 300 U/ML
INJECTION, SOLUTION SUBCUTANEOUS EVERY 12 HOURS SCHEDULED
COMMUNITY
Start: 2022-04-04

## 2022-04-21 NOTE — LETTER
April 21, 2022     Philly Bryan DO  [address]  [address] [address]    Patient: Albert Burgess   YOB: 1955   Date of Visit: 4/21/2022       Dear Dr Afua Cowan:    Thank you for referring Akosua Farmer to me for evaluation  Below are my notes for this consultation  If you have questions, please do not hesitate to call me  I look forward to following your patient along with you  Sincerely,        Chiara Ramos MD        CC: DO Chiara Hicks MD  4/21/2022 11:46 AM  Sign when Signing Visit  Assessment/Plan:    Problem List Items Addressed This Visit        Genitourinary    Endometrial cancer ([facility] [address] ) - Primary     79-year-old with stage IV B high-grade endometrial cancer, liver, retroperitoneal lymph node, lung metastatic disease who has received 3 cycles of carboplatin and Taxol chemotherapy  Treatment course has been complicated by Taxol related arthralgias/neuropathy  I reviewed her CT of the chest abdomen pelvis, CBC, CMP  Her performance status is 1     1  I reviewed the CT findings in detail  She has stable disease  2  We discussed the risks and benefits of performing robotic assisted total laparoscopic hysterectomy and bilateral salpingo-oophorectomy and length  She understands that this would be largely a palliative procedure although there is some data suggesting benefit to removing the primary lesion  Although she had previously signed consent for a robotic assisted procedure, I reviewed the risks and benefits of the surgery  She understands the risks and benefits of the operation agrees to proceed as outlined  3  Will plan for cycle 4  Of carboplatin and Taxol and schedule surgery approximately 3 weeks post cycle 4 to allow her counts to recover adequately  4  Postoperatively, will add bevacizumab to carboplatin and Taxol                       CHIEF COMPLAINT:  Pre chemotherapy evaluation and treatment discussion          Problem:  Cancer Staging  Endometrial cancer [facility])  Staging form: Corpus Uteri - Carcinoma, AJCC 8th Edition  - Clinical: FIGO Stage IVB (cM1) - Signed by Maeve Gaitan MD on 2/17/2022      Previous therapy:  Oncology History   Endometrial cancer ([facility] [address] )   1/26/2022 Initial Diagnosis    Endometrial cancer ([facility] [address] )     2/17/2022 -  Cancer Staged    Staging form: Corpus Uteri - Carcinoma, AJCC 8th Edition  - Clinical: FIGO Stage IVB (cM1) - Signed by Maeve Gaitan MD on 2/17/2022  Histologic grade (G): G3  Histologic grading system: 3 grade system       2/24/2022 -  Chemotherapy    Taxol 175 mg/m2 and carboplatin AUC 6 every 21 days  Taxol dose-reduced to 135 mg/m2 with cycle 2 due to arthralgias/myalgias  She has received 3 cycles     3/11/2022 Genomic Testing    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, ER/NV positive, microsatellite stable  No other targeted therapy opportunities  Patient ID: Bib Pena is a 78 y o  female  Returns for pre chemotherapy evaluation and treatment discussion  She has received 3 cycles of carboplatin and Taxol  The Taxol required dose reduction due to neuropathy/arthralgias in her lower extremities  She is able to perform her activities of daily living  Overall, the chemotherapy has not significantly affected her activities of daily living  She does note some restless leg symptoms at night  Labs from 4/18/2022 reveal a total white blood cell count 2 8, hemoglobin 9 3 grams/deciliter and a platelet count of [id number]  Blood glucose was 250 mg/dL  Serum creatinine 0 64 mg/dL  The remainder of her CMP was normal   She had a CT scan of the chest abdomen pelvis after 3 cycles which revealed stable disease  I also reviewed genomic testing data  Mismatch repair was intact  No other interval change in her medications or medical history since her last visit        Review of Systems    Current Outpatient Medications   Medication Sig Dispense Refill    acetaminophen (TYLENOL) 650 mg CR tablet Take 1,300 mg by mouth 2 (two) times a day        Bacillus Coagulans-Inulin (Probiotic) 1-250 BILLION-MG CAPS as directed      BD Pen Needle Yoana 2nd Gen 32G X 4 MM MISC 2 (two) times a day      Contour Next Test test strip 2 (two) times a day Test      fexofenadine (Allegra Allergy) 180 MG tablet every 24 hours      furosemide (LASIX) 40 mg tablet Take 1 tablet by mouth daily      gabapentin (NEURONTIN) 300 mg capsule Take 300 mg by mouth 3 (three) times a day      Gabapentin (NEURONTIN) 300 mg/6mL solution 3 (three) times a day      ibuprofen (MOTRIN) 200 mg tablet Take 400 mg by mouth every 6 (six) hours as needed for mild pain      lidocaine-prilocaine (EMLA) cream Apply to port site 30 min prior to labs/chemo 30 g 2    lisinopril (ZESTRIL) 20 mg tablet Take 1 tablet by mouth daily      LORazepam (ATIVAN) 0 5 mg tablet Take 0 5 mg by mouth daily at bedtime      lorazepam (ATIVAN) 0 5 mg/mL GEL transdermal gel Take 1 tablet by mouth daily at bedtime      metFORMIN (GLUCOPHAGE) 1000 MG tablet Take 1 tablet by mouth 2 (two) times a day with meals      Microlet Lancets MISC 2 (two) times a day Test      omeprazole (PriLOSEC) 20 mg delayed release capsule every 24 hours      ondansetron (ZOFRAN) 8 mg tablet Take 1 tablet (8 mg total) by mouth every 8 (eight) hours as needed for nausea or vomiting 20 tablet 1    Rosuvastatin Calcium 20 MG CPSP Take 1 tablet by mouth daily      sertraline (ZOLOFT) 25 mg tablet Take 1 tablet by mouth daily      sitaGLIPtin (Januvia) 100 mg tablet Take 1 tablet by mouth daily      Toujeo SoloStar 300 units/mL CONCENTRATED U-300 injection pen (1-unit dial) INJECT 32 UNITS IN THE MORNING AND 34 UNITS AT BEDTIME SUBCUTANEOUSLY      oxyCODONE (Roxicodone) 5 immediate release tablet Take 1 tablet (5 mg total) by mouth every 6 (six) hours as needed for moderate pain Max Daily Amount: 20 mg (Patient not taking: Reported on 3/7/2022 ) 20 tablet 0     No current facility-administered medications for this visit  Allergies   Allergen Reactions    Empagliflozin Other (See Comments)    Exenatide Other (See Comments)    Glipizide Other (See Comments)    Repaglinide Other (See Comments)       Past Medical History:   Diagnosis Date    Endometrial cancer ([facility] )     Hyperlipidemia associated with type 2 diabetes mellitus ([place] [address] )     Morbid obesity with BMI of 40 0-44 9, adult ([place] [address] )     Type 2 diabetes mellitus without complication (HCC)        Past Surgical History:   Procedure Laterality Date    CATARACT EXTRACTION W/ INTRAOCULAR LENS IMPLANT Bilateral     CHOLECYSTECTOMY OPEN      IR PORT PLACEMENT  3/7/2022    SALPINGOOPHORECTOMY N/A        OB History        1    Para   1    Term                AB        Living           SAB        IAB        Ectopic        Multiple        Live Births                     No family history on file  The following portions of the patient's history were reviewed and updated as appropriate: allergies, current medications, past family history, past medical history, past social history, past surgical history and problem list       Objective:    Blood pressure 132/80, pulse (!) 108, temperature (!) 97 3 °F (36 3 °C), temperature source Temporal, resp  rate 16, height 5' 3" (1 6 m), weight 107 kg (236 lb), SpO2 96 %  Body mass index is 41 81 kg/m²  Physical Exam  Constitutional:       General: She is not in acute distress  Appearance: Normal appearance  She is well-developed  She is obese  She is not ill-appearing, toxic-appearing or diaphoretic  HENT:      Head: Normocephalic and atraumatic  Eyes:      General: No scleral icterus  Right eye: No discharge  Left eye: No discharge  Extraocular Movements: Extraocular movements intact  Conjunctiva/sclera: Conjunctivae normal    Neck:      Thyroid: No thyromegaly     Cardiovascular:      Rate and Rhythm: Regular rhythm  Tachycardia present  Heart sounds: Normal heart sounds  No murmur heard  No friction rub  No gallop  Pulmonary:      Effort: No respiratory distress  Breath sounds: Normal breath sounds  No stridor  No wheezing or rhonchi  Abdominal:      General: There is no distension  Palpations: Abdomen is soft  There is no mass  Tenderness: There is no abdominal tenderness  There is no guarding or rebound  Hernia: No hernia is present  Genitourinary:     Comments: The external female genitalia is normal  The bartholin's, uretheral and skenes glands are normal  The urethral meatus is normal (midline with no lesions)  Anus without fissure or lesion  Speculum exam reveals vagina without lesion or discharge  Cervix is normal appearing without visible lesions  No bleeding  No significant cystocele or rectocele noted  Bimanual exam notes a 10 week uterus with normal contour, mobility  No tenderness  Adnexa without masses or tenderness  Bladder is without fullness, mass or tenderness  Musculoskeletal:         General: No swelling, tenderness, deformity or signs of injury  Cervical back: Normal range of motion and neck supple  No rigidity  Right lower leg: Edema present  Left lower leg: Edema present  Lymphadenopathy:      Cervical: No cervical adenopathy  Skin:     General: Skin is warm and dry  Coloration: Skin is not jaundiced or pale  Findings: No bruising, erythema or rash  Neurological:      General: No focal deficit present  Mental Status: She is alert and oriented to person, place, and time  Mental status is at baseline  Cranial Nerves: No cranial nerve deficit  Motor: No weakness  Gait: Gait normal    Psychiatric:         Mood and Affect: Mood normal          Behavior: Behavior normal          Thought Content:  Thought content normal          Judgment: Judgment normal            Lab Results   Component Value Date     10 9 04/04/2022     Lab Results   Component Value Date    WBC 2 8 (L) 04/18/2022    HGB 9 3 (L) 04/18/2022    HCT 29 4 (L) 04/18/2022    MCV 81 04/18/2022     04/18/2022     Lab Results   Component Value Date    K 3 7 04/18/2022     04/18/2022    CO2 24 04/18/2022    BUN 14 04/18/2022    CREATININE 0 64 04/18/2022    AST 18 04/18/2022    ALT 26 04/18/2022    EGFR 97 04/18/2022        Trend:  Lab Results   Component Value Date     10 9 04/04/2022     11 4 03/15/2022     10 9 02/28/2022

## 2022-04-21 NOTE — ASSESSMENT & PLAN NOTE
41-year-old with stage IV B high-grade endometrial cancer, liver, retroperitoneal lymph node, lung metastatic disease who has received 3 cycles of carboplatin and Taxol chemotherapy  Treatment course has been complicated by Taxol related arthralgias/neuropathy  I reviewed her CT of the chest abdomen pelvis, CBC, CMP  Her performance status is 1     1  I reviewed the CT findings in detail  She has stable disease  2  We discussed the risks and benefits of performing robotic assisted total laparoscopic hysterectomy and bilateral salpingo-oophorectomy and length  She understands that this would be largely a palliative procedure although there is some data suggesting benefit to removing the primary lesion  Although she had previously signed consent for a robotic assisted procedure, I reviewed the risks and benefits of the surgery  She understands the risks and benefits of the operation agrees to proceed as outlined  3  Will plan for cycle 4  Of carboplatin and Taxol and schedule surgery approximately 3 weeks post cycle 4 to allow her counts to recover adequately  4  Postoperatively, will add bevacizumab to carboplatin and Taxol

## 2022-04-21 NOTE — PATIENT INSTRUCTIONS
1  Nothing to eat or drink after midnight prior to the operation  You are allowed clear liquids until 3 hours prior to the scheduled start time of surgery  No milk products or solid food for 8 hours prior to surgery  2   Please avoid ibuprofen, aspirin, fish oil for 7 days prior to surgery  3  You may take your gabapentin and omeprazole the morning of surgery with a sip of water  Do not take any of your other medicines the morning of surgery  4  Take half your dose of insulin the night before surgery

## 2022-04-21 NOTE — PROGRESS NOTES
Assessment/Plan:    Problem List Items Addressed This Visit        Genitourinary    Endometrial cancer ([facility] ) - Primary     79-year-old with stage IV B high-grade endometrial cancer, liver, retroperitoneal lymph node, lung metastatic disease who has received 3 cycles of carboplatin and Taxol chemotherapy  Treatment course has been complicated by Taxol related arthralgias/neuropathy  I reviewed her CT of the chest abdomen pelvis, CBC, CMP  Her performance status is 1     1  I reviewed the CT findings in detail  She has stable disease  2  We discussed the risks and benefits of performing robotic assisted total laparoscopic hysterectomy and bilateral salpingo-oophorectomy and length  She understands that this would be largely a palliative procedure although there is some data suggesting benefit to removing the primary lesion  Although she had previously signed consent for a robotic assisted procedure, I reviewed the risks and benefits of the surgery  She understands the risks and benefits of the operation agrees to proceed as outlined  3  Will plan for cycle 4  Of carboplatin and Taxol and schedule surgery approximately 3 weeks post cycle 4 to allow her counts to recover adequately  4  Postoperatively, will add bevacizumab to carboplatin and Taxol                       CHIEF COMPLAINT:  Pre chemotherapy evaluation and treatment discussion          Problem:  Cancer Staging  Endometrial cancer [facility])  Staging form: Corpus Uteri - Carcinoma, AJCC 8th Edition  - Clinical: FIGO Stage IVB (cM1) - Signed by Keith Saravia MD on 2/17/2022      Previous therapy:  Oncology History   Endometrial cancer ([facility] )   1/26/2022 Initial Diagnosis    [facility] ([facility] )     2/17/2022 -  Cancer Staged    Staging form: Corpus Uteri - Carcinoma, AJCC 8th Edition  - Clinical: FIGO Stage IVB (cM1) - Signed by Keith Saravia MD on 2/17/2022  Histologic grade (G): G3  Histologic grading system: 3 grade system 2/24/2022 -  Chemotherapy    Taxol 175 mg/m2 and carboplatin AUC 6 every 21 days  Taxol dose-reduced to 135 mg/m2 with cycle 2 due to arthralgias/myalgias  She has received 3 cycles     3/11/2022 Genomic Testing    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, ER/LA positive, microsatellite stable  No other targeted therapy opportunities  Patient ID: Olinda Rojas is a 78 y o  female  Returns for pre chemotherapy evaluation and treatment discussion  She has received 3 cycles of carboplatin and Taxol  The Taxol required dose reduction due to neuropathy/arthralgias in her lower extremities  She is able to perform her activities of daily living  Overall, the chemotherapy has not significantly affected her activities of daily living  She does note some restless leg symptoms at night  Labs from 4/18/2022 reveal a total white blood cell count 2 8, hemoglobin 9 3 grams/deciliter and a platelet count of [id number]  Blood glucose was 250 mg/dL  Serum creatinine 0 64 mg/dL  The remainder of her CMP was normal   She had a CT scan of the chest abdomen pelvis after 3 cycles which revealed stable disease  I also reviewed genomic testing data  Mismatch repair was intact  No other interval change in her medications or medical history since her last visit        Review of Systems    Current Outpatient Medications   Medication Sig Dispense Refill    acetaminophen (TYLENOL) 650 mg CR tablet Take 1,300 mg by mouth 2 (two) times a day        Bacillus Coagulans-Inulin (Probiotic) 1-250 BILLION-MG CAPS as directed      BD Pen Needle Yoana 2nd Gen 32G X 4 MM MISC 2 (two) times a day      Contour Next Test test strip 2 (two) times a day Test      fexofenadine (Allegra Allergy) 180 MG tablet every 24 hours      furosemide (LASIX) 40 mg tablet Take 1 tablet by mouth daily      gabapentin (NEURONTIN) 300 mg capsule Take 300 mg by mouth 3 (three) times a day      Gabapentin (NEURONTIN) 300 mg/6mL solution 3 (three) times a day      ibuprofen (MOTRIN) 200 mg tablet Take 400 mg by mouth every 6 (six) hours as needed for mild pain      lidocaine-prilocaine (EMLA) cream Apply to port site 30 min prior to labs/chemo 30 g 2    lisinopril (ZESTRIL) 20 mg tablet Take 1 tablet by mouth daily      LORazepam (ATIVAN) 0 5 mg tablet Take 0 5 mg by mouth daily at bedtime      lorazepam (ATIVAN) 0 5 mg/mL GEL transdermal gel Take 1 tablet by mouth daily at bedtime      metFORMIN (GLUCOPHAGE) 1000 MG tablet Take 1 tablet by mouth 2 (two) times a day with meals      Microlet Lancets MISC 2 (two) times a day Test      omeprazole (PriLOSEC) 20 mg delayed release capsule every 24 hours      ondansetron (ZOFRAN) 8 mg tablet Take 1 tablet (8 mg total) by mouth every 8 (eight) hours as needed for nausea or vomiting 20 tablet 1    Rosuvastatin Calcium 20 MG CPSP Take 1 tablet by mouth daily      sertraline (ZOLOFT) 25 mg tablet Take 1 tablet by mouth daily      sitaGLIPtin (Januvia) 100 mg tablet Take 1 tablet by mouth daily      Toujeo SoloStar 300 units/mL CONCENTRATED U-300 injection pen (1-unit dial) INJECT 32 UNITS IN THE MORNING AND 34 UNITS AT BEDTIME SUBCUTANEOUSLY      oxyCODONE (Roxicodone) 5 immediate release tablet Take 1 tablet (5 mg total) by mouth every 6 (six) hours as needed for moderate pain Max Daily Amount: 20 mg (Patient not taking: Reported on 3/7/2022 ) 20 tablet 0     No current facility-administered medications for this visit         Allergies   Allergen Reactions    Empagliflozin Other (See Comments)    Exenatide Other (See Comments)    Glipizide Other (See Comments)    Repaglinide Other (See Comments)       Past Medical History:   Diagnosis Date    [facility]omet[facility] cancer ([facility] )     Hyperlipidemia associated with type 2 diabetes mellitus ([facility] )     Morbid obesity with BMI of 40 0-44 9, adult ([facility] )     Type 2 diabetes mellitus without complication ([facility] )        Past Surgical History:   Procedure Laterality Date    CATARACT EXTRACTION W/ INTRAOCULAR LENS IMPLANT Bilateral     CHOLECYSTECTOMY OPEN      IR PORT PLACEMENT  3/7/2022    SALPINGOOPHORECTOMY N/A        OB History        1    Para   1    Term                AB        Living           SAB        IAB        Ectopic        Multiple        Live Births                     No family history on file  The following portions of the patient's history were reviewed and updated as appropriate: allergies, current medications, past family history, past medical history, past social history, past surgical history and problem list       Objective:    Blood pressure 132/80, pulse (!) 108, temperature (!) 97 3 °F (36 3 °C), temperature source Temporal, resp  rate 16, height 5' 3" (1 6 m), weight 107 kg (236 lb), SpO2 96 %  Body mass index is 41 81 kg/m²  Physical Exam  Constitutional:       General: She is not in acute distress  Appearance: Normal appearance  She is well-developed  She is obese  She is not ill-appearing, toxic-appearing or diaphoretic  HENT:      Head: Normocephalic and atraumatic  Eyes:      General: No scleral icterus  Right eye: No discharge  Left eye: No discharge  Extraocular Movements: Extraocular movements intact  Conjunctiva/sclera: Conjunctivae normal    Neck:      Thyroid: No thyromegaly  Cardiovascular:      Rate and Rhythm: Regular rhythm  Tachycardia present  Heart sounds: Normal heart sounds  No murmur heard  No friction rub  No gallop  Pulmonary:      Effort: No respiratory distress  Breath sounds: Normal breath sounds  No stridor  No wheezing or rhonchi  Abdominal:      General: There is no distension  Palpations: Abdomen is soft  There is no mass  Tenderness: There is no abdominal tenderness  There is no guarding or rebound  Hernia: No hernia is present  Genitourinary:     Comments:  The external female genitalia is normal  The bartholin's, uretheral and skenes glands are normal  The urethral meatus is normal (midline with no lesions)  Anus without fissure or lesion  Speculum exam reveals vagina without lesion or discharge  Cervix is normal appearing without visible lesions  No bleeding  No significant cystocele or rectocele noted  Bimanual exam notes a 10 week uterus with normal contour, mobility  No tenderness  Adnexa without masses or tenderness  Bladder is without fullness, mass or tenderness  Musculoskeletal:         General: No swelling, tenderness, deformity or signs of injury  Cervical back: Normal range of motion and neck supple  No rigidity  Right lower leg: Edema present  Left lower leg: Edema present  Lymphadenopathy:      Cervical: No cervical adenopathy  Skin:     General: Skin is warm and dry  Coloration: Skin is not jaundiced or pale  Findings: No bruising, erythema or rash  Neurological:      General: No focal deficit present  Mental Status: She is alert and oriented to person, place, and time  Mental status is at baseline  Cranial Nerves: No cranial nerve deficit  Motor: No weakness  Gait: Gait normal    Psychiatric:         Mood and Affect: Mood normal          Behavior: Behavior normal          Thought Content:  Thought content normal          Judgment: Judgment normal            Lab Results   Component Value Date     10 9 04/04/2022     Lab Results   Component Value Date    WBC 2 8 (L) 04/18/2022    HGB 9 3 (L) 04/18/2022    HCT 29 4 (L) 04/18/2022    MCV 81 04/18/2022     04/18/2022     Lab Results   Component Value Date    K 3 7 04/18/2022     04/18/2022    CO2 24 04/18/2022    BUN 14 04/18/2022    CREATININE 0 64 04/18/2022    AST 18 04/18/2022    ALT 26 04/18/2022    EGFR 97 04/18/2022        Trend:  Lab Results   Component Value Date     10 9 04/04/2022     11 4 03/15/2022     10 9 02/28/2022

## 2022-04-21 NOTE — H&P
Assessment/Plan:    Problem List Items Addressed This Visit        Genitourinary    Endometrial [facility] ([facility] ) - Primary     56-year-old with stage IV B high-grade endometrial cancer, liver, retroperitoneal lymph node, lung metastatic disease who has received 3 cycles of carboplatin and Taxol chemotherapy  Treatment course has been complicated by Taxol related arthralgias/neuropathy  I reviewed her CT of the chest abdomen pelvis, CBC, CMP  Her performance status is 1     1  I reviewed the CT findings in detail  She has stable disease  2  We discussed the risks and benefits of performing robotic assisted total laparoscopic hysterectomy and bilateral salpingo-oophorectomy and length  She understands that this would be largely a palliative procedure although there is some data suggesting benefit to removing the primary lesion  Although she had previously signed consent for a robotic assisted procedure, I reviewed the risks and benefits of the surgery  She understands the risks and benefits of the operation agrees to proceed as outlined  3  Will plan for cycle 4  Of carboplatin and Taxol and schedule surgery approximately 3 weeks post cycle 4 to allow her counts to recover adequately  4  Postoperatively, will add bevacizumab to carboplatin and Taxol                       CHIEF COMPLAINT:  Pre chemotherapy evaluation and treatment discussion          Problem:  Cancer Staging  Endometrial cancer [facility])  Staging form: Corpus Uteri - Carcinoma, AJCC 8th Edition  - Clinical: FIGO Stage IVB (cM1) - Signed by Jeana Ledbetter MD on 2/17/2022      Previous therapy:  Oncology History   [facility]omet[facility] ([facility] )   1/26/2022 Initial Diagnosis    Endometrial [facility] ([facility] )     2/17/2022 -  Cancer Staged    Staging form: Corpus Uteri - Carcinoma, AJCC 8th Edition  - Clinical: FIGO Stage IVB (cM1) - Signed by Jeana Ledbetter MD on 2/17/2022  Histologic grade (G): G3  Histologic grading system: 3 grade system 2/24/2022 -  Chemotherapy    Taxol 175 mg/m2 and carboplatin AUC 6 every 21 days  Taxol dose-reduced to 135 mg/m2 with cycle 2 due to arthralgias/myalgias  She has received 3 cycles     3/11/2022 Genomic Testing    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, ER/NH positive, microsatellite stable  No other targeted therapy opportunities  Patient ID: Albert Burgess is a 78 y o  female  Returns for pre chemotherapy evaluation and treatment discussion  She has received 3 cycles of carboplatin and Taxol  The Taxol required dose reduction due to neuropathy/arthralgias in her lower extremities  She is able to perform her activities of daily living  Overall, the chemotherapy has not significantly affected her activities of daily living  She does note some restless leg symptoms at night  Labs from 4/18/2022 reveal a total white blood cell count 2 8, hemoglobin 9 3 grams/deciliter and a platelet count of [id number]  Blood glucose was 250 mg/dL  Serum creatinine 0 64 mg/dL  The remainder of her CMP was normal   She had a CT scan of the chest abdomen pelvis after 3 cycles which revealed stable disease  I also reviewed genomic testing data  Mismatch repair was intact  No other interval change in her medications or medical history since her last visit        Review of Systems    Current Outpatient Medications   Medication Sig Dispense Refill    acetaminophen (TYLENOL) 650 mg CR tablet Take 1,300 mg by mouth 2 (two) times a day        Bacillus Coagulans-Inulin (Probiotic) 1-250 BILLION-MG CAPS as directed      BD Pen Needle Yoana 2nd Gen 32G X 4 MM MISC 2 (two) times a day      Contour Next Test test strip 2 (two) times a day Test      fexofenadine (Allegra Allergy) 180 MG tablet every 24 hours      furosemide (LASIX) 40 mg tablet Take 1 tablet by mouth daily      gabapentin (NEURONTIN) 300 mg capsule Take 300 mg by mouth 3 (three) times a day      Gabapentin (NEURONTIN) 300 mg/6mL solution 3 (three) times a day      ibuprofen (MOTRIN) 200 mg tablet Take 400 mg by mouth every 6 (six) hours as needed for mild pain      lidocaine-prilocaine (EMLA) cream Apply to port site 30 min prior to labs/chemo 30 g 2    lisinopril (ZESTRIL) 20 mg tablet Take 1 tablet by mouth daily      LORazepam (ATIVAN) 0 5 mg tablet Take 0 5 mg by mouth daily at bedtime      lorazepam (ATIVAN) 0 5 mg/mL GEL transdermal gel Take 1 tablet by mouth daily at bedtime      metFORMIN (GLUCOPHAGE) 1000 MG tablet Take 1 tablet by mouth 2 (two) times a day with meals      Microlet Lancets MISC 2 (two) times a day Test      omeprazole (PriLOSEC) 20 mg delayed release capsule every 24 hours      ondansetron (ZOFRAN) 8 mg tablet Take 1 tablet (8 mg total) by mouth every 8 (eight) hours as needed for nausea or vomiting 20 tablet 1    Rosuvastatin Calcium 20 MG CPSP Take 1 tablet by mouth daily      sertraline (ZOLOFT) 25 mg tablet Take 1 tablet by mouth daily      sitaGLIPtin (Januvia) 100 mg tablet Take 1 tablet by mouth daily      Toujeo SoloStar 300 units/mL CONCENTRATED U-300 injection pen (1-unit dial) INJECT 32 UNITS IN THE MORNING AND 34 UNITS AT BEDTIME SUBCUTANEOUSLY      oxyCODONE (Roxicodone) 5 immediate release tablet Take 1 tablet (5 mg total) by mouth every 6 (six) hours as needed for moderate pain Max Daily Amount: 20 mg (Patient not taking: Reported on 3/7/2022 ) 20 tablet 0     No current facility-administered medications for this visit         Allergies   Allergen Reactions    Empagliflozin Other (See Comments)    Exenatide Other (See Comments)    Glipizide Other (See Comments)    Repaglinide Other (See Comments)       Past Medical History:   Diagnosis Date    Endometrial cancer ([facility] )     Hyperlipidemia associated with type 2 diabetes mellitus ([facility] )     Morbid obesity with BMI of 40 0-44 9, adult ([facility] )     Type 2 diabetes mellitus without complication ([facility] )        Past Surgical History:   Procedure Laterality Date    CATARACT EXTRACTION W/ INTRAOCULAR LENS IMPLANT Bilateral     CHOLECYSTECTOMY OPEN      IR PORT PLACEMENT  3/7/2022    SALPINGOOPHORECTOMY N/A        OB History        1    Para   1    Term                AB        Living           SAB        IAB        Ectopic        Multiple        Live Births                     No family history on file  The following portions of the patient's history were reviewed and updated as appropriate: allergies, current medications, past family history, past medical history, past social history, past surgical history and problem list       Objective:    Blood pressure 132/80, pulse (!) 108, temperature (!) 97 3 °F (36 3 °C), temperature source Temporal, resp  rate 16, height 5' 3" (1 6 m), weight 107 kg (236 lb), SpO2 96 %  Body mass index is 41 81 kg/m²  Physical Exam  Constitutional:       General: She is not in acute distress  Appearance: Normal appearance  She is well-developed  She is obese  She is not ill-appearing, toxic-appearing or diaphoretic  HENT:      Head: Normocephalic and atraumatic  Eyes:      General: No scleral icterus  Right eye: No discharge  Left eye: No discharge  Extraocular Movements: Extraocular movements intact  Conjunctiva/sclera: Conjunctivae normal    Neck:      Thyroid: No thyromegaly  Cardiovascular:      Rate and Rhythm: Regular rhythm  Tachycardia present  Heart sounds: Normal heart sounds  No murmur heard  No friction rub  No gallop  Pulmonary:      Effort: No respiratory distress  Breath sounds: Normal breath sounds  No stridor  No wheezing or rhonchi  Abdominal:      General: There is no distension  Palpations: Abdomen is soft  There is no mass  Tenderness: There is no abdominal tenderness  There is no guarding or rebound  Hernia: No hernia is present  Genitourinary:     Comments:  The external female genitalia is normal  The bartholin's, uretheral and skenes glands are normal  The urethral meatus is normal (midline with no lesions)  Anus without fissure or lesion  Speculum exam reveals vagina without lesion or discharge  Cervix is normal appearing without visible lesions  No bleeding  No significant cystocele or rectocele noted  Bimanual exam notes a 10 week uterus with normal contour, mobility  No tenderness  Adnexa without masses or tenderness  Bladder is without fullness, mass or tenderness  Musculoskeletal:         General: No swelling, tenderness, deformity or signs of injury  Cervical back: Normal range of motion and neck supple  No rigidity  Right lower leg: Edema present  Left lower leg: Edema present  Lymphadenopathy:      Cervical: No cervical adenopathy  Skin:     General: Skin is warm and dry  Coloration: Skin is not jaundiced or pale  Findings: No bruising, erythema or rash  Neurological:      General: No focal deficit present  Mental Status: She is alert and oriented to person, place, and time  Mental status is at baseline  Cranial Nerves: No cranial nerve deficit  Motor: No weakness  Gait: Gait normal    Psychiatric:         Mood and Affect: Mood normal          Behavior: Behavior normal          Thought Content:  Thought content normal          Judgment: Judgment normal            Lab Results   Component Value Date     10 9 04/04/2022     Lab Results   Component Value Date    WBC 2 8 (L) 04/18/2022    HGB 9 3 (L) 04/18/2022    HCT 29 4 (L) 04/18/2022    MCV 81 04/18/2022     04/18/2022     Lab Results   Component Value Date    K 3 7 04/18/2022     04/18/2022    CO2 24 04/18/2022    BUN 14 04/18/2022    CREATININE 0 64 04/18/2022    AST 18 04/18/2022    ALT 26 04/18/2022    EGFR 97 04/18/2022        Trend:  Lab Results   Component Value Date     10 9 04/04/2022     11 4 03/15/2022     10 9 02/28/2022

## 2022-04-27 RX ORDER — PALONOSETRON 0.05 MG/ML
0.25 INJECTION, SOLUTION INTRAVENOUS ONCE
Status: CANCELLED | OUTPATIENT
Start: 2022-04-28

## 2022-04-27 RX ORDER — SODIUM CHLORIDE 9 MG/ML
20 INJECTION, SOLUTION INTRAVENOUS ONCE
Status: CANCELLED | OUTPATIENT
Start: 2022-04-28

## 2022-04-28 ENCOUNTER — HOSPITAL ENCOUNTER (OUTPATIENT)
Dept: INFUSION CENTER | Facility: HOSPITAL | Age: 67
Discharge: HOME | End: 2022-04-28
Attending: OBSTETRICS & GYNECOLOGY
Payer: COMMERCIAL

## 2022-04-28 VITALS
OXYGEN SATURATION: 95 % | TEMPERATURE: 98.2 F | HEART RATE: 99 BPM | SYSTOLIC BLOOD PRESSURE: 140 MMHG | HEIGHT: 63 IN | RESPIRATION RATE: 16 BRPM | BODY MASS INDEX: 41.21 KG/M2 | DIASTOLIC BLOOD PRESSURE: 63 MMHG | WEIGHT: 232.59 LBS

## 2022-04-28 DIAGNOSIS — C54.1 ENDOMETRIAL CANCER (HCC): Primary | ICD-10-CM

## 2022-04-28 PROCEDURE — 96367 TX/PROPH/DG ADDL SEQ IV INF: CPT

## 2022-04-28 PROCEDURE — 96415 CHEMO IV INFUSION ADDL HR: CPT

## 2022-04-28 PROCEDURE — 96413 CHEMO IV INFUSION 1 HR: CPT

## 2022-04-28 PROCEDURE — 96375 TX/PRO/DX INJ NEW DRUG ADDON: CPT

## 2022-04-28 PROCEDURE — 96417 CHEMO IV INFUS EACH ADDL SEQ: CPT

## 2022-04-28 RX ORDER — SODIUM CHLORIDE 9 MG/ML
20 INJECTION, SOLUTION INTRAVENOUS ONCE
Status: COMPLETED | OUTPATIENT
Start: 2022-04-28 | End: 2022-04-28

## 2022-04-28 RX ORDER — PALONOSETRON 0.05 MG/ML
0.25 INJECTION, SOLUTION INTRAVENOUS ONCE
Status: COMPLETED | OUTPATIENT
Start: 2022-04-28 | End: 2022-04-28

## 2022-04-28 RX ADMIN — DIPHENHYDRAMINE HYDROCHLORIDE 25 MG: 50 INJECTION, SOLUTION INTRAMUSCULAR; INTRAVENOUS at 09:20

## 2022-04-28 RX ADMIN — FAMOTIDINE 20 MG: 10 INJECTION INTRAVENOUS at 09:44

## 2022-04-28 RX ADMIN — DEXAMETHASONE SODIUM PHOSPHATE 20 MG: 10 INJECTION, SOLUTION INTRAMUSCULAR; INTRAVENOUS at 08:54

## 2022-04-28 RX ADMIN — CARBOPLATIN 604.2 MG: 10 INJECTION, SOLUTION INTRAVENOUS at 14:04

## 2022-04-28 RX ADMIN — SODIUM CHLORIDE 150 MG: 9 INJECTION, SOLUTION INTRAVENOUS at 10:16

## 2022-04-28 RX ADMIN — PACLITAXEL 279.6 MG: 6 INJECTION, SOLUTION INTRAVENOUS at 10:57

## 2022-04-28 RX ADMIN — SODIUM CHLORIDE 20 ML/HR: 9 INJECTION, SOLUTION INTRAVENOUS at 08:54

## 2022-04-28 RX ADMIN — PALONOSETRON HYDROCHLORIDE 0.25 MG: 0.05 INJECTION, SOLUTION INTRAVENOUS at 09:07

## 2022-04-28 NOTE — PROGRESS NOTES
Pt here today for chemo tolerated well no adverse reactions  AVS provided and pt left with steady gait

## 2022-04-29 ENCOUNTER — TELEPHONE (OUTPATIENT)
Dept: GYNECOLOGIC ONCOLOGY | Facility: CLINIC | Age: 67
End: 2022-04-29

## 2022-04-29 NOTE — TELEPHONE ENCOUNTER
Return call placed to patient, LMOM  Will continue weekly labs until surgery and then plan to re-start chemotherapy post-op

## 2022-04-29 NOTE — TELEPHONE ENCOUNTER
Patient called in regarding the next steps as her chemo calender is finished  She wants information on what to do next, if she need to continue to get labs done     Best call back number:[phone]

## 2022-05-06 DIAGNOSIS — C54.1 ENDOMETRIAL CANCER (HCC): Primary | ICD-10-CM

## 2022-05-11 ENCOUNTER — LAB REQUISITION (OUTPATIENT)
Dept: LAB | Facility: HOSPITAL | Age: 67
End: 2022-05-11
Payer: COMMERCIAL

## 2022-05-11 ENCOUNTER — APPOINTMENT (OUTPATIENT)
Dept: LAB | Facility: HOSPITAL | Age: 67
End: 2022-05-11
Attending: OBSTETRICS & GYNECOLOGY
Payer: COMMERCIAL

## 2022-05-11 DIAGNOSIS — C54.1 ENDOMETRIAL CANCER (HCC): ICD-10-CM

## 2022-05-11 DIAGNOSIS — C54.1 ENDOMETRIAL SARCOMA (HCC): ICD-10-CM

## 2022-05-11 DIAGNOSIS — C54.1 MALIGNANT NEOPLASM OF ENDOMETRIUM (HCC): ICD-10-CM

## 2022-05-11 LAB
ALBUMIN SERPL-MCNC: 3.7 G/DL (ref 3.8–4.8)
ALBUMIN/GLOB SERPL: 1.8 {RATIO} (ref 1.2–2.2)
ALP SERPL-CCNC: 99 IU/L (ref 44–121)
ALT SERPL-CCNC: 25 IU/L (ref 0–32)
AST SERPL-CCNC: 19 IU/L (ref 0–40)
BASOPHILS # BLD AUTO: 0 X10E3/UL (ref 0–0.2)
BASOPHILS NFR BLD AUTO: 0 %
BILIRUB SERPL-MCNC: 0.3 MG/DL (ref 0–1.2)
BUN SERPL-MCNC: 19 MG/DL (ref 8–27)
BUN/CREAT SERPL: 25 (ref 12–28)
CALCIUM SERPL-MCNC: 9.2 MG/DL (ref 8.7–10.3)
CANCER AG125 SERPL-ACNC: 12.2 U/ML (ref 0–38.1)
CHLORIDE SERPL-SCNC: 99 MMOL/L (ref 96–106)
CO2 SERPL-SCNC: 22 MMOL/L (ref 20–29)
CREAT SERPL-MCNC: 0.77 MG/DL (ref 0.57–1)
EGFR: 84 ML/MIN/1.73
EOSINOPHIL # BLD AUTO: 0.1 X10E3/UL (ref 0–0.4)
EOSINOPHIL NFR BLD AUTO: 2 %
ERYTHROCYTE [DISTWIDTH] IN BLOOD BY AUTOMATED COUNT: 21.6 % (ref 11.7–15.4)
EST. AVERAGE GLUCOSE BLD GHB EST-MCNC: 192 MG/DL
GLOBULIN SER-MCNC: 2.1 G/DL (ref 1.5–4.5)
GLUCOSE SERPL-MCNC: 246 MG/DL (ref 65–99)
HBA1C MFR BLD: 8.3 %
HCT VFR BLD AUTO: 27.3 % (ref 34–46.6)
HGB BLD-MCNC: 8.9 G/DL (ref 11.1–15.9)
IMM GRANULOCYTES # BLD: 0 X10E3/UL (ref 0–0.1)
IMM GRANULOCYTES NFR BLD: 0 %
LYMPHOCYTES # BLD AUTO: 0.9 X10E3/UL (ref 0.7–3.1)
LYMPHOCYTES NFR BLD AUTO: 30 %
MAGNESIUM SERPL-MCNC: 1.6 MG/DL (ref 1.6–2.3)
MCH RBC QN AUTO: 26.8 PG (ref 26.6–33)
MCHC RBC AUTO-ENTMCNC: 32.6 G/DL (ref 31.5–35.7)
MCV RBC AUTO: 82 FL (ref 79–97)
MONOCYTES # BLD AUTO: 0.4 X10E3/UL (ref 0.1–0.9)
MONOCYTES NFR BLD AUTO: 13 %
MORPHOLOGY BLD-IMP: ABNORMAL
NEUTROPHILS # BLD AUTO: 1.6 X10E3/UL (ref 1.4–7)
NEUTROPHILS NFR BLD AUTO: 55 %
NRBC BLD AUTO-RTO: 1 % (ref 0–0)
PLATELET # BLD AUTO: 87 X10E3/UL (ref 150–450)
POTASSIUM SERPL-SCNC: 3.6 MMOL/L (ref 3.5–5.2)
PROT SERPL-MCNC: 5.8 G/DL (ref 6–8.5)
RBC # BLD AUTO: 3.32 X10E6/UL (ref 3.77–5.28)
SODIUM SERPL-SCNC: 139 MMOL/L (ref 134–144)
WBC # BLD AUTO: 2.9 X10E3/UL (ref 3.4–10.8)

## 2022-05-11 PROCEDURE — 86901 BLOOD TYPING SEROLOGIC RH(D): CPT | Performed by: OBSTETRICS & GYNECOLOGY

## 2022-05-11 PROCEDURE — 93005 ELECTROCARDIOGRAM TRACING: CPT

## 2022-05-11 PROCEDURE — 86850 RBC ANTIBODY SCREEN: CPT | Performed by: OBSTETRICS & GYNECOLOGY

## 2022-05-11 PROCEDURE — 86900 BLOOD TYPING SEROLOGIC ABO: CPT | Performed by: OBSTETRICS & GYNECOLOGY

## 2022-05-11 PROCEDURE — 83036 HEMOGLOBIN GLYCOSYLATED A1C: CPT

## 2022-05-11 PROCEDURE — 36415 COLL VENOUS BLD VENIPUNCTURE: CPT

## 2022-05-12 LAB
ABO GROUP BLD: NORMAL
BLD GP AB SCN SERPL QL: NEGATIVE
RH BLD: POSITIVE
SPECIMEN EXPIRATION DATE: NORMAL

## 2022-05-14 LAB
ATRIAL RATE: 97 BPM
P AXIS: 107 DEGREES
PR INTERVAL: 204 MS
QRS AXIS: 20 DEGREES
QRSD INTERVAL: 106 MS
QT INTERVAL: 342 MS
QTC INTERVAL: 434 MS
T WAVE AXIS: -16 DEGREES
VENTRICULAR RATE: 97 BPM

## 2022-05-14 PROCEDURE — 93010 ELECTROCARDIOGRAM REPORT: CPT | Performed by: INTERNAL MEDICINE

## 2022-05-17 ENCOUNTER — TELEPHONE (OUTPATIENT)
Dept: GYNECOLOGIC ONCOLOGY | Facility: CLINIC | Age: 67
End: 2022-05-17

## 2022-05-17 NOTE — TELEPHONE ENCOUNTER
Attempted to place return call  Unable to speak to representative directly at [facility]

## 2022-05-17 NOTE — TELEPHONE ENCOUNTER
Erlinda from [employer] called in regarding the order sent in for the CA-125, [facility] doesn't offer a three week standing order- Please advise

## 2022-05-24 ENCOUNTER — ANESTHESIA EVENT (OUTPATIENT)
Dept: PERIOP | Facility: HOSPITAL | Age: 67
End: 2022-05-24
Payer: COMMERCIAL

## 2022-05-24 LAB
ALBUMIN SERPL-MCNC: 3.9 G/DL (ref 3.8–4.8)
ALBUMIN/GLOB SERPL: 2.1 {RATIO} (ref 1.2–2.2)
ALP SERPL-CCNC: 104 IU/L (ref 44–121)
ALT SERPL-CCNC: 16 IU/L (ref 0–32)
AST SERPL-CCNC: 15 IU/L (ref 0–40)
BASOPHILS # BLD AUTO: 0 X10E3/UL (ref 0–0.2)
BASOPHILS NFR BLD AUTO: 0 %
BILIRUB SERPL-MCNC: 0.3 MG/DL (ref 0–1.2)
BUN SERPL-MCNC: 19 MG/DL (ref 8–27)
BUN/CREAT SERPL: 22 (ref 12–28)
CALCIUM SERPL-MCNC: 9.4 MG/DL (ref 8.7–10.3)
CHLORIDE SERPL-SCNC: 99 MMOL/L (ref 96–106)
CO2 SERPL-SCNC: 24 MMOL/L (ref 20–29)
CREAT SERPL-MCNC: 0.88 MG/DL (ref 0.57–1)
EGFR: 72 ML/MIN/1.73
EOSINOPHIL # BLD AUTO: 0.1 X10E3/UL (ref 0–0.4)
EOSINOPHIL NFR BLD AUTO: 1 %
ERYTHROCYTE [DISTWIDTH] IN BLOOD BY AUTOMATED COUNT: 22.4 % (ref 11.7–15.4)
GLOBULIN SER-MCNC: 1.9 G/DL (ref 1.5–4.5)
GLUCOSE SERPL-MCNC: 160 MG/DL (ref 65–99)
HCT VFR BLD AUTO: 28.1 % (ref 34–46.6)
HGB BLD-MCNC: 8.7 G/DL (ref 11.1–15.9)
IMM GRANULOCYTES # BLD: 0 X10E3/UL (ref 0–0.1)
IMM GRANULOCYTES NFR BLD: 1 %
LYMPHOCYTES # BLD AUTO: 1.4 X10E3/UL (ref 0.7–3.1)
LYMPHOCYTES NFR BLD AUTO: 25 %
MAGNESIUM SERPL-MCNC: 1.6 MG/DL (ref 1.6–2.3)
MCH RBC QN AUTO: 26.9 PG (ref 26.6–33)
MCHC RBC AUTO-ENTMCNC: 31 G/DL (ref 31.5–35.7)
MCV RBC AUTO: 87 FL (ref 79–97)
MONOCYTES # BLD AUTO: 0.7 X10E3/UL (ref 0.1–0.9)
MONOCYTES NFR BLD AUTO: 13 %
MORPHOLOGY BLD-IMP: ABNORMAL
NEUTROPHILS # BLD AUTO: 3.4 X10E3/UL (ref 1.4–7)
NEUTROPHILS NFR BLD AUTO: 60 %
PLATELET # BLD AUTO: 181 X10E3/UL (ref 150–450)
POTASSIUM SERPL-SCNC: 4.1 MMOL/L (ref 3.5–5.2)
PROT SERPL-MCNC: 5.8 G/DL (ref 6–8.5)
RBC # BLD AUTO: 3.23 X10E6/UL (ref 3.77–5.28)
SODIUM SERPL-SCNC: 138 MMOL/L (ref 134–144)
WBC # BLD AUTO: 5.7 X10E3/UL (ref 3.4–10.8)

## 2022-05-24 NOTE — PRE-PROCEDURE INSTRUCTIONS
Pre-Surgery Instructions:   Medication Instructions    acetaminophen (TYLENOL) 650 mg CR tablet Uses PRN- OK to take day of surgery    fexofenadine (ALLEGRA) 180 MG tablet Uses PRN- OK to take day of surgery    furosemide (LASIX) 40 mg tablet Hold day of surgery   gabapentin (NEURONTIN) 300 mg capsule Take day of surgery   ibuprofen (MOTRIN) 200 mg tablet Stop taking 7 days prior to surgery   lidocaine-prilocaine (EMLA) cream Hold day of surgery   lisinopril (ZESTRIL) 20 mg tablet Hold day of surgery   magnesium oxide (MAG-OX) 400 mg Hold day of surgery   metFORMIN (GLUCOPHAGE) 1000 MG tablet Hold day of surgery   omeprazole (PriLOSEC) 20 mg delayed release capsule Take day of surgery   ondansetron (ZOFRAN) 8 mg tablet Uses PRN- OK to take day of surgery    Rosuvastatin Calcium 20 MG CPSP Hold day of surgery   sertraline (ZOLOFT) 25 mg tablet Take day of surgery   sitaGLIPtin (JANUVIA) 100 mg tablet Hold day of surgery   Toujeo SoloStar 300 units/mL CONCENTRATED U-300 injection pen (1-unit dial) Instructions provided by MD    [facility] preop instructions reviewed with pt  Pt has surgical soap  ERAS reviewed - pt has 2 drinks

## 2022-05-27 ENCOUNTER — TELEPHONE (OUTPATIENT)
Dept: GYNECOLOGIC ONCOLOGY | Facility: CLINIC | Age: 67
End: 2022-05-27

## 2022-05-27 NOTE — TELEPHONE ENCOUNTER
Spoke with patient who is having arthritic pain due to stopping her ibuprofen for surgery  She does get relief from Oxycodone (she has a script for this already)  I told her it was okay to take prn for pain until she gets to the post operative phase of her treatment

## 2022-05-27 NOTE — TELEPHONE ENCOUNTER
Patient called in distressed and emotional as she is in pain and discomfort  She had to stop taking ibuprofen, and started taking tylenol  The tylenol is not working either   Please advise and call back

## 2022-05-31 ENCOUNTER — HOSPITAL ENCOUNTER (OUTPATIENT)
Facility: HOSPITAL | Age: 67
Setting detail: OUTPATIENT SURGERY
Discharge: HOME | End: 2022-06-01
Attending: OBSTETRICS & GYNECOLOGY | Admitting: OBSTETRICS & GYNECOLOGY
Payer: COMMERCIAL

## 2022-05-31 ENCOUNTER — ANESTHESIA (OUTPATIENT)
Dept: PERIOP | Facility: HOSPITAL | Age: 67
End: 2022-05-31
Payer: COMMERCIAL

## 2022-05-31 DIAGNOSIS — C54.1 ENDOMETRIAL CANCER (HCC): ICD-10-CM

## 2022-05-31 DIAGNOSIS — E11.9 TYPE 2 DIABETES MELLITUS WITHOUT COMPLICATION, UNSPECIFIED WHETHER LONG TERM INSULIN USE (HCC): Primary | ICD-10-CM

## 2022-05-31 LAB
ABO GROUP BLD: NORMAL
ABO GROUP BLD: NORMAL
BASOPHILS # BLD AUTO: 0.01 THOUSANDS/ΜL (ref 0–0.1)
BASOPHILS # BLD AUTO: 0.02 THOUSANDS/ΜL (ref 0–0.1)
BASOPHILS NFR BLD AUTO: 0 % (ref 0–1)
BASOPHILS NFR BLD AUTO: 0 % (ref 0–1)
BLD GP AB SCN SERPL QL: NEGATIVE
EOSINOPHIL # BLD AUTO: 0.01 THOUSAND/ΜL (ref 0–0.61)
EOSINOPHIL # BLD AUTO: 0.06 THOUSAND/ΜL (ref 0–0.61)
EOSINOPHIL NFR BLD AUTO: 0 % (ref 0–6)
EOSINOPHIL NFR BLD AUTO: 1 % (ref 0–6)
ERYTHROCYTE [DISTWIDTH] IN BLOOD BY AUTOMATED COUNT: 21.3 % (ref 11.6–15.1)
ERYTHROCYTE [DISTWIDTH] IN BLOOD BY AUTOMATED COUNT: 21.7 % (ref 11.6–15.1)
GLUCOSE SERPL-MCNC: 215 MG/DL (ref 65–140)
GLUCOSE SERPL-MCNC: 245 MG/DL (ref 65–140)
GLUCOSE SERPL-MCNC: 256 MG/DL (ref 65–140)
GLUCOSE SERPL-MCNC: 260 MG/DL (ref 65–140)
GLUCOSE SERPL-MCNC: 326 MG/DL (ref 65–140)
GLUCOSE SERPL-MCNC: 402 MG/DL (ref 65–140)
HCT VFR BLD AUTO: 25.2 % (ref 34.8–46.1)
HCT VFR BLD AUTO: 25.6 % (ref 34.8–46.1)
HCT VFR BLD AUTO: 26.4 % (ref 34.8–46.1)
HGB BLD-MCNC: 7.5 G/DL (ref 11.5–15.4)
HGB BLD-MCNC: 7.5 G/DL (ref 11.5–15.4)
HGB BLD-MCNC: 8 G/DL (ref 11.5–15.4)
IMM GRANULOCYTES # BLD AUTO: 0.07 THOUSAND/UL (ref 0–0.2)
IMM GRANULOCYTES # BLD AUTO: 0.08 THOUSAND/UL (ref 0–0.2)
IMM GRANULOCYTES NFR BLD AUTO: 1 % (ref 0–2)
IMM GRANULOCYTES NFR BLD AUTO: 1 % (ref 0–2)
LYMPHOCYTES # BLD AUTO: 0.51 THOUSANDS/ΜL (ref 0.6–4.47)
LYMPHOCYTES # BLD AUTO: 1.21 THOUSANDS/ΜL (ref 0.6–4.47)
LYMPHOCYTES NFR BLD AUTO: 19 % (ref 14–44)
LYMPHOCYTES NFR BLD AUTO: 8 % (ref 14–44)
MCH RBC QN AUTO: 27.4 PG (ref 26.8–34.3)
MCH RBC QN AUTO: 28.2 PG (ref 26.8–34.3)
MCHC RBC AUTO-ENTMCNC: 29.8 G/DL (ref 31.4–37.4)
MCHC RBC AUTO-ENTMCNC: 30.3 G/DL (ref 31.4–37.4)
MCV RBC AUTO: 90 FL (ref 82–98)
MCV RBC AUTO: 95 FL (ref 82–98)
MONOCYTES # BLD AUTO: 0.67 THOUSAND/ΜL (ref 0.17–1.22)
MONOCYTES # BLD AUTO: 0.88 THOUSAND/ΜL (ref 0.17–1.22)
MONOCYTES NFR BLD AUTO: 11 % (ref 4–12)
MONOCYTES NFR BLD AUTO: 14 % (ref 4–12)
NEUTROPHILS # BLD AUTO: 4.2 THOUSANDS/ΜL (ref 1.85–7.62)
NEUTROPHILS # BLD AUTO: 5.05 THOUSANDS/ΜL (ref 1.85–7.62)
NEUTS SEG NFR BLD AUTO: 65 % (ref 43–75)
NEUTS SEG NFR BLD AUTO: 80 % (ref 43–75)
NRBC BLD AUTO-RTO: 0 /100 WBCS
NRBC BLD AUTO-RTO: 0 /100 WBCS
PLATELET # BLD AUTO: 196 THOUSANDS/UL (ref 149–390)
PLATELET # BLD AUTO: 216 THOUSANDS/UL (ref 149–390)
PMV BLD AUTO: 9 FL (ref 8.9–12.7)
PMV BLD AUTO: 9 FL (ref 8.9–12.7)
RBC # BLD AUTO: 2.66 MILLION/UL (ref 3.81–5.12)
RBC # BLD AUTO: 2.92 MILLION/UL (ref 3.81–5.12)
RH BLD: POSITIVE
RH BLD: POSITIVE
SPECIMEN EXPIRATION DATE: NORMAL
WBC # BLD AUTO: 6.32 THOUSAND/UL (ref 4.31–10.16)
WBC # BLD AUTO: 6.45 THOUSAND/UL (ref 4.31–10.16)

## 2022-05-31 PROCEDURE — 85025 COMPLETE CBC W/AUTO DIFF WBC: CPT

## 2022-05-31 PROCEDURE — NC001 PR NO CHARGE: Performed by: PHYSICIAN ASSISTANT

## 2022-05-31 PROCEDURE — 58661 LAPAROSCOPY REMOVE ADNEXA: CPT | Performed by: OBSTETRICS & GYNECOLOGY

## 2022-05-31 PROCEDURE — 86901 BLOOD TYPING SEROLOGIC RH(D): CPT | Performed by: OBSTETRICS & GYNECOLOGY

## 2022-05-31 PROCEDURE — 88302 TISSUE EXAM BY PATHOLOGIST: CPT | Performed by: PATHOLOGY

## 2022-05-31 PROCEDURE — 85018 HEMOGLOBIN: CPT | Performed by: OBSTETRICS & GYNECOLOGY

## 2022-05-31 PROCEDURE — 88305 TISSUE EXAM BY PATHOLOGIST: CPT | Performed by: PATHOLOGY

## 2022-05-31 PROCEDURE — 86923 COMPATIBILITY TEST ELECTRIC: CPT

## 2022-05-31 PROCEDURE — 82948 REAGENT STRIP/BLOOD GLUCOSE: CPT

## 2022-05-31 PROCEDURE — 86900 BLOOD TYPING SEROLOGIC ABO: CPT | Performed by: OBSTETRICS & GYNECOLOGY

## 2022-05-31 PROCEDURE — 85025 COMPLETE CBC W/AUTO DIFF WBC: CPT | Performed by: OBSTETRICS & GYNECOLOGY

## 2022-05-31 PROCEDURE — 99244 OFF/OP CNSLTJ NEW/EST MOD 40: CPT | Performed by: INTERNAL MEDICINE

## 2022-05-31 PROCEDURE — S2900 ROBOTIC SURGICAL SYSTEM: HCPCS | Performed by: OBSTETRICS & GYNECOLOGY

## 2022-05-31 PROCEDURE — 86850 RBC ANTIBODY SCREEN: CPT | Performed by: OBSTETRICS & GYNECOLOGY

## 2022-05-31 PROCEDURE — 85014 HEMATOCRIT: CPT | Performed by: OBSTETRICS & GYNECOLOGY

## 2022-05-31 PROCEDURE — NC001 PR NO CHARGE: Performed by: OBSTETRICS & GYNECOLOGY

## 2022-05-31 RX ORDER — SODIUM CHLORIDE, SODIUM LACTATE, POTASSIUM CHLORIDE, CALCIUM CHLORIDE 600; 310; 30; 20 MG/100ML; MG/100ML; MG/100ML; MG/100ML
125 INJECTION, SOLUTION INTRAVENOUS CONTINUOUS
Status: DISCONTINUED | OUTPATIENT
Start: 2022-05-31 | End: 2022-05-31

## 2022-05-31 RX ORDER — GABAPENTIN 300 MG/1
300 CAPSULE ORAL 3 TIMES DAILY
Status: DISCONTINUED | OUTPATIENT
Start: 2022-05-31 | End: 2022-06-01 | Stop reason: HOSPADM

## 2022-05-31 RX ORDER — ALBUTEROL SULFATE 2.5 MG/3ML
2.5 SOLUTION RESPIRATORY (INHALATION) ONCE
Status: COMPLETED | OUTPATIENT
Start: 2022-05-31 | End: 2022-05-31

## 2022-05-31 RX ORDER — LIDOCAINE HYDROCHLORIDE 20 MG/ML
INJECTION, SOLUTION EPIDURAL; INFILTRATION; INTRACAUDAL; PERINEURAL AS NEEDED
Status: DISCONTINUED | OUTPATIENT
Start: 2022-05-31 | End: 2022-05-31

## 2022-05-31 RX ORDER — ALBUMIN, HUMAN INJ 5% 5 %
SOLUTION INTRAVENOUS CONTINUOUS PRN
Status: DISCONTINUED | OUTPATIENT
Start: 2022-05-31 | End: 2022-05-31

## 2022-05-31 RX ORDER — METOCLOPRAMIDE HYDROCHLORIDE 5 MG/ML
INJECTION INTRAMUSCULAR; INTRAVENOUS AS NEEDED
Status: DISCONTINUED | OUTPATIENT
Start: 2022-05-31 | End: 2022-05-31

## 2022-05-31 RX ORDER — ACETAMINOPHEN 325 MG/1
975 TABLET ORAL EVERY 8 HOURS SCHEDULED
Status: DISCONTINUED | OUTPATIENT
Start: 2022-05-31 | End: 2022-06-01 | Stop reason: HOSPADM

## 2022-05-31 RX ORDER — PROPOFOL 10 MG/ML
INJECTION, EMULSION INTRAVENOUS AS NEEDED
Status: DISCONTINUED | OUTPATIENT
Start: 2022-05-31 | End: 2022-05-31

## 2022-05-31 RX ORDER — DIPHENHYDRAMINE HYDROCHLORIDE 50 MG/ML
12.5 INJECTION INTRAMUSCULAR; INTRAVENOUS ONCE AS NEEDED
Status: DISCONTINUED | OUTPATIENT
Start: 2022-05-31 | End: 2022-05-31 | Stop reason: HOSPADM

## 2022-05-31 RX ORDER — CEFAZOLIN SODIUM 2 G/50ML
2000 SOLUTION INTRAVENOUS ONCE
Status: COMPLETED | OUTPATIENT
Start: 2022-05-31 | End: 2022-05-31

## 2022-05-31 RX ORDER — INSULIN GLARGINE 100 [IU]/ML
30 INJECTION, SOLUTION SUBCUTANEOUS
Status: DISCONTINUED | OUTPATIENT
Start: 2022-05-31 | End: 2022-06-01 | Stop reason: HOSPADM

## 2022-05-31 RX ORDER — HYDROMORPHONE HCL/PF 1 MG/ML
SYRINGE (ML) INJECTION AS NEEDED
Status: DISCONTINUED | OUTPATIENT
Start: 2022-05-31 | End: 2022-05-31

## 2022-05-31 RX ORDER — PANTOPRAZOLE SODIUM 20 MG/1
20 TABLET, DELAYED RELEASE ORAL
Status: DISCONTINUED | OUTPATIENT
Start: 2022-06-01 | End: 2022-06-01 | Stop reason: HOSPADM

## 2022-05-31 RX ORDER — ONDANSETRON 2 MG/ML
4 INJECTION INTRAMUSCULAR; INTRAVENOUS ONCE AS NEEDED
Status: DISCONTINUED | OUTPATIENT
Start: 2022-05-31 | End: 2022-05-31 | Stop reason: HOSPADM

## 2022-05-31 RX ORDER — SERTRALINE HYDROCHLORIDE 25 MG/1
25 TABLET, FILM COATED ORAL DAILY
Status: DISCONTINUED | OUTPATIENT
Start: 2022-05-31 | End: 2022-06-01 | Stop reason: HOSPADM

## 2022-05-31 RX ORDER — NEOSTIGMINE METHYLSULFATE 1 MG/ML
INJECTION INTRAVENOUS AS NEEDED
Status: DISCONTINUED | OUTPATIENT
Start: 2022-05-31 | End: 2022-05-31

## 2022-05-31 RX ORDER — OXYCODONE HYDROCHLORIDE 10 MG/1
10 TABLET ORAL EVERY 4 HOURS PRN
Status: DISCONTINUED | OUTPATIENT
Start: 2022-05-31 | End: 2022-06-01 | Stop reason: HOSPADM

## 2022-05-31 RX ORDER — OXYCODONE HYDROCHLORIDE 5 MG/1
5 TABLET ORAL EVERY 4 HOURS PRN
Status: DISCONTINUED | OUTPATIENT
Start: 2022-05-31 | End: 2022-06-01 | Stop reason: HOSPADM

## 2022-05-31 RX ORDER — IBUPROFEN 600 MG/1
600 TABLET ORAL EVERY 6 HOURS SCHEDULED
Status: DISCONTINUED | OUTPATIENT
Start: 2022-05-31 | End: 2022-06-01 | Stop reason: HOSPADM

## 2022-05-31 RX ORDER — LORAZEPAM 0.5 MG/1
0.5 TABLET ORAL 2 TIMES DAILY
Status: DISCONTINUED | OUTPATIENT
Start: 2022-05-31 | End: 2022-06-01 | Stop reason: HOSPADM

## 2022-05-31 RX ORDER — SODIUM CHLORIDE 9 MG/ML
INJECTION, SOLUTION INTRAVENOUS CONTINUOUS PRN
Status: DISCONTINUED | OUTPATIENT
Start: 2022-05-31 | End: 2022-05-31

## 2022-05-31 RX ORDER — HEPARIN SODIUM 5000 [USP'U]/ML
5000 INJECTION, SOLUTION INTRAVENOUS; SUBCUTANEOUS
Status: COMPLETED | OUTPATIENT
Start: 2022-05-31 | End: 2022-05-31

## 2022-05-31 RX ORDER — FENTANYL CITRATE 50 UG/ML
INJECTION, SOLUTION INTRAMUSCULAR; INTRAVENOUS AS NEEDED
Status: DISCONTINUED | OUTPATIENT
Start: 2022-05-31 | End: 2022-05-31

## 2022-05-31 RX ORDER — ACETAMINOPHEN 325 MG/1
975 TABLET ORAL ONCE
Status: COMPLETED | OUTPATIENT
Start: 2022-05-31 | End: 2022-05-31

## 2022-05-31 RX ORDER — ONDANSETRON 2 MG/ML
INJECTION INTRAMUSCULAR; INTRAVENOUS AS NEEDED
Status: DISCONTINUED | OUTPATIENT
Start: 2022-05-31 | End: 2022-05-31

## 2022-05-31 RX ORDER — ROCURONIUM BROMIDE 10 MG/ML
INJECTION, SOLUTION INTRAVENOUS AS NEEDED
Status: DISCONTINUED | OUTPATIENT
Start: 2022-05-31 | End: 2022-05-31

## 2022-05-31 RX ORDER — MAGNESIUM HYDROXIDE 1200 MG/15ML
LIQUID ORAL AS NEEDED
Status: DISCONTINUED | OUTPATIENT
Start: 2022-05-31 | End: 2022-05-31 | Stop reason: HOSPADM

## 2022-05-31 RX ORDER — FENTANYL CITRATE/PF 50 MCG/ML
25 SYRINGE (ML) INJECTION
Status: COMPLETED | OUTPATIENT
Start: 2022-05-31 | End: 2022-05-31

## 2022-05-31 RX ORDER — MIDAZOLAM HYDROCHLORIDE 2 MG/2ML
INJECTION, SOLUTION INTRAMUSCULAR; INTRAVENOUS AS NEEDED
Status: DISCONTINUED | OUTPATIENT
Start: 2022-05-31 | End: 2022-05-31

## 2022-05-31 RX ORDER — HYDROMORPHONE HCL/PF 1 MG/ML
0.5 SYRINGE (ML) INJECTION EVERY 4 HOURS PRN
Status: DISCONTINUED | OUTPATIENT
Start: 2022-05-31 | End: 2022-06-01 | Stop reason: HOSPADM

## 2022-05-31 RX ORDER — HYDROMORPHONE HCL IN WATER/PF 6 MG/30 ML
0.2 PATIENT CONTROLLED ANALGESIA SYRINGE INTRAVENOUS
Status: DISCONTINUED | OUTPATIENT
Start: 2022-05-31 | End: 2022-05-31 | Stop reason: HOSPADM

## 2022-05-31 RX ORDER — BUPIVACAINE HYDROCHLORIDE 5 MG/ML
INJECTION, SOLUTION EPIDURAL; INTRACAUDAL AS NEEDED
Status: DISCONTINUED | OUTPATIENT
Start: 2022-05-31 | End: 2022-05-31 | Stop reason: HOSPADM

## 2022-05-31 RX ORDER — ONDANSETRON 2 MG/ML
4 INJECTION INTRAMUSCULAR; INTRAVENOUS EVERY 6 HOURS PRN
Status: DISCONTINUED | OUTPATIENT
Start: 2022-05-31 | End: 2022-06-01 | Stop reason: HOSPADM

## 2022-05-31 RX ORDER — GLYCOPYRROLATE 0.2 MG/ML
INJECTION INTRAMUSCULAR; INTRAVENOUS AS NEEDED
Status: DISCONTINUED | OUTPATIENT
Start: 2022-05-31 | End: 2022-05-31

## 2022-05-31 RX ORDER — INSULIN LISPRO 100 [IU]/ML
1-5 INJECTION, SOLUTION INTRAVENOUS; SUBCUTANEOUS
Status: DISCONTINUED | OUTPATIENT
Start: 2022-05-31 | End: 2022-06-01 | Stop reason: HOSPADM

## 2022-05-31 RX ADMIN — MIDAZOLAM HYDROCHLORIDE 2 MG: 1 INJECTION, SOLUTION INTRAMUSCULAR; INTRAVENOUS at 07:30

## 2022-05-31 RX ADMIN — Medication 25 MCG: at 11:24

## 2022-05-31 RX ADMIN — INSULIN HUMAN 10 UNITS: 100 INJECTION, SOLUTION PARENTERAL at 07:12

## 2022-05-31 RX ADMIN — SODIUM CHLORIDE, SODIUM LACTATE, POTASSIUM CHLORIDE, AND CALCIUM CHLORIDE: .6; .31; .03; .02 INJECTION, SOLUTION INTRAVENOUS at 07:29

## 2022-05-31 RX ADMIN — ACETAMINOPHEN 975 MG: 325 TABLET, FILM COATED ORAL at 06:21

## 2022-05-31 RX ADMIN — ACETAMINOPHEN 975 MG: 325 TABLET, FILM COATED ORAL at 21:36

## 2022-05-31 RX ADMIN — NEOSTIGMINE METHYLSULFATE 3 MG: 1 INJECTION INTRAVENOUS at 10:18

## 2022-05-31 RX ADMIN — Medication 25 MCG: at 10:43

## 2022-05-31 RX ADMIN — ROCURONIUM BROMIDE 50 MG: 10 INJECTION INTRAVENOUS at 07:57

## 2022-05-31 RX ADMIN — LORAZEPAM 0.5 MG: 0.5 TABLET ORAL at 17:22

## 2022-05-31 RX ADMIN — INSULIN LISPRO 2 UNITS: 100 INJECTION, SOLUTION INTRAVENOUS; SUBCUTANEOUS at 16:29

## 2022-05-31 RX ADMIN — HEPARIN SODIUM 5000 UNITS: 5000 INJECTION INTRAVENOUS; SUBCUTANEOUS at 07:32

## 2022-05-31 RX ADMIN — Medication 25 MCG: at 10:46

## 2022-05-31 RX ADMIN — Medication 25 MCG: at 10:53

## 2022-05-31 RX ADMIN — ALBUTEROL SULFATE 2.5 MG: 2.5 SOLUTION RESPIRATORY (INHALATION) at 10:41

## 2022-05-31 RX ADMIN — INSULIN HUMAN 6 UNITS: 100 INJECTION, SOLUTION PARENTERAL at 10:40

## 2022-05-31 RX ADMIN — CEFAZOLIN SODIUM 2000 MG: 2 SOLUTION INTRAVENOUS at 07:59

## 2022-05-31 RX ADMIN — Medication 25 MCG: at 11:36

## 2022-05-31 RX ADMIN — Medication 100 MCG: at 07:52

## 2022-05-31 RX ADMIN — LIDOCAINE HYDROCHLORIDE 100 MG: 20 INJECTION, SOLUTION EPIDURAL; INFILTRATION; INTRACAUDAL at 07:45

## 2022-05-31 RX ADMIN — GABAPENTIN 300 MG: 300 CAPSULE ORAL at 16:28

## 2022-05-31 RX ADMIN — ALBUMIN (HUMAN): 12.5 INJECTION, SOLUTION INTRAVENOUS at 09:32

## 2022-05-31 RX ADMIN — INSULIN GLARGINE 30 UNITS: 100 INJECTION, SOLUTION SUBCUTANEOUS at 21:36

## 2022-05-31 RX ADMIN — SODIUM CHLORIDE, SODIUM LACTATE, POTASSIUM CHLORIDE, AND CALCIUM CHLORIDE 125 ML/HR: .6; .31; .03; .02 INJECTION, SOLUTION INTRAVENOUS at 10:59

## 2022-05-31 RX ADMIN — HYDROMORPHONE HYDROCHLORIDE 0.25 MG: 1 INJECTION, SOLUTION INTRAMUSCULAR; INTRAVENOUS; SUBCUTANEOUS at 10:01

## 2022-05-31 RX ADMIN — IBUPROFEN 600 MG: 600 TABLET ORAL at 21:36

## 2022-05-31 RX ADMIN — IBUPROFEN 600 MG: 600 TABLET ORAL at 14:00

## 2022-05-31 RX ADMIN — METOCLOPRAMIDE HYDROCHLORIDE 10 MG: 5 INJECTION INTRAMUSCULAR; INTRAVENOUS at 08:41

## 2022-05-31 RX ADMIN — GABAPENTIN 300 MG: 300 CAPSULE ORAL at 21:36

## 2022-05-31 RX ADMIN — SODIUM CHLORIDE: 0.9 INJECTION, SOLUTION INTRAVENOUS at 07:55

## 2022-05-31 RX ADMIN — PROPOFOL 200 MG: 10 INJECTION, EMULSION INTRAVENOUS at 07:45

## 2022-05-31 RX ADMIN — SERTRALINE 25 MG: 25 TABLET, FILM COATED ORAL at 13:59

## 2022-05-31 RX ADMIN — FENTANYL CITRATE 50 MCG: 50 INJECTION, SOLUTION INTRAMUSCULAR; INTRAVENOUS at 08:21

## 2022-05-31 RX ADMIN — HYDROMORPHONE HYDROCHLORIDE 0.25 MG: 1 INJECTION, SOLUTION INTRAMUSCULAR; INTRAVENOUS; SUBCUTANEOUS at 10:07

## 2022-05-31 RX ADMIN — Medication 25 MCG: at 10:49

## 2022-05-31 RX ADMIN — Medication 25 MCG: at 11:27

## 2022-05-31 RX ADMIN — FENTANYL CITRATE 50 MCG: 50 INJECTION, SOLUTION INTRAMUSCULAR; INTRAVENOUS at 07:45

## 2022-05-31 RX ADMIN — ACETAMINOPHEN 975 MG: 325 TABLET, FILM COATED ORAL at 13:59

## 2022-05-31 RX ADMIN — GLYCOPYRROLATE 0.6 MG: 0.2 INJECTION INTRAMUSCULAR; INTRAVENOUS at 10:18

## 2022-05-31 RX ADMIN — ONDANSETRON 4 MG: 2 INJECTION INTRAMUSCULAR; INTRAVENOUS at 08:41

## 2022-05-31 RX ADMIN — Medication 25 MCG: at 11:21

## 2022-05-31 RX ADMIN — Medication 10 MG: at 07:53

## 2022-05-31 RX ADMIN — SODIUM CHLORIDE, SODIUM LACTATE, POTASSIUM CHLORIDE, AND CALCIUM CHLORIDE: .6; .31; .03; .02 INJECTION, SOLUTION INTRAVENOUS at 08:48

## 2022-05-31 NOTE — ASSESSMENT & PLAN NOTE
To OR for RA-TLH, unilateraly salpingo-oophorectomy, lymph node dissection  Risks and benefits of surgical management reviewed with patient     68-year-old with stage IV B high-grade endometrial cancer, liver, retroperitoneal lymph node, lung metastatic disease who has received 3 cycles of carboplatin and Taxol chemotherapy  Treatment course has been complicated by Taxol related arthralgias/neuropathy  I reviewed her CT of the chest abdomen pelvis, CBC, CMP  Her performance status is 1     1  I reviewed the CT findings in detail  She has stable disease  2  We discussed the risks and benefits of performing robotic assisted total laparoscopic hysterectomy and bilateral salpingo-oophorectomy and length  She understands that this would be largely a palliative procedure although there is some data suggesting benefit to removing the primary lesion  Although she had previously signed consent for a robotic assisted procedure, I reviewed the risks and benefits of the surgery  She understands the risks and benefits of the operation agrees to proceed as outlined  3  Will plan for cycle 4  Of carboplatin and Taxol and schedule surgery approximately 3 weeks post cycle 4 to allow her counts to recover adequately  4  Postoperatively, will add bevacizumab to carboplatin and Taxol

## 2022-05-31 NOTE — OP NOTE
OPERATIVE REPORT  PATIENT NAME: Debbie Stokes    :  1955  MRN: [id number]  Pt Location: [facility] OR ROOM 14    SURGERY DATE: 2022    Surgeon(s) and Role:     * Aniyah Haynes MD - Primary     * Reina Lambert PA-C - Assisting     * Danita Morales MD - Assisting     * Margaret Borjas MD - Assisting    Preop Diagnosis:  Endometrial cancer [facility]) [C54 1]    Post-Op Diagnosis Codes:     * [facility] [C54 1]    Procedure(s) (LRB):  ROBOTIC ASSISTED TOTAL LAPAROSCOPIC HYSTERECTOMY, RIGHT SALPINGO-OOPHORECTOMY, LEFT SALPINGECTOMY, (N/A)  CYSTOSCOPY (N/A)  LYSIS ADHESIONS LAPAROSCOPIC W ROBOT (N/A)    Specimen(s):  ID Type Source Tests Collected by Time Destination   1 :  Tissue Fallopian Tube, Left TISSUE EXAM Aniyah Haynes MD 2022 [id number]    2 : RIGHT TUBE AND OVARY Tissue Fallopian Tube, Right TISSUE EXAM Aniyah Haynes MD 2022 [id number]        Estimated Blood Loss:   Minimal    Drains:  NG/OG/Enteral Tube Orogastric 18 Fr Center mouth (Active)   Number of days: 0       Urethral Catheter Non-latex 16 Fr  (Active)   Number of days: 0       Anesthesia Type:   General    Operative Indications:  Endometrial cancer ([facility] ) [C471]  48-year-old with stage IV B endometrial cancer, liver, lung, retroperitoneal lymph node metastatic disease with residual 6 cm tumor in the uterus, ongoing vaginal bleeding presented for palliative hysterectomy  She is morbidly obese with BMI of 41 kilograms/meter squared and had prior open cholecystectomy as well as prior laparotomy to remove an ovarian mass  Operative Findings:  1  Exam under anesthesia revealed a firm cervix  There was no evidence of parametrial disease  The uterus was mobile  2  On laparoscopy, there were dense adhesions from the omentum to the anterior abdominal wall in the right upper quadrant  These were not in the operative field  There is no evidence of visible peritoneal disease    In the pelvis, the endometrial cancer involve the serosa of the uterus  There were dense adhesions present from the sigmoid colon mesentery to the left adnexa  The left fallopian tube remained  The right adnexa was adherent to the pelvic sidewall  There was evidence of old endometriosis present  The sigmoid mesentery is also densely adherent to the left parametria  Due to her morbid obesity, it was difficult to obtain exposure  Therefore, a fan retractor and a 6th laparoscopic port site were utilized to have 2 assistant ports  Despite this, visualization was suboptimal   Given the extensive adhesive disease, infiltration of the malignancy to the level of the parametria and cervix, the decision was made to abort the palliative hysterectomy  Therefore, a right salpingo-oophorectomy and left salpingectomy were performed  3  Inherent vaginal laceration from placement of the uterine manipulator  Hemostasis was controlled using pressure and a vaginal packing that was left at the end of the surgery  4  Cystoscopy with bilateral jets and no evidence of bladder injury  Complications:   None    Procedure and Technique:  After informed consent was obtained, the patient was taken to the operating room where general endotracheal anesthesia was administered without incident  She was then prepped and draped in normal sterile fashion in the low dorsal lithotomy position  Examination under anesthesia was then performed with findings noted as above  A speculum was placed in patient's vagina  The anterior lip the cervix was grasped with single-tooth tenaculum  The uterus sounded to 6 cm  A 0 Vicryl stay suture was placed on the cervix and used to secure the PRAKASH uterine manipulator and koh cup  A Parks catheter was inserted  Attention was then turned to the abdomen  0 5% Marcaine was used to infiltrate the skin prior to placement of any trocar  The 1st insertion site was approximately 4 cm superior to the umbilicus in the midline    An 8 mm skin incision was made using 11 blade scalpel  Through this was passed an 8 mm robotic trocar under direct visualization into the abdominal cavity  The abdomen is then insufflated to 15 mmHg using CO2 gas  Given difficulty with ventilation, abdominal pressure was reduced to 12 mmHg for the remainder of the case  Additional robotic trocars then placed under direct visualization  There were 2 placed on the left side of the abdomen, 1 on the right side of the abdomen  A 5 mm assistant port was then placed in the right upper quadrant  The robot was docked  It became clear that a fan retractor would be necessary to perform the procedure  Therefore, the 5 mm assistant port was removed and a 12 mm assistant port inserted to allow use of the fan retractor  The sigmoid colon was released from adhesions the left adnexa  The sigmoid mesentery was released from adhesions to the left pelvic sidewall  Due to extensive retroperitoneal adipose tissue, the left ureter was not directly visualized  There was a small amount of residual infundibulopelvic ligament that was present that was cauterized and transected  A portion of the vesicovaginal space was opened on the left side  There were some adhesions noted there as well  On the right side, the retroperitoneal space was opened  The ureter was able to be identified transperitoneally  The round ligament was cauterized and transected  The infundibulopelvic ligament was then skeletonized, cauterized and transected  Additional adhesiolysis was then necessary left side  The sigmoid mesentery was densely adherent to the pelvic sidewall and there became limited mobility as well as significant edema and parametria  The vesicovaginal space was able to be developed  It was clear during the anterior dissection that the uterine manipulator was malpositioned and was therefore removed  An EEA Sizer was placed    The uterus still had some limited mobility due to extensive adhesive disease mainly on left side  Based on the limited visualization, tumor involvement of the left parametria as well as dense adhesions, seizure was made to abort the hysterectomy given the palliative nature of the procedure  The right utero-ovarian ligament was cauterized and transected with the vessel sealer  The right ovary was placed in an Endo-Catch bag  The left fallopian tube was then removed and taken out through the 12 mm port  The pelvis was irrigated  Surgiflo was placed in the left hemipelvis  The pressure in the pelvis was brought down to 5 mmHg  There is no evidence of active bleeding identified  The pressure was increased  The robot was undocked  The right tube and ovary  were then removed laparoscopically  The trocars then removed under direct visualization  Gas was removed from the abdominal cavity  The incision sites were closed using 4-0 Monocryl followed by Exofin  The Parks catheter was removed  The bladder was insufflated with 200 cc of normal saline  A 5 mm laparoscope was then used as a cystoscope with findings noted as above  The Parks catheter was then replaced  Inspection of the vagina revealed a small defect in the anterior vaginal wall inherent to placement of the uterine manipulator  Given the patient's morbid obesity and limited exposure, the area was packed which controlled hemostasis well  Gauze packing was removed and a vaginal pack was then placed  The patient was then awakened and transferred to the recovery room in stable condition  All instrument counts correct x2 for the procedure  No complications  Estimated blood loss is 100 mL      I was present for the entire procedure and A physician assistant was required during the procedure for retraction tissue handling,dissection and suturing    Patient Disposition:  PACU       SIGNATURE: Claudeen Gust, MD  DATE: May 31, 2022  TIME: 10:14 AM

## 2022-05-31 NOTE — CONSULTS
Consultation - Tania Zepeda 78 y o  female MRN: [id number]    Unit/Bed#: OR [place] Encounter: [id number]    Impression:  Endometrial cancer status post hysterectomy  Diabetes type 2 on long-term insulin use with no complications      Assessment: This is a 78y o -year-old female with past medical history of diabetes type 2 on long-term insulin use with no complications so was admitted to the hospital for an elective hysterectomy due to endometrial cancer endocrinology consulted for diabetes type 2 management    Plan:  Currently glucose levels around 200s to 300s patient received 2 doses of regular insulin  Recommend to start 30 units of Lantus at bedtime  Patient reports poor appetite recommend to continue with correctional scale once appetite gets better will start mealtime insulin  Continue with Accu-Cheks  Optimal glucose range 140s to 180s    CC: Diabetes Consult      HPI: Tania Zepeda is a 78y o  year old female with type 2 diabetes on long-term insulin with no complications and history of endometrial cancer who was admitted to the hospital for elective hysterectomy patient reports that was diagnosed with diabetes type 2 long time ago and has been compliant with her home regimen patient denies any history of complications such as stroke MI and CKD neuropathy retinopathy patient states that she checks her glucose at home and usually range around 150 currently patient denies any symptoms associated with diabetes such as polydipsia polyuria polyphagia    Home regimen metformin 1000 mg BD, Januvia 100 mg daily, Toujeo 34 units in the morning and 36 units at night  A1c 8 3      Review of Systems   Constitutional: Negative for activity change and appetite change  HENT: Negative for congestion and facial swelling  Eyes: Negative for photophobia and discharge  Respiratory: Negative for apnea, shortness of breath and wheezing  Cardiovascular: Negative for chest pain and palpitations     Gastrointestinal: Negative for abdominal distention and abdominal pain  Endocrine: Negative for cold intolerance, heat intolerance and polydipsia  Musculoskeletal: Negative for arthralgias and back pain  Skin: Negative for color change and wound  Neurological: Negative for tremors, weakness and headaches  Psychiatric/Behavioral: Negative for agitation, behavioral problems and confusion  Historical Information   Past Medical History:   Diagnosis Date    Anxiety     Arthritis     Back pain     [place] ([place] [address] )     Depression     Diabetes mellitus ([place] [address] )     Disease of thyroid gland     Endometrial cancer (HCC)     GERD (gastroesophageal reflux disease)     Hyperlipidemia associated with type 2 diabetes mellitus ([place] [address] )     Hypertension     Morbid obesity with BMI of 40 0-44 9, adult ([place] [address] )     Type 2 diabetes mellitus without complication ([place] [address] )     Urinary incontinence     Wears glasses      Past Surgical History:   Procedure Laterality Date    CATARACT EXTRACTION W/ INTRAOCULAR LENS IMPLANT Bilateral     CHOLECYSTECTOMY OPEN      IR PORT PLACEMENT  03/07/2022    SALPINGOOPHORECTOMY N/A     only had one removed and not sure which    TONSILLECTOMY       Social History   Social History     Substance and Sexual Activity   Alcohol Use Never     Social History     Substance and Sexual Activity   Drug Use Never     Social History     Tobacco Use   Smoking Status Never Smoker   Smokeless Tobacco Never Used     Family History: History reviewed  No pertinent family history      Meds/Allergies   Current Facility-Administered Medications   Medication Dose Route Frequency Provider Last Rate Last Admin    diphenhydrAMINE (BENADRYL) injection 12 5 mg  12 5 mg Intravenous Once PRN Alison Jordan MD        HYDROmorphone HCl (DILAUDID) injection 0 2 mg  0 2 mg Intravenous Q5 Min PRN Alison Jordan MD        lactated ringers infusion  125 mL/hr Intravenous Continuous Alison Jordan  mL/hr at 05/31/22 1059 125 mL/hr at 05/31/22 1059    ondansetron (ZOFRAN) injection 4 mg  4 mg Intravenous Once PRN Ilir Huizar MD         Allergies   Allergen Reactions    Empagliflozin Other (See Comments)     Yeast infection    Exenatide Nausea Only    Glipizide Other (See Comments)     hypoglycemic    Repaglinide Nausea Only       Objective   Vitals: Blood pressure 126/58, pulse 96, temperature 98 9 °F (37 2 °C), temperature source Temporal, resp  rate 18, height 5' 3" (1 6 m), weight 105 kg (232 lb), SpO2 98 %  Intake/Output Summary (Last 24 hours) at 5/31/2022 1234  Last data filed at 5/31/2022 1038  Gross per 24 hour   Intake 3250 ml   Output 50 ml   Net 3200 ml     Invasive Devices  Report    Central Venous Catheter Line  Duration           Port A Cath 03/07/22 Right Subclavian 84 days          Peripheral Intravenous Line  Duration           Peripheral IV 05/31/22 Left Hand <1 day    Peripheral IV 05/31/22 Left Wrist <1 day    Peripheral IV 05/31/22 Right Hand <1 day          Drain  Duration           NG/OG/Enteral Tube Orogastric 18 Fr Center mouth <1 day    Urethral Catheter Non-latex 16 Fr  <1 day                Physical Exam  Constitutional:       Appearance: Normal appearance  HENT:      Nose: No congestion or rhinorrhea  Mouth/Throat:      Pharynx: No oropharyngeal exudate or posterior oropharyngeal erythema  Eyes:      Conjunctiva/sclera: Conjunctivae normal       Pupils: Pupils are equal, round, and reactive to light  Cardiovascular:      Rate and Rhythm: Normal rate and regular rhythm  Pulses: Normal pulses  Heart sounds: No murmur heard  No friction rub  Pulmonary:      Effort: No respiratory distress  Breath sounds: No stridor  No wheezing  Abdominal:      General: There is no distension  Palpations: Abdomen is soft  Tenderness: There is no abdominal tenderness  Musculoskeletal:         General: No swelling  Cervical back: No rigidity or tenderness  Skin:     Coloration: Skin is not jaundiced  Findings: No bruising  Neurological:      General: No focal deficit present  Mental Status: She is alert and oriented to person, place, and time  Motor: No weakness  Psychiatric:         Mood and Affect: Mood normal          Thought Content: Thought content normal          Judgment: Judgment normal              Lab Results:       Lab Results   Component Value Date    WBC 6 45 05/31/2022    HGB 8 0 (L) 05/31/2022    HCT 26 4 (L) 05/31/2022    MCV 90 05/31/2022     05/31/2022     Lab Results   Component Value Date/Time    BUN 19 05/23/2022 10:38 AM    K 4 1 05/23/2022 10:38 AM    CL 99 05/23/2022 10:38 AM    CO2 24 05/23/2022 10:38 AM    CREATININE 0 88 05/23/2022 10:38 AM    AST 15 05/23/2022 10:38 AM    ALT 16 05/23/2022 10:38 AM    ALB 3 9 05/23/2022 10:38 AM    GLOB 1 9 05/23/2022 10:38 AM     No results for input(s): CHOL, HDL, LDL, TRIG, VLDL in the last 72 hours  No results found for: Antonio Huang  POC Glucose (mg/dl)   Date Value   05/31/2022 256 (H)   05/31/2022 245 (H)   05/31/2022 402 (H)   03/07/2022 223 (H)         Portions of the record may have been created with voice recognition software

## 2022-05-31 NOTE — ANESTHESIA POSTPROCEDURE EVALUATION
Post-Op Assessment Note    CV Status:  Stable    Pain management: adequate     Mental Status:  Alert and awake   Hydration Status:  Euvolemic   PONV Controlled:  Controlled   Airway Patency:  Patent  Airway: intubated      Post Op Vitals Reviewed: Yes      Staff: Anesthesiologist         No complications documented      BP   159/65   Temp   97   Pulse  95   Resp   21   SpO2   99

## 2022-05-31 NOTE — DISCHARGE INSTR - AVS FIRST PAGE
[facility]  Drs Diannia Bence, Graul, and Jamia  [phone]    Hysterectomy Discharge Instructions    WHAT YOU NEED TO KNOW:   A hysterectomy is surgery to remove your uterus  Your ovaries, fallopian tubes, cervix, or part of your vagina may also need to be removed  The organs and tissue that will be removed depends on your medical condition  After a hysterectomy, you will not be able to become pregnant  If your ovaries are removed, you will go through menopause if you have not already  DISCHARGE INSTRUCTIONS:   Contact your doctor at the number above if:   You have a fever over 101o  You have nausea or are vomiting that does not improve after a light meal    Your pain is getting worse, even after you take medicine  You feel pain or burning when you urinate, or you have trouble urinating  You have pus or a foul-smelling odor coming from your vagina  Your wound is red, swollen, or draining pus  You see new or an increased amount of bright red blood coming from your vagina or your incisions  You have questions or concerns about your condition or care  Seek care immediately:   Your arm or leg feels warm, tender, and painful  It may look swollen and red  You have increasing abdominal or pelvic pain  You have heavy vaginal bleeding that fills 1 or more sanitary pads in 1 hour  Call 911 for any of the following: You feel lightheaded, short of breath, and have chest pain  You cough up blood  Medicines: You may need any of the following:  Prescription medicine may be given  You may receive a prescription for pain medication or be advised to use over the counter (OTC) pain medication such as acetaminophen (Tylenol) or ibuprofen (Advil, Motrin)  Ask your healthcare provider how to take this medicine safely  NSAIDs , such as ibuprofen, help decrease swelling, pain, and fever   NSAIDs can cause stomach bleeding or kidney problems in certain people  If you take blood thinner medicine, always ask your healthcare provider if NSAIDs are safe for you  Always read the medicine label and follow directions  Stool softeners help treat or prevent constipation  Take your medicine as directed  Contact your healthcare provider if you think your medicine is not helping or if you have side effects  Tell him or her if you are allergic to any medicine  Keep a list of the medicines, vitamins, and herbs you take  Include the amounts, and when and why you take them  Bring the list or the pill bottles to follow-up visits  Carry your medicine list with you in case of an emergency  Activity:   Rest as needed  Get up and move around as directed to help prevent blood clots  Start with short walks and slowly increase the distance every day  Limit the number of times you climb stairs to 2 times each day for the first week  Plan most of your daily activities on one level of your home  Do not lift objects heavier than 10 pounds for 6 weeks  Avoid strenuous activity for 2 weeks  Do not strain during bowel movements  High-fiber foods and extra liquids can help you prevent constipation  Examples of high-fiber foods are fruit and bran  Prune juice and water are good liquids to drink  Do not have sex, use tampons, or douche for up to 8 weeks  You may shower as soon as the day after surgery  Tub baths can be taken starting 2 weeks after surgery  Do not go into pools or hot tubs until cleared by your doctor  Ask when it is safe for you to drive  It is generally safe to drive after 2 weeks and when no longer taking prescription pain medication  Ask when you may return to work and to other regular activities  Wound care: Care for your abdominal incisions as directed  Carefully wash around the wound with soap and water   If you have Hibiclens or medicated soap that you were instructed to use before surgery, you may use that to wash with for up to 2 days after surgery  If not, any mild non-scented, non-abrasive soap is safe  It is okay to let the soap and water run over your incision  Do not scrub your incision  Dry the area and put on new, clean bandages as directed  Change your bandages when they get wet or dirty  If you have strips of medical tape, let them fall off on their own  It may take 7 to 14 days for them to fall off  Check your incision every day for redness, swelling, or pus  Deep breathing: Take deep breaths and cough 10 times each hour  This will decrease your risk for a lung infection  Take a deep breath and hold it for as long as you can  Let the air out and then cough strongly  Deep breaths help open your airway  You may be given an incentive spirometer to help you take deep breaths  Put the plastic piece in your mouth and take a slow, deep breath, then let the air out and cough  Repeat these steps 10 times every hour  Get support: This surgery may be life-changing for you and your family  You will no longer be able to get pregnant  Sudden changes in the levels of your hormones may occur and cause mood swings and depression  You may feel angry, sad, or frightened, or cry frequently and unexpectedly  These feelings are normal  Talk to your healthcare provider about where you can get support  You can also ask if hormone replacement medicine is right for you  Follow up with your healthcare provider or gynecologist as directed: You may need to return to have stitches removed, and for other tests  Write down your questions so you remember to ask them during your visits

## 2022-05-31 NOTE — QUICK NOTE
Mt Gaming  [id number]  5/31/2022  4:35 PM    POST-OP CHECK     S:  Mt Gaming doing well  Pain controlled  Denies nausea/vomiting  Denies chest pain, shortness of breath  No complaints at this time  Just reports discomfort from sitting for so long  O:  Vitals:    05/31/22 1442   BP: 119/54   Pulse: 93   Resp: 16   Temp: 98 °F (36 7 °C)   SpO2: 98%       Physical Exam  Constitutional:       General: She is not in acute distress  Appearance: She is obese  She is not toxic-appearing  HENT:      Head: Normocephalic and atraumatic  Cardiovascular:      Rate and Rhythm: Normal rate  Pulses: Normal pulses  Pulmonary:      Effort: Pulmonary effort is normal  No respiratory distress  Abdominal:      General: There is no distension  Palpations: Abdomen is soft  Tenderness: There is no abdominal tenderness  There is no guarding or rebound  Comments: 6 incisions c/d/i, no erythema or drainage, closed with glue   Musculoskeletal:      Cervical back: Normal range of motion  Right lower leg: No edema  Left lower leg: No edema  Skin:     General: Skin is warm and dry  Neurological:      General: No focal deficit present  Mental Status: She is alert  Psychiatric:         Mood and Affect: Mood normal          Behavior: Behavior normal            A:  Mt Gaming is s/p diagnostic laparoscopy, right salpingo-oophorectomy, left salpingectomy, lysis of adhesions, and placement of vaginal packing due to inherent vaginal laceration, recovering well       P:  Routine postop check  IV/PO pain meds as needed  Regular diet as tolerated  Antiemetics for nausea/vomiting prn    Anemia:   Lab Results   Component Value Date    HGB 7 5 (L) 05/31/2022     Continue to trend H/H    Vaginal packing  Maintain in place, will remove in the morning  Parks to remain in place with packing    Diabetes  Appreciate endocrine consultation and sliding scale    Adequate UOP, continue to monitor  SCDs for DVT ppx, encourage ambulation as tolerated      Tello Marquez MD  OB/GYN  5/31/2022  4:35 PM

## 2022-05-31 NOTE — ANESTHESIA PREPROCEDURE EVALUATION
Procedure:  ROBOTIC ASSISTED TOTAL LAPAROSCOPIC HYSTERECTOMY, UNILATERAL SALPINGO-OOPHORECTOMY, LYMPH NODE DISSECTION (N/A Abdomen)  POSSIBLE EXPLORATORY LAPAROTOMY (N/A Abdomen)    Relevant Problems   CARDIO   (+) Hyperlipidemia associated with type 2 diabetes mellitus (HCC)      ENDO   (+) Type 2 diabetes mellitus without complication (HCC)      GYN   (+) Endometrial cancer (HCC)   Sinus rhythm  Incomplete right bundle branch block  Nonspecific ST abnormality    Morbid obesity BMI 41   , took 1/2 dose insulin last night and drank the carb drink, given 10U SC regular insulin    Physical Exam    Airway    Mallampati score: III  TM Distance: >3 FB  Neck ROM: full     Dental   No notable dental hx     Cardiovascular  Cardiovascular exam normal    Pulmonary  Pulmonary exam normal     Other Findings       Latest Reference Range & Units 05/23/22 10:38   Sodium 134 - 144 mmol/L 138   Potassium 3 5 - 5 2 mmol/L 4 1   Chloride 96 - 106 mmol/L 99   CO2 20 - 29 mmol/L 24   BUN 8 - 27 mg/dL 19   Creatinine 0 57 - 1 00 mg/dL 0 88   SL AMB BUN/CREATININE RATIO 12 - 28  22   Glucose, Random 65 - 99 mg/dL 160 (H)       Anesthesia Plan  ASA Score- 3     Anesthesia Type- general with ASA Monitors  Additional Monitors:   Airway Plan: ETT  Plan Factors-Exercise tolerance (METS): >4 METS  Chart reviewed  EKG reviewed  Imaging results reviewed  Existing labs reviewed  Patient summary reviewed  Patient is not a current smoker  Induction- intravenous  Postoperative Plan- Plan for postoperative opioid use  Planned trial extubation    Informed Consent- Anesthetic plan and risks discussed with patient  I personally reviewed this patient with the CRNA  Discussed and agreed on the Anesthesia Plan with the CRNA  Valencia Adams

## 2022-06-01 VITALS
HEART RATE: 94 BPM | WEIGHT: 232 LBS | DIASTOLIC BLOOD PRESSURE: 56 MMHG | BODY MASS INDEX: 41.11 KG/M2 | RESPIRATION RATE: 16 BRPM | SYSTOLIC BLOOD PRESSURE: 125 MMHG | HEIGHT: 63 IN | TEMPERATURE: 98.5 F | OXYGEN SATURATION: 93 %

## 2022-06-01 LAB
ANION GAP SERPL CALCULATED.3IONS-SCNC: 4 MMOL/L (ref 4–13)
BASOPHILS # BLD AUTO: 0.01 THOUSANDS/ΜL (ref 0–0.1)
BASOPHILS NFR BLD AUTO: 0 % (ref 0–1)
BUN SERPL-MCNC: 8 MG/DL (ref 5–25)
CALCIUM SERPL-MCNC: 8.5 MG/DL (ref 8.3–10.1)
CHLORIDE SERPL-SCNC: 109 MMOL/L (ref 100–108)
CO2 SERPL-SCNC: 28 MMOL/L (ref 21–32)
CREAT SERPL-MCNC: 0.72 MG/DL (ref 0.6–1.3)
EOSINOPHIL # BLD AUTO: 0.07 THOUSAND/ΜL (ref 0–0.61)
EOSINOPHIL NFR BLD AUTO: 1 % (ref 0–6)
ERYTHROCYTE [DISTWIDTH] IN BLOOD BY AUTOMATED COUNT: 22 % (ref 11.6–15.1)
GFR SERPL CREATININE-BSD FRML MDRD: 86 ML/MIN/1.73SQ M
GLUCOSE SERPL-MCNC: 180 MG/DL (ref 65–140)
GLUCOSE SERPL-MCNC: 186 MG/DL (ref 65–140)
GLUCOSE SERPL-MCNC: 322 MG/DL (ref 65–140)
HCT VFR BLD AUTO: 25.1 % (ref 34.8–46.1)
HGB BLD-MCNC: 7.5 G/DL (ref 11.5–15.4)
IMM GRANULOCYTES # BLD AUTO: 0.04 THOUSAND/UL (ref 0–0.2)
IMM GRANULOCYTES NFR BLD AUTO: 1 % (ref 0–2)
LYMPHOCYTES # BLD AUTO: 0.86 THOUSANDS/ΜL (ref 0.6–4.47)
LYMPHOCYTES NFR BLD AUTO: 14 % (ref 14–44)
MCH RBC QN AUTO: 27.8 PG (ref 26.8–34.3)
MCHC RBC AUTO-ENTMCNC: 29.9 G/DL (ref 31.4–37.4)
MCV RBC AUTO: 93 FL (ref 82–98)
MONOCYTES # BLD AUTO: 0.87 THOUSAND/ΜL (ref 0.17–1.22)
MONOCYTES NFR BLD AUTO: 14 % (ref 4–12)
NEUTROPHILS # BLD AUTO: 4.37 THOUSANDS/ΜL (ref 1.85–7.62)
NEUTS SEG NFR BLD AUTO: 70 % (ref 43–75)
NRBC BLD AUTO-RTO: 0 /100 WBCS
PLATELET # BLD AUTO: 191 THOUSANDS/UL (ref 149–390)
PMV BLD AUTO: 8.9 FL (ref 8.9–12.7)
POTASSIUM SERPL-SCNC: 3.9 MMOL/L (ref 3.5–5.3)
RBC # BLD AUTO: 2.7 MILLION/UL (ref 3.81–5.12)
SODIUM SERPL-SCNC: 141 MMOL/L (ref 136–145)
WBC # BLD AUTO: 6.22 THOUSAND/UL (ref 4.31–10.16)

## 2022-06-01 PROCEDURE — 99024 POSTOP FOLLOW-UP VISIT: CPT | Performed by: OBSTETRICS & GYNECOLOGY

## 2022-06-01 PROCEDURE — 80048 BASIC METABOLIC PNL TOTAL CA: CPT | Performed by: OBSTETRICS & GYNECOLOGY

## 2022-06-01 PROCEDURE — 82948 REAGENT STRIP/BLOOD GLUCOSE: CPT

## 2022-06-01 PROCEDURE — 85025 COMPLETE CBC W/AUTO DIFF WBC: CPT | Performed by: OBSTETRICS & GYNECOLOGY

## 2022-06-01 RX ADMIN — IBUPROFEN 600 MG: 600 TABLET ORAL at 11:00

## 2022-06-01 RX ADMIN — GABAPENTIN 300 MG: 300 CAPSULE ORAL at 08:31

## 2022-06-01 RX ADMIN — SERTRALINE 25 MG: 25 TABLET, FILM COATED ORAL at 08:31

## 2022-06-01 RX ADMIN — INSULIN LISPRO 3 UNITS: 100 INJECTION, SOLUTION INTRAVENOUS; SUBCUTANEOUS at 10:52

## 2022-06-01 RX ADMIN — ACETAMINOPHEN 975 MG: 325 TABLET, FILM COATED ORAL at 13:14

## 2022-06-01 RX ADMIN — INSULIN LISPRO 1 UNITS: 100 INJECTION, SOLUTION INTRAVENOUS; SUBCUTANEOUS at 08:33

## 2022-06-01 RX ADMIN — SITAGLIPTIN 100 MG: 100 TABLET, FILM COATED ORAL at 08:32

## 2022-06-01 RX ADMIN — PANTOPRAZOLE SODIUM 20 MG: 20 TABLET, DELAYED RELEASE ORAL at 05:41

## 2022-06-01 RX ADMIN — LORAZEPAM 0.5 MG: 0.5 TABLET ORAL at 08:31

## 2022-06-01 RX ADMIN — IBUPROFEN 600 MG: 600 TABLET ORAL at 05:41

## 2022-06-01 RX ADMIN — ACETAMINOPHEN 975 MG: 325 TABLET, FILM COATED ORAL at 05:41

## 2022-06-01 NOTE — CASE MANAGEMENT
Case Management Discharge Planning Note    Patient name Jenniffer Hicks  Location [address] 316-33 MRN [id number]  : 1955 Date 2022       Current Admission Date: 2022  Current Admission Diagnosis:Endometrial cancer [facility])   Patient Active Problem List    Diagnosis Date Noted    Endometrial cancer ([place] [address] ) 2022    Hyperlipidemia associated with type 2 diabetes mellitus ([place] [address] ) 2022    Type 2 diabetes mellitus without complication ([place] [address] )     Morbid obesity with BMI of 40 0-44 9, adult ([place] [address] ) 2022      LOS (days): 0  Geometric Mean LOS (GMLOS) (days):   Days to GMLOS:     OBJECTIVE:            Current admission status: Outpatient Surgery   Preferred Pharmacy:   [facility], [address] [place] [address]  Phone: [phone] Fax: [phone]    Primary Care Provider: Jessica Gilbert DO    Primary Insurance: BLUE CROSS  Secondary Insurance:     DISCHARGE DETAILS:          Additional Comments: patient is currently written for discharge and CM has not been notified of any needs for time of discharge   Patient currently listed as "outpatient surgery"

## 2022-06-01 NOTE — PROGRESS NOTES
Pastoral Care Progress Note    2022  Patient: Quinn Goode : 1955  Admission Date & Time: 2022 0502  MRN: [id number] [facility]: [id number]                     Chaplaincy Interventions Utilized:   Empowerment: Encouraged self-care    Exploration: Explored emotional needs & resources, Explored relational needs & resources, and Explored spiritual needs & resources    Collaboration: Facilitated respect for spiritual/cultural practice during hospitalization    Relationship Building: Cultivated a relationship of care and support and Listened empathically    Chaplaincy Outcomes Achieved:  Emotional resources utilized, Expressed peace, Identified meaningful connections, Improved communication, and Verbally processed emotions    Spiritual Coping Strategies Utilized:   No spiritual coping       22 0900   Clinical Encounter Type   Visited With Patient   Routine Visit Introduction   Referral From [place]

## 2022-06-01 NOTE — PLAN OF CARE
Problem: MOBILITY - ADULT  Goal: Maintain or return to baseline ADL function  Description: INTERVENTIONS:  -  Assess patient's ability to carry out ADLs; assess patient's baseline for ADL function and identify physical deficits which impact ability to perform ADLs (bathing, care of mouth/teeth, toileting, grooming, dressing, etc )  - Assess/evaluate cause of self-care deficits   - Assess range of motion  - Assess patient's mobility; develop plan if impaired  - Assess patient's need for assistive devices and provide as appropriate  - Encourage maximum independence but intervene and supervise when necessary  - Involve family in performance of ADLs  - Assess for home care needs following discharge   - Consider OT consult to assist with ADL evaluation and planning for discharge  - Provide patient education as appropriate  Outcome: Progressing  Goal: Maintains/Returns to pre admission functional level  Description: INTERVENTIONS:  - Perform BMAT or MOVE assessment daily    - Set and communicate daily mobility goal to care team and patient/family/caregiver  - Collaborate with rehabilitation services on mobility goals if consulted  - Perform Range of Motion 3 times a day  - Reposition patient every 3 hours    - Dangle patient 3 times a day  - Stand patient 3 times a day  - Ambulate patient 3 times a day  - Out of bed to chair 3 times a day   - Out of bed for meals 3 times a day  - Out of bed for toileting  - Record patient progress and toleration of activity level   Outcome: Progressing     Problem: Potential for Falls  Goal: Patient will remain free of falls  Description: INTERVENTIONS:  - Educate patient/family on patient safety including physical limitations  - Instruct patient to call for assistance with activity   - Consult OT/PT to assist with strengthening/mobility   - Keep Call bell within reach  - Keep bed low and locked with side rails adjusted as appropriate  - Keep care items and personal belongings within reach  - Initiate and maintain comfort rounds  - Make Fall Risk Sign visible to staff  - Offer Toileting every 3 Hours, in advance of need  - Initiate/Maintain 3alarm  - Obtain necessary fall risk management equipment: 3  - Apply yellow socks and bracelet for high fall risk patients  - Consider moving patient to room near nurses station  Outcome: Progressing     Problem: PAIN - ADULT  Goal: Verbalizes/displays adequate comfort level or baseline comfort level  Description: Interventions:  - Encourage patient to monitor pain and request assistance  - Assess pain using appropriate pain scale  - Administer analgesics based on type and severity of pain and evaluate response  - Implement non-pharmacological measures as appropriate and evaluate response  - Consider cultural and social influences on pain and pain management  - Notify physician/advanced practitioner if interventions unsuccessful or patient reports new pain  Outcome: Progressing     Problem: INFECTION - ADULT  Goal: Absence or prevention of progression during hospitalization  Description: INTERVENTIONS:  - Assess and monitor for signs and symptoms of infection  - Monitor lab/diagnostic results  - Monitor all insertion sites, i e  indwelling lines, tubes, and drains  - Monitor endotracheal if appropriate and nasal secretions for changes in amount and color  - [place] appropriate cooling/warming therapies per order  - Administer medications as ordered  - Instruct and encourage patient and family to use good hand hygiene technique  - Identify and instruct in appropriate isolation precautions for identified infection/condition  Outcome: Progressing  Goal: Absence of fever/infection during neutropenic period  Description: INTERVENTIONS:  - Monitor WBC    Outcome: Progressing     Problem: SAFETY ADULT  Goal: Maintain or return to baseline ADL function  Description: INTERVENTIONS:  -  Assess patient's ability to carry out ADLs; assess patient's baseline for ADL function and identify physical deficits which impact ability to perform ADLs (bathing, care of mouth/teeth, toileting, grooming, dressing, etc )  - Assess/evaluate cause of self-care deficits   - Assess range of motion  - Assess patient's mobility; develop plan if impaired  - Assess patient's need for assistive devices and provide as appropriate  - Encourage maximum independence but intervene and supervise when necessary  - Involve family in performance of ADLs  - Assess for home care needs following discharge   - Consider OT consult to assist with ADL evaluation and planning for discharge  - Provide patient education as appropriate  Outcome: Progressing  Goal: Maintains/Returns to pre admission functional level  Description: INTERVENTIONS:  - Perform BMAT or MOVE assessment daily    - Set and communicate daily mobility goal to care team and patient/family/caregiver  - Collaborate with rehabilitation services on mobility goals if consulted  - Perform Range of Motion 3 times a day  - Reposition patient every 3 hours    - Dangle patient 3 times a day  - Stand patient 3 times a day  - Ambulate patient 3 times a day  - Out of bed to chair 3 times a day   - Out of bed for meals 3 times a day  - Out of bed for toileting  - Record patient progress and toleration of activity level   Outcome: Progressing  Goal: Patient will remain free of falls  Description: INTERVENTIONS:  - Educate patient/family on patient safety including physical limitations  - Instruct patient to call for assistance with activity   - Consult OT/PT to assist with strengthening/mobility   - Keep Call bell within reach  - Keep bed low and locked with side rails adjusted as appropriate  - Keep care items and personal belongings within reach  - Initiate and maintain comfort rounds  - Make Fall Risk Sign visible to staff  - Offer Toileting every 3 Hours, in advance of need  - Initiate/Maintain 3alarm  - Obtain necessary fall risk management equipment: 3  - Apply yellow socks and bracelet for high fall risk patients  - Consider moving patient to room near nurses station  Outcome: Progressing

## 2022-06-01 NOTE — PROGRESS NOTES
For questions/concerns on this patient, please reach out to the following:  [facility] G[facility]-GynOnc- Resident/AP    Gyn Oncology Progress note   Annmarie Knowles 78 y o  female MRN: [id number]  Unit/Bed#: [facility] 903-01 Encounter: [id number]    Assessment: 78 y o  with stage IVB high-grade endometrial cancer 1 Day Post-Op from diagnostic laparoscopy, right salpingo-oophorectomy, left salpingectomy, lysis of adhesions, and placement of vaginal packing due to inherent vaginal laceration, recovering well  Vaginal packing removed this morning  Plan:  Postoperative:   Continue routine postoperative care   PO pain medications as needed  Encourage OOB, ICS, SCDs    Anemia:   Lab Results   Component Value Date    HGB 7 5 (L) 05/31/2022     Stabilized postoperatively at 7 5, follow up morning hemoglobin     Vaginal packing  Removed on rounds this morning, will re-evaluate later this morning vaginal bleeding  Parks catheter to be removed, follow up void     Diabetes  Appreciate endocrine consultation and sliding scale     Endometrial cancer  Follow up outpatient for further treatment discussion    FEN: DM diet  DVT ppx: SCDs  Disposition: likely home later today    Subjective:    Annmarie Knowles has no current complaints  She has minimal pain, well controlled with PO medications without narcotics  Parks is in place  She is ambulating  Patient is currently passing flatus and has had no bowel movement  She is tolerating PO, and denies nausea or vomiting  Patient denies fever, chills, chest pain, shortness of breath, or calf tenderness  /56   Pulse 94   Temp 98 6 °F (37 °C)   Resp 16   Ht 5' 3" (1 6 m)   Wt 105 kg (232 lb)   SpO2 93%   BMI 41 10 kg/m²     I/O last 3 completed shifts:   In: 3350 [I V :3100; IV Piggyback:250]  Out: 300 [Urine:300]  I/O this shift:  In: 110 4 [I V :110 4]  Out: -     Lab Results   Component Value Date    WBC 6 32 05/31/2022    HGB 7 5 (L) 05/31/2022    HCT 25 6 (L) 05/31/2022    MCV 95 05/31/2022     05/31/2022       Lab Results   Component Value Date    K 4 1 05/23/2022    CO2 24 05/23/2022    CL 99 05/23/2022    BUN 19 05/23/2022    CREATININE 0 88 05/23/2022           Physical Exam  Constitutional:       General: She is not in acute distress  Appearance: She is obese  She is not ill-appearing or toxic-appearing  HENT:      Head: Normocephalic and atraumatic  Cardiovascular:      Rate and Rhythm: Normal rate  Pulses: Normal pulses  Pulmonary:      Effort: Pulmonary effort is normal  No respiratory distress  Abdominal:      General: There is no distension  Palpations: Abdomen is soft  Tenderness: There is no abdominal tenderness  There is no guarding or rebound  Comments: Incisions c/d/i, no erythema or drainage   Genitourinary:     General: Normal vulva  Comments: Vaginal packing removed, not saturated, old blood noted at distal end  Parks in place with about 2L clear urine  Musculoskeletal:         General: Normal range of motion  Right lower leg: No edema  Left lower leg: No edema  Skin:     General: Skin is warm and dry  Findings: No bruising, erythema or rash  Neurological:      General: No focal deficit present  Mental Status: She is alert  Mental status is at baseline     Psychiatric:         Mood and Affect: Mood normal          Behavior: Behavior normal            Nikia Winn MD  6/1/2022  5:15 AM

## 2022-06-02 LAB
ABO GROUP BLD BPU: NORMAL
ABO GROUP BLD BPU: NORMAL
BPU ID: NORMAL
BPU ID: NORMAL
CROSSMATCH: NORMAL
CROSSMATCH: NORMAL
UNIT DISPENSE STATUS: NORMAL
UNIT DISPENSE STATUS: NORMAL
UNIT PRODUCT CODE: NORMAL
UNIT PRODUCT CODE: NORMAL
UNIT PRODUCT VOLUME: 300 ML
UNIT PRODUCT VOLUME: 350 ML
UNIT RH: NORMAL
UNIT RH: NORMAL

## 2022-06-08 LAB
ALBUMIN SERPL-MCNC: 3.7 G/DL (ref 3.8–4.8)
ALBUMIN/GLOB SERPL: 1.9 {RATIO} (ref 1.2–2.2)
ALP SERPL-CCNC: 110 IU/L (ref 44–121)
ALT SERPL-CCNC: 17 IU/L (ref 0–32)
AST SERPL-CCNC: 13 IU/L (ref 0–40)
BASOPHILS # BLD AUTO: 0 X10E3/UL (ref 0–0.2)
BASOPHILS NFR BLD AUTO: 0 %
BILIRUB SERPL-MCNC: 0.3 MG/DL (ref 0–1.2)
BUN SERPL-MCNC: 10 MG/DL (ref 8–27)
BUN/CREAT SERPL: 13 (ref 12–28)
CALCIUM SERPL-MCNC: 8.7 MG/DL (ref 8.7–10.3)
CHLORIDE SERPL-SCNC: 100 MMOL/L (ref 96–106)
CO2 SERPL-SCNC: 23 MMOL/L (ref 20–29)
CREAT SERPL-MCNC: 0.79 MG/DL (ref 0.57–1)
EGFR: 82 ML/MIN/1.73
EOSINOPHIL # BLD AUTO: 0.1 X10E3/UL (ref 0–0.4)
EOSINOPHIL NFR BLD AUTO: 1 %
ERYTHROCYTE [DISTWIDTH] IN BLOOD BY AUTOMATED COUNT: 20.1 % (ref 11.7–15.4)
GLOBULIN SER-MCNC: 1.9 G/DL (ref 1.5–4.5)
GLUCOSE SERPL-MCNC: 261 MG/DL (ref 65–99)
HCT VFR BLD AUTO: 26.9 % (ref 34–46.6)
HGB BLD-MCNC: 8.4 G/DL (ref 11.1–15.9)
IMM GRANULOCYTES # BLD: 0.1 X10E3/UL (ref 0–0.1)
IMM GRANULOCYTES NFR BLD: 2 %
LYMPHOCYTES # BLD AUTO: 1 X10E3/UL (ref 0.7–3.1)
LYMPHOCYTES NFR BLD AUTO: 16 %
MAGNESIUM SERPL-MCNC: 1.7 MG/DL (ref 1.6–2.3)
MCH RBC QN AUTO: 28.2 PG (ref 26.6–33)
MCHC RBC AUTO-ENTMCNC: 31.2 G/DL (ref 31.5–35.7)
MCV RBC AUTO: 90 FL (ref 79–97)
MONOCYTES # BLD AUTO: 0.8 X10E3/UL (ref 0.1–0.9)
MONOCYTES NFR BLD AUTO: 12 %
NEUTROPHILS # BLD AUTO: 4.5 X10E3/UL (ref 1.4–7)
NEUTROPHILS NFR BLD AUTO: 69 %
NRBC BLD AUTO-RTO: 1 % (ref 0–0)
PLATELET # BLD AUTO: 296 X10E3/UL (ref 150–450)
POTASSIUM SERPL-SCNC: 4 MMOL/L (ref 3.5–5.2)
PROT SERPL-MCNC: 5.6 G/DL (ref 6–8.5)
RBC # BLD AUTO: 2.98 X10E6/UL (ref 3.77–5.28)
SODIUM SERPL-SCNC: 138 MMOL/L (ref 134–144)
WBC # BLD AUTO: 6.5 X10E3/UL (ref 3.4–10.8)

## 2022-06-14 LAB
ALBUMIN SERPL-MCNC: 4.1 G/DL (ref 3.8–4.8)
ALBUMIN/GLOB SERPL: 2.3 {RATIO} (ref 1.2–2.2)
ALP SERPL-CCNC: 99 IU/L (ref 44–121)
ALT SERPL-CCNC: 15 IU/L (ref 0–32)
AST SERPL-CCNC: 15 IU/L (ref 0–40)
BASOPHILS # BLD AUTO: 0 X10E3/UL (ref 0–0.2)
BASOPHILS NFR BLD AUTO: 0 %
BILIRUB SERPL-MCNC: 0.4 MG/DL (ref 0–1.2)
BUN SERPL-MCNC: 15 MG/DL (ref 8–27)
BUN/CREAT SERPL: 17 (ref 12–28)
CALCIUM SERPL-MCNC: 9.4 MG/DL (ref 8.7–10.3)
CHLORIDE SERPL-SCNC: 99 MMOL/L (ref 96–106)
CO2 SERPL-SCNC: 23 MMOL/L (ref 20–29)
CREAT SERPL-MCNC: 0.9 MG/DL (ref 0.57–1)
EGFR: 70 ML/MIN/1.73
EOSINOPHIL # BLD AUTO: 0.1 X10E3/UL (ref 0–0.4)
EOSINOPHIL NFR BLD AUTO: 2 %
ERYTHROCYTE [DISTWIDTH] IN BLOOD BY AUTOMATED COUNT: 19.1 % (ref 11.7–15.4)
GLOBULIN SER-MCNC: 1.8 G/DL (ref 1.5–4.5)
GLUCOSE SERPL-MCNC: 248 MG/DL (ref 65–99)
HCT VFR BLD AUTO: 28.8 % (ref 34–46.6)
HGB BLD-MCNC: 8.8 G/DL (ref 11.1–15.9)
IMM GRANULOCYTES # BLD: 0.1 X10E3/UL (ref 0–0.1)
IMM GRANULOCYTES NFR BLD: 1 %
LYMPHOCYTES # BLD AUTO: 1.4 X10E3/UL (ref 0.7–3.1)
LYMPHOCYTES NFR BLD AUTO: 21 %
MAGNESIUM SERPL-MCNC: 1.6 MG/DL (ref 1.6–2.3)
MCH RBC QN AUTO: 27.1 PG (ref 26.6–33)
MCHC RBC AUTO-ENTMCNC: 30.6 G/DL (ref 31.5–35.7)
MCV RBC AUTO: 89 FL (ref 79–97)
MONOCYTES # BLD AUTO: 0.8 X10E3/UL (ref 0.1–0.9)
MONOCYTES NFR BLD AUTO: 12 %
NEUTROPHILS # BLD AUTO: 4.4 X10E3/UL (ref 1.4–7)
NEUTROPHILS NFR BLD AUTO: 64 %
PLATELET # BLD AUTO: 299 X10E3/UL (ref 150–450)
POTASSIUM SERPL-SCNC: 4.3 MMOL/L (ref 3.5–5.2)
PROT SERPL-MCNC: 5.9 G/DL (ref 6–8.5)
RBC # BLD AUTO: 3.25 X10E6/UL (ref 3.77–5.28)
SODIUM SERPL-SCNC: 140 MMOL/L (ref 134–144)
WBC # BLD AUTO: 6.8 X10E3/UL (ref 3.4–10.8)

## 2022-06-16 ENCOUNTER — OFFICE VISIT (OUTPATIENT)
Dept: GYNECOLOGIC ONCOLOGY | Facility: HOSPITAL | Age: 67
End: 2022-06-16

## 2022-06-16 VITALS
HEIGHT: 63 IN | DIASTOLIC BLOOD PRESSURE: 60 MMHG | BODY MASS INDEX: 40.75 KG/M2 | HEART RATE: 102 BPM | TEMPERATURE: 97.1 F | RESPIRATION RATE: 16 BRPM | SYSTOLIC BLOOD PRESSURE: 128 MMHG | WEIGHT: 230 LBS | OXYGEN SATURATION: 97 %

## 2022-06-16 DIAGNOSIS — Z98.890 POSTOPERATIVE STATE: ICD-10-CM

## 2022-06-16 DIAGNOSIS — C54.1 ENDOMETRIAL CANCER (HCC): Primary | ICD-10-CM

## 2022-06-16 PROCEDURE — 99024 POSTOP FOLLOW-UP VISIT: CPT | Performed by: PHYSICIAN ASSISTANT

## 2022-06-16 NOTE — ASSESSMENT & PLAN NOTE
70-year-old with stage IVB high-grade endometrial cancer with liver, retroperitoneal lymph node and lung metastatic disease s/p 4 cycles neoadjuvant chemotherapy with taxol and carboplatin as well as attempted surgical resection, who underwent robotic-assisted BSO and lysis of adhesions on 5/31/22  Hysterectomy was aborted due to adhesive disease as well as parametrial/cervical involvement  She is recovering well from surgery  Will plan to re-start adjuvant chemotherapy with taxol 135 mg/m2 and carboplatin AUC 6 every 21 days  Consideration of avastin will be given with cycle 6 of treatment and further discussed with patient at next office visit with Dr Nikunj Mccain  She understands that we would not traditionally begin avastin until 4-6 weeks post-operatively due to risk of delayed healing  Return to the office as per her chemotherapy calendar

## 2022-06-16 NOTE — PROGRESS NOTES
Assessment/Plan:    Problem List Items Addressed This Visit        Genitourinary    Endometrial cancer ([facility] [address] ) - Primary     65-year-old with stage IVB high-grade endometrial cancer with liver, retroperitoneal lymph node and lung metastatic disease s/p 4 cycles neoadjuvant chemotherapy with taxol and carboplatin as well as attempted surgical resection, who underwent robotic-assisted BSO and lysis of adhesions on 5/31/22  Hysterectomy was aborted due to adhesive disease as well as parametrial/cervical involvement  She is recovering well from surgery  Will plan to re-start adjuvant chemotherapy with taxol 135 mg/m2 and carboplatin AUC 6 every 21 days  Consideration of avastin will be given with cycle 6 of treatment and further discussed with patient at next office visit with Dr Tasneem Bradley  She understands that we would not traditionally begin avastin until 4-6 weeks post-operatively due to risk of delayed healing  Return to the office as per her chemotherapy calendar  Relevant Orders    Infusion Calculated Appointment Request    Infusion Calculated Appointment Request    Infusion Calculated Appointment Request    Infusion Calculated Appointment Request       Other    Postoperative state            CHIEF COMPLAINT:  Post-operative evaluation     Problem:  Cancer Staging  Endometrial cancer [facility])  Staging form: Corpus Uteri - Carcinoma, AJCC 8th Edition  - Clinical: FIGO Stage IVB (cM1) - Signed by Evelyn Haywood MD on 2/17/2022        Previous therapy:  Oncology History   Endometrial cancer ([facility] [address] )   1/26/2022 Initial Diagnosis    Endometrial cancer ([facility] [address] )     2/17/2022 -  Cancer Staged    Staging form: Corpus Uteri - Carcinoma, AJCC 8th Edition  - Clinical: FIGO Stage IVB (cM1) - Signed by Evelyn Haywood MD on 2/17/2022  Histologic grade (G): G3  Histologic grading system: 3 grade system       2/24/2022 -  Chemotherapy    Taxol 175 mg/m2 and carboplatin AUC 6 every 21 days   Taxol dose-reduced to 135 mg/m2 with cycle 2 due to arthralgias/myalgias  She has received 4 cycles in the neoadjuvant setting      3/11/2022 Genomic Testing    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, ER/IA positive, microsatellite stable  No other targeted therapy opportunities  5/31/2022 Surgery    Robotic-assisted BSO and lysis of adhesions  Hysterectomy aborted due to parametrial/cervical involvement and adhesive disease  Patient ID: Javed Costello is a 75 y o  female  who presents to the office for post-operative evaluation  Overall, she is recovering well from surgery and without acute complaints  She has been afebrile  She denies vaginal bleeding or discharge  She denies abdominal pain, nausea or vomiting  Normal bowel/bladder function  She notes sensitivity of her incisions  The patient also notes significant left buttocks, back and leg pain following surgery  This is improving  She denies leg weakness  The following portions of the patient's history were reviewed and updated as appropriate: allergies, current medications, past medical history, past surgical history and problem list     Review of Systems   Constitutional: Negative  Respiratory: Negative  Cardiovascular: Negative  Gastrointestinal: Negative  Genitourinary: Negative  Skin: Negative  Current Outpatient Medications:     acetaminophen (TYLENOL) 650 mg CR tablet, Take 1,300 mg by mouth in the morning and 1,300 mg before bedtime  , Disp: , Rfl:     Bacillus Coagulans-Inulin (Probiotic) 1-250 BILLION-MG CAPS, as directed, Disp: , Rfl:     BD Pen Needle Yoana 2nd Gen 32G X 4 MM MISC, 2 (two) times a day, Disp: , Rfl:     Contour Next Test test strip, 2 (two) times a day Test, Disp: , Rfl:     fexofenadine (ALLEGRA) 180 MG tablet, every 24 hours, Disp: , Rfl:     furosemide (LASIX) 40 mg tablet, Take 1 tablet by mouth daily, Disp: , Rfl:     gabapentin (NEURONTIN) 300 mg capsule, Take 300 mg by mouth 3 (three) times a day, Disp: , Rfl:     ibuprofen (MOTRIN) 200 mg tablet, Take 400 mg by mouth every 6 (six) hours as needed for mild pain, Disp: , Rfl:     lidocaine-prilocaine (EMLA) cream, Apply to port site 30 min prior to labs/chemo, Disp: 30 g, Rfl: 2    lisinopril (ZESTRIL) 20 mg tablet, Take 1 tablet by mouth daily, Disp: , Rfl:     LORazepam (ATIVAN) 0 5 mg tablet, Take 0 5 mg by mouth in the morning and 0 5 mg in the evening , Disp: , Rfl:     magnesium oxide (MAG-OX) 400 mg, Take 400 mg by mouth in the morning , Disp: , Rfl:     metFORMIN (GLUCOPHAGE) 1000 MG tablet, Take 1 tablet by mouth 2 (two) times a day with meals, Disp: , Rfl:     Microlet Lancets MISC, 2 (two) times a day Test, Disp: , Rfl:     omeprazole (PriLOSEC) 20 mg delayed release capsule, every 24 hours, Disp: , Rfl:     ondansetron (ZOFRAN) 8 mg tablet, Take 1 tablet (8 mg total) by mouth every 8 (eight) hours as needed for nausea or vomiting, Disp: 20 tablet, Rfl: 1    Rosuvastatin Calcium 20 MG CPSP, Take 1 tablet by mouth daily, Disp: , Rfl:     sertraline (ZOLOFT) 50 mg tablet, Take 50 mg by mouth daily, Disp: , Rfl:     sitaGLIPtin (JANUVIA) 100 mg tablet, Take 1 tablet by mouth daily, Disp: , Rfl:     Toujeo SoloStar 300 units/mL CONCENTRATED U-300 injection pen (1-unit dial), every 12 (twelve) hours 34 units in the am and 36 units at night, Disp: , Rfl:     oxyCODONE (Roxicodone) 5 immediate release tablet, Take 1 tablet (5 mg total) by mouth every 6 (six) hours as needed for moderate pain Max Daily Amount: 20 mg (Patient not taking: No sig reported), Disp: 20 tablet, Rfl: 0    Objective:    Blood pressure 128/60, pulse 102, temperature (!) 97 1 °F (36 2 °C), temperature source Temporal, resp  rate 16, height 5' 3" (1 6 m), weight 104 kg (230 lb), SpO2 97 %  Body mass index is 40 74 kg/m²  Body surface area is 2 05 meters squared  Physical Exam  Vitals reviewed     Constitutional:       General: She is not in acute distress  Appearance: Normal appearance  HENT:      Head: Normocephalic and atraumatic  Mouth/Throat:      Mouth: Mucous membranes are moist    Pulmonary:      Effort: Pulmonary effort is normal       Breath sounds: Normal breath sounds  Abdominal:      Palpations: Abdomen is soft  There is no mass  Tenderness: There is no abdominal tenderness  Skin:     General: Skin is warm and dry  Comments: Surgical trocar sites are intact, clean and dry without induration, erythema or purulent drainage  Neurological:      Mental Status: She is alert and oriented to person, place, and time  Psychiatric:         Mood and Affect: Mood normal          Behavior: Behavior normal          Thought Content: Thought content normal          Judgment: Judgment normal      Performance status is zero

## 2022-06-21 LAB
ALBUMIN SERPL-MCNC: 4 G/DL (ref 3.8–4.8)
ALBUMIN/GLOB SERPL: 2.1 {RATIO} (ref 1.2–2.2)
ALP SERPL-CCNC: 100 IU/L (ref 44–121)
ALT SERPL-CCNC: 18 IU/L (ref 0–32)
AST SERPL-CCNC: 15 IU/L (ref 0–40)
BASOPHILS # BLD AUTO: 0.1 X10E3/UL (ref 0–0.2)
BASOPHILS NFR BLD AUTO: 1 %
BILIRUB SERPL-MCNC: 0.4 MG/DL (ref 0–1.2)
BUN SERPL-MCNC: 16 MG/DL (ref 8–27)
BUN/CREAT SERPL: 19 (ref 12–28)
CALCIUM SERPL-MCNC: 9.2 MG/DL (ref 8.7–10.3)
CHLORIDE SERPL-SCNC: 99 MMOL/L (ref 96–106)
CO2 SERPL-SCNC: 24 MMOL/L (ref 20–29)
CREAT SERPL-MCNC: 0.83 MG/DL (ref 0.57–1)
EGFR: 77 ML/MIN/1.73
EOSINOPHIL # BLD AUTO: 0.2 X10E3/UL (ref 0–0.4)
EOSINOPHIL NFR BLD AUTO: 3 %
ERYTHROCYTE [DISTWIDTH] IN BLOOD BY AUTOMATED COUNT: 18.5 % (ref 11.7–15.4)
GLOBULIN SER-MCNC: 1.9 G/DL (ref 1.5–4.5)
GLUCOSE SERPL-MCNC: 268 MG/DL (ref 65–99)
HCT VFR BLD AUTO: 29 % (ref 34–46.6)
HGB BLD-MCNC: 9 G/DL (ref 11.1–15.9)
IMM GRANULOCYTES # BLD: 0.1 X10E3/UL (ref 0–0.1)
IMM GRANULOCYTES NFR BLD: 1 %
LYMPHOCYTES # BLD AUTO: 1.4 X10E3/UL (ref 0.7–3.1)
LYMPHOCYTES NFR BLD AUTO: 19 %
MAGNESIUM SERPL-MCNC: 1.6 MG/DL (ref 1.6–2.3)
MCH RBC QN AUTO: 27.7 PG (ref 26.6–33)
MCHC RBC AUTO-ENTMCNC: 31 G/DL (ref 31.5–35.7)
MCV RBC AUTO: 89 FL (ref 79–97)
MONOCYTES # BLD AUTO: 1.2 X10E3/UL (ref 0.1–0.9)
MONOCYTES NFR BLD AUTO: 17 %
NEUTROPHILS # BLD AUTO: 4.4 X10E3/UL (ref 1.4–7)
NEUTROPHILS NFR BLD AUTO: 59 %
PLATELET # BLD AUTO: 246 X10E3/UL (ref 150–450)
POTASSIUM SERPL-SCNC: 4.1 MMOL/L (ref 3.5–5.2)
PROT SERPL-MCNC: 5.9 G/DL (ref 6–8.5)
RBC # BLD AUTO: 3.25 X10E6/UL (ref 3.77–5.28)
SODIUM SERPL-SCNC: 140 MMOL/L (ref 134–144)
WBC # BLD AUTO: 7.3 X10E3/UL (ref 3.4–10.8)

## 2022-06-22 RX ORDER — PALONOSETRON 0.05 MG/ML
0.25 INJECTION, SOLUTION INTRAVENOUS ONCE
Status: CANCELLED | OUTPATIENT
Start: 2022-06-23

## 2022-06-22 RX ORDER — SODIUM CHLORIDE 9 MG/ML
20 INJECTION, SOLUTION INTRAVENOUS ONCE
Status: CANCELLED | OUTPATIENT
Start: 2022-06-23

## 2022-06-23 ENCOUNTER — HOSPITAL ENCOUNTER (OUTPATIENT)
Dept: INFUSION CENTER | Facility: HOSPITAL | Age: 67
Discharge: HOME | End: 2022-06-23
Attending: OBSTETRICS & GYNECOLOGY
Payer: COMMERCIAL

## 2022-06-23 VITALS
BODY MASS INDEX: 39.84 KG/M2 | HEIGHT: 63 IN | WEIGHT: 224.87 LBS | TEMPERATURE: 97.6 F | OXYGEN SATURATION: 93 % | DIASTOLIC BLOOD PRESSURE: 60 MMHG | HEART RATE: 101 BPM | SYSTOLIC BLOOD PRESSURE: 134 MMHG | RESPIRATION RATE: 20 BRPM

## 2022-06-23 DIAGNOSIS — C54.1 ENDOMETRIAL CANCER (HCC): Primary | ICD-10-CM

## 2022-06-23 PROCEDURE — 96417 CHEMO IV INFUS EACH ADDL SEQ: CPT

## 2022-06-23 PROCEDURE — 96367 TX/PROPH/DG ADDL SEQ IV INF: CPT

## 2022-06-23 PROCEDURE — 96375 TX/PRO/DX INJ NEW DRUG ADDON: CPT

## 2022-06-23 PROCEDURE — 96413 CHEMO IV INFUSION 1 HR: CPT

## 2022-06-23 PROCEDURE — 96415 CHEMO IV INFUSION ADDL HR: CPT

## 2022-06-23 RX ORDER — SODIUM CHLORIDE 9 MG/ML
20 INJECTION, SOLUTION INTRAVENOUS ONCE
Status: COMPLETED | OUTPATIENT
Start: 2022-06-23 | End: 2022-06-23

## 2022-06-23 RX ORDER — PALONOSETRON 0.05 MG/ML
0.25 INJECTION, SOLUTION INTRAVENOUS ONCE
Status: COMPLETED | OUTPATIENT
Start: 2022-06-23 | End: 2022-06-23

## 2022-06-23 RX ADMIN — DEXAMETHASONE SODIUM PHOSPHATE 20 MG: 10 INJECTION, SOLUTION INTRAMUSCULAR; INTRAVENOUS at 09:07

## 2022-06-23 RX ADMIN — DIPHENHYDRAMINE HYDROCHLORIDE 25 MG: 50 INJECTION, SOLUTION INTRAMUSCULAR; INTRAVENOUS at 09:31

## 2022-06-23 RX ADMIN — PACLITAXEL 276.6 MG: 6 INJECTION, SOLUTION INTRAVENOUS at 11:03

## 2022-06-23 RX ADMIN — FAMOTIDINE 20 MG: 10 INJECTION INTRAVENOUS at 09:54

## 2022-06-23 RX ADMIN — FOSAPREPITANT 150 MG: 150 INJECTION, POWDER, LYOPHILIZED, FOR SOLUTION INTRAVENOUS at 10:14

## 2022-06-23 RX ADMIN — CARBOPLATIN 598.8 MG: 10 INJECTION, SOLUTION INTRAVENOUS at 14:06

## 2022-06-23 RX ADMIN — PALONOSETRON 0.25 MG: 0.05 INJECTION, SOLUTION INTRAVENOUS at 09:07

## 2022-06-23 RX ADMIN — SODIUM CHLORIDE 20 ML/HR: 0.9 INJECTION, SOLUTION INTRAVENOUS at 09:07

## 2022-06-28 LAB
ALBUMIN SERPL-MCNC: 4.1 G/DL (ref 3.8–4.8)
ALBUMIN/GLOB SERPL: 1.9 {RATIO} (ref 1.2–2.2)
ALP SERPL-CCNC: 96 IU/L (ref 44–121)
ALT SERPL-CCNC: 24 IU/L (ref 0–32)
AST SERPL-CCNC: 14 IU/L (ref 0–40)
BASOPHILS # BLD AUTO: 0 X10E3/UL (ref 0–0.2)
BASOPHILS NFR BLD AUTO: 0 %
BILIRUB SERPL-MCNC: 0.3 MG/DL (ref 0–1.2)
BUN SERPL-MCNC: 16 MG/DL (ref 8–27)
BUN/CREAT SERPL: 23 (ref 12–28)
CALCIUM SERPL-MCNC: 9.5 MG/DL (ref 8.7–10.3)
CHLORIDE SERPL-SCNC: 99 MMOL/L (ref 96–106)
CO2 SERPL-SCNC: 24 MMOL/L (ref 20–29)
CREAT SERPL-MCNC: 0.71 MG/DL (ref 0.57–1)
EGFR: 93 ML/MIN/1.73
EOSINOPHIL # BLD AUTO: 0.1 X10E3/UL (ref 0–0.4)
EOSINOPHIL NFR BLD AUTO: 2 %
ERYTHROCYTE [DISTWIDTH] IN BLOOD BY AUTOMATED COUNT: 18 % (ref 11.7–15.4)
GLOBULIN SER-MCNC: 2.2 G/DL (ref 1.5–4.5)
GLUCOSE SERPL-MCNC: 250 MG/DL (ref 65–99)
HCT VFR BLD AUTO: 28.1 % (ref 34–46.6)
HGB BLD-MCNC: 8.6 G/DL (ref 11.1–15.9)
IMM GRANULOCYTES # BLD: 0 X10E3/UL (ref 0–0.1)
IMM GRANULOCYTES NFR BLD: 0 %
LYMPHOCYTES # BLD AUTO: 0.7 X10E3/UL (ref 0.7–3.1)
LYMPHOCYTES NFR BLD AUTO: 19 %
MAGNESIUM SERPL-MCNC: 1.8 MG/DL (ref 1.6–2.3)
MCH RBC QN AUTO: 26.3 PG (ref 26.6–33)
MCHC RBC AUTO-ENTMCNC: 30.6 G/DL (ref 31.5–35.7)
MCV RBC AUTO: 86 FL (ref 79–97)
MONOCYTES # BLD AUTO: 0.2 X10E3/UL (ref 0.1–0.9)
MONOCYTES NFR BLD AUTO: 6 %
NEUTROPHILS # BLD AUTO: 2.6 X10E3/UL (ref 1.4–7)
NEUTROPHILS NFR BLD AUTO: 73 %
PLATELET # BLD AUTO: 185 X10E3/UL (ref 150–450)
POTASSIUM SERPL-SCNC: 4.2 MMOL/L (ref 3.5–5.2)
PROT SERPL-MCNC: 6.3 G/DL (ref 6–8.5)
RBC # BLD AUTO: 3.27 X10E6/UL (ref 3.77–5.28)
SODIUM SERPL-SCNC: 139 MMOL/L (ref 134–144)
WBC # BLD AUTO: 3.5 X10E3/UL (ref 3.4–10.8)

## 2022-07-06 LAB
ALBUMIN SERPL-MCNC: 3.9 G/DL (ref 3.8–4.8)
ALBUMIN/GLOB SERPL: 2.1 {RATIO} (ref 1.2–2.2)
ALP SERPL-CCNC: 98 IU/L (ref 44–121)
ALT SERPL-CCNC: 24 IU/L (ref 0–32)
AST SERPL-CCNC: 21 IU/L (ref 0–40)
BASOPHILS # BLD AUTO: 0 X10E3/UL (ref 0–0.2)
BASOPHILS NFR BLD AUTO: 0 %
BILIRUB SERPL-MCNC: 0.3 MG/DL (ref 0–1.2)
BUN SERPL-MCNC: 15 MG/DL (ref 8–27)
BUN/CREAT SERPL: 19 (ref 12–28)
CALCIUM SERPL-MCNC: 9.1 MG/DL (ref 8.7–10.3)
CHLORIDE SERPL-SCNC: 100 MMOL/L (ref 96–106)
CO2 SERPL-SCNC: 22 MMOL/L (ref 20–29)
CREAT SERPL-MCNC: 0.79 MG/DL (ref 0.57–1)
EGFR: 81 ML/MIN/1.73
EOSINOPHIL # BLD AUTO: 0.1 X10E3/UL (ref 0–0.4)
EOSINOPHIL NFR BLD AUTO: 2 %
ERYTHROCYTE [DISTWIDTH] IN BLOOD BY AUTOMATED COUNT: 18.2 % (ref 11.7–15.4)
GLOBULIN SER-MCNC: 1.9 G/DL (ref 1.5–4.5)
GLUCOSE SERPL-MCNC: 231 MG/DL (ref 65–99)
HCT VFR BLD AUTO: 28.3 % (ref 34–46.6)
HGB BLD-MCNC: 8.7 G/DL (ref 11.1–15.9)
IMM GRANULOCYTES # BLD: 0 X10E3/UL (ref 0–0.1)
IMM GRANULOCYTES NFR BLD: 0 %
LYMPHOCYTES # BLD AUTO: 1.4 X10E3/UL (ref 0.7–3.1)
LYMPHOCYTES NFR BLD AUTO: 30 %
MAGNESIUM SERPL-MCNC: 1.6 MG/DL (ref 1.6–2.3)
MCH RBC QN AUTO: 27.2 PG (ref 26.6–33)
MCHC RBC AUTO-ENTMCNC: 30.7 G/DL (ref 31.5–35.7)
MCV RBC AUTO: 88 FL (ref 79–97)
MONOCYTES # BLD AUTO: 0.8 X10E3/UL (ref 0.1–0.9)
MONOCYTES NFR BLD AUTO: 17 %
NEUTROPHILS # BLD AUTO: 2.3 X10E3/UL (ref 1.4–7)
NEUTROPHILS NFR BLD AUTO: 51 %
PLATELET # BLD AUTO: 113 X10E3/UL (ref 150–450)
POTASSIUM SERPL-SCNC: 3.8 MMOL/L (ref 3.5–5.2)
PROT SERPL-MCNC: 5.8 G/DL (ref 6–8.5)
RBC # BLD AUTO: 3.2 X10E6/UL (ref 3.77–5.28)
SODIUM SERPL-SCNC: 141 MMOL/L (ref 134–144)
WBC # BLD AUTO: 4.6 X10E3/UL (ref 3.4–10.8)

## 2022-07-07 ENCOUNTER — OFFICE VISIT (OUTPATIENT)
Dept: GYNECOLOGIC ONCOLOGY | Facility: HOSPITAL | Age: 67
End: 2022-07-07
Payer: COMMERCIAL

## 2022-07-07 VITALS
TEMPERATURE: 97.2 F | BODY MASS INDEX: 40.75 KG/M2 | WEIGHT: 230 LBS | HEART RATE: 102 BPM | SYSTOLIC BLOOD PRESSURE: 110 MMHG | DIASTOLIC BLOOD PRESSURE: 58 MMHG | HEIGHT: 63 IN

## 2022-07-07 DIAGNOSIS — C54.1 ENDOMETRIAL CANCER (HCC): Primary | ICD-10-CM

## 2022-07-07 PROCEDURE — 99215 OFFICE O/P EST HI 40 MIN: CPT | Performed by: OBSTETRICS & GYNECOLOGY

## 2022-07-07 NOTE — ASSESSMENT & PLAN NOTE
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  Her performance status is 0   1  I discussed the risks and benefits of adding Avastin to her treatment regimen to augment the response  She understands the risks including hypertension, bleeding, thrombosis, kidney damage, bowel perforation, RPLE and agrees to add Avastin to her existing chemotherapy regimen  The dose will be 15 milligrams/kilogram every 3 weeks  2  She has chemotherapy-induced anemia with hemoglobin 8 7 grams/deciliter  Will continue to trend but she may require palliative transfusion with her next cycle  Only a brief time was spent on the postoperative history and physical examination

## 2022-07-07 NOTE — PROGRESS NOTES
Assessment/Plan:    Problem List Items Addressed This Visit        Genitourinary    Endometrial [facility] ([facility] ) - Primary     76-year-old with stage IV B high-grade endometrial cancer, liver, retroperitoneal lymph node and lung metastatic disease who has received 5 cycles of chemotherapy  Attempt was made to perform hysterectomy after cycle 4   Hysterectomy was aborted given extensive parametrial disease  I reviewed her CBC, CMP  She is here prior to cycle 6  Of chemotherapy with carboplatin at AUC 6, Taxol 135 milligrams/meter squared  She is recovering well from surgery  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  Her performance status is 0   1  I discussed the risks and benefits of adding Avastin to her treatment regimen to augment the response  She understands the risks including hypertension, bleeding, thrombosis, kidney damage, bowel perforation, RPLE and agrees to add Avastin to her existing chemotherapy regimen  The dose will be 15 milligrams/kilogram every 3 weeks  2  She has chemotherapy-induced anemia with hemoglobin 8 7 grams/deciliter  Will continue to trend but she may require palliative transfusion with her next cycle  Only a brief time was spent on the postoperative history and physical examination                     CHIEF COMPLAINT:  Treatment discussion      Problem:  Cancer Staging  Endometrial cancer [facility])  Staging form: Corpus Uteri - Carcinoma, AJCC 8th Edition  - Clinical: FIGO Stage IVB (cM1) - Signed by Cathy Hernández MD on 2/17/2022        Previous therapy:  Oncology History   Endometrial [facility] ([facility] )   1/26/2022 Initial Diagnosis    Endometrial cancer ([facility] )     2/17/2022 -  Cancer Staged    Staging form: Corpus Uteri - Carcinoma, AJCC 8th Edition  - Clinical: FIGO Stage IVB (cM1) - Signed by Cathy Hernández MD on 2/17/2022  Histologic grade (G): G3  Histologic grading system: 3 grade system       2/24/2022 -  Chemotherapy    Taxol 175 mg/m2 and carboplatin AUC 6 every 21 days  Taxol dose-reduced to 135 mg/m2 with cycle 2 due to arthralgias/myalgias  She has received 4 cycles in the neoadjuvant setting      3/11/2022 Genomic Testing    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, ER/MI positive, microsatellite stable  No other targeted therapy opportunities  5/31/2022 Surgery    Robotic-assisted BSO and lysis of adhesions  Hysterectomy aborted due to parametrial/cervical involvement and adhesive disease  Patient ID: Stella Mccray is a 75 y o  female  Returns for pre chemotherapy discussion  She is recovering well from her surgery  She is tolerating a regular diet  She has normal bowel bladder function  No vaginal bleeding  She is here prior to cycle 6  Of carboplatin and Taxol chemotherapy with consideration of adding Avastin treatment  She does have left-sided gluteal discomfort which is slowly improving after surgery  She also noted some shooting pains down her leg as well as some tingling sensation on the inner part of her left calf  CBC from July 5th revealed hemoglobin of 8 7 grams/deciliter with an ANC of 2 3 and platelet count 999 K  CMP was normal with the exception of a glucose of 231 mg/dL  No other interval change in her medications or medical history since her last visit  Overall, her quality of life is good  The following portions of the patient's history were reviewed and updated as appropriate: allergies, current medications, past family history, past medical history, past social history, past surgical history and problem list     Review of Systems   Constitutional: Negative for activity change and unexpected weight change  HENT: Negative  Eyes: Negative  Respiratory: Negative  Cardiovascular: Negative  Gastrointestinal: Negative for abdominal distention and abdominal pain  Endocrine: Negative  Genitourinary: Negative for pelvic pain and vaginal bleeding  Musculoskeletal: Negative  Skin: Negative  Allergic/Immunologic: Negative  Neurological: Negative  Shooting, burning pains left lower extremity  Hematological: Negative  Psychiatric/Behavioral: Negative  Current Outpatient Medications   Medication Sig Dispense Refill    acetaminophen (TYLENOL) 650 mg CR tablet Take 1,300 mg by mouth in the morning and 1,300 mg before bedtime   Bacillus Coagulans-Inulin (Probiotic) 1-250 BILLION-MG CAPS as directed      BD Pen Needle Yoana 2nd Gen 32G X 4 MM MISC 2 (two) times a day      Contour Next Test test strip 2 (two) times a day Test      fexofenadine (ALLEGRA) 180 MG tablet every 24 hours      furosemide (LASIX) 40 mg tablet Take 1 tablet by mouth daily      gabapentin (NEURONTIN) 300 mg capsule Take 300 mg by mouth 3 (three) times a day      ibuprofen (MOTRIN) 200 mg tablet Take 400 mg by mouth every 6 (six) hours as needed for mild pain      lidocaine-prilocaine (EMLA) cream Apply to port site 30 min prior to labs/chemo 30 g 2    lisinopril (ZESTRIL) 20 mg tablet Take 1 tablet by mouth daily      LORazepam (ATIVAN) 0 5 mg tablet Take 0 5 mg by mouth in the morning and 0 5 mg in the evening   magnesium oxide (MAG-OX) 400 mg Take 400 mg by mouth in the morning        metFORMIN (GLUCOPHAGE) 1000 MG tablet Take 1 tablet by mouth 2 (two) times a day with meals      Microlet Lancets MISC 2 (two) times a day Test      omeprazole (PriLOSEC) 20 mg delayed release capsule every 24 hours      ondansetron (ZOFRAN) 8 mg tablet Take 1 tablet (8 mg total) by mouth every 8 (eight) hours as needed for nausea or vomiting 20 tablet 1    oxyCODONE (Roxicodone) 5 immediate release tablet Take 1 tablet (5 mg total) by mouth every 6 (six) hours as needed for moderate pain Max Daily Amount: 20 mg (Patient not taking: No sig reported) 20 tablet 0    Rosuvastatin Calcium 20 MG CPSP Take 1 tablet by mouth daily      sertraline (ZOLOFT) 50 mg tablet Take 50 mg by mouth daily  sitaGLIPtin (JANUVIA) 100 mg tablet Take 1 tablet by mouth daily      Toujeo SoloStar 300 units/mL CONCENTRATED U-300 injection pen (1-unit dial) every 12 (twelve) hours 34 units in the am and 36 units at night       No current facility-administered medications for this visit  Objective:    Blood pressure 110/58, pulse 102, temperature (!) 97 2 °F (36 2 °C), temperature source Tympanic, height 5' 3 31" (1 608 m), weight 104 kg (230 lb)  Body mass index is 40 34 kg/m²  Body surface area is 2 06 meters squared  Physical Exam  Vitals reviewed  Constitutional:       General: She is not in acute distress  Appearance: Normal appearance  She is not ill-appearing  HENT:      Head: Normocephalic and atraumatic  Mouth/Throat:      Mouth: Mucous membranes are moist    Eyes:      General: No scleral icterus  Right eye: No discharge  Left eye: No discharge  Conjunctiva/sclera: Conjunctivae normal    Pulmonary:      Effort: Pulmonary effort is normal    Musculoskeletal:      Right lower leg: No edema  Left lower leg: No edema  Skin:     General: Skin is warm and dry  Coloration: Skin is not jaundiced  Findings: No rash  Comments: Incisions clean dry and intact   Neurological:      General: No focal deficit present  Mental Status: She is alert and oriented to person, place, and time  Cranial Nerves: No cranial nerve deficit  Motor: No weakness  Gait: Gait normal    Psychiatric:         Mood and Affect: Mood normal          Behavior: Behavior normal          Thought Content:  Thought content normal          Judgment: Judgment normal          Lab Results   Component Value Date     12 2 05/10/2022     Lab Results   Component Value Date    K 3 8 07/05/2022     07/05/2022    CO2 22 07/05/2022    BUN 15 07/05/2022    CREATININE 0 79 07/05/2022    CALCIUM 8 5 06/01/2022    AST 21 07/05/2022    ALT 24 07/05/2022    EGFR 81 07/05/2022 Lab Results   Component Value Date    WBC 4 6 07/05/2022    HGB 8 7 (L) 07/05/2022    HCT 28 3 (L) 07/05/2022    MCV 88 07/05/2022     (L) 07/05/2022     Lab Results   Component Value Date    NEUTROABS 2 3 07/05/2022        Trend:  Lab Results   Component Value Date     12 2 05/10/2022     13 6 04/25/2022     10 9 04/04/2022     11 4 03/15/2022     10 9 02/28/2022

## 2022-07-11 LAB
CREAT ?TM UR-SCNC: 52.9 UMOL/L
EXT PROTEIN URINE: 188.6
PROT/CREAT UR: 280 MG/G{CREAT}

## 2022-07-13 DIAGNOSIS — C54.1 ENDOMETRIAL CANCER (HCC): Primary | ICD-10-CM

## 2022-07-13 RX ORDER — PALONOSETRON 0.05 MG/ML
0.25 INJECTION, SOLUTION INTRAVENOUS ONCE
Status: CANCELLED | OUTPATIENT
Start: 2022-07-21

## 2022-07-13 RX ORDER — SODIUM CHLORIDE 9 MG/ML
20 INJECTION, SOLUTION INTRAVENOUS ONCE
Status: CANCELLED | OUTPATIENT
Start: 2022-07-21

## 2022-07-14 ENCOUNTER — HOSPITAL ENCOUNTER (OUTPATIENT)
Dept: INFUSION CENTER | Facility: HOSPITAL | Age: 67
Discharge: HOME | End: 2022-07-14
Attending: OBSTETRICS & GYNECOLOGY
Payer: COMMERCIAL

## 2022-07-14 VITALS
WEIGHT: 229.5 LBS | HEIGHT: 63 IN | TEMPERATURE: 97.7 F | OXYGEN SATURATION: 94 % | RESPIRATION RATE: 16 BRPM | HEART RATE: 82 BPM | BODY MASS INDEX: 40.66 KG/M2 | SYSTOLIC BLOOD PRESSURE: 126 MMHG | DIASTOLIC BLOOD PRESSURE: 51 MMHG

## 2022-07-14 DIAGNOSIS — D64.81 ANEMIA ASSOCIATED WITH CHEMOTHERAPY: ICD-10-CM

## 2022-07-14 DIAGNOSIS — D64.81 ANEMIA ASSOCIATED WITH CHEMOTHERAPY: Primary | ICD-10-CM

## 2022-07-14 DIAGNOSIS — C54.1 ENDOMETRIAL CANCER (HCC): Primary | ICD-10-CM

## 2022-07-14 DIAGNOSIS — C54.1 ENDOMETRIAL CANCER (HCC): ICD-10-CM

## 2022-07-14 DIAGNOSIS — T45.1X5A ANEMIA ASSOCIATED WITH CHEMOTHERAPY: Primary | ICD-10-CM

## 2022-07-14 DIAGNOSIS — T45.1X5A ANEMIA ASSOCIATED WITH CHEMOTHERAPY: ICD-10-CM

## 2022-07-14 LAB
ABO GROUP BLD: NORMAL
BASOPHILS # BLD AUTO: 0.02 THOUSANDS/ΜL (ref 0–0.1)
BASOPHILS NFR BLD AUTO: 0 % (ref 0–1)
BLD GP AB SCN SERPL QL: NEGATIVE
EOSINOPHIL # BLD AUTO: 0.05 THOUSAND/ΜL (ref 0–0.61)
EOSINOPHIL NFR BLD AUTO: 1 % (ref 0–6)
ERYTHROCYTE [DISTWIDTH] IN BLOOD BY AUTOMATED COUNT: 18.7 % (ref 11.6–15.1)
HCT VFR BLD AUTO: 27.1 % (ref 34.8–46.1)
HGB BLD-MCNC: 7.8 G/DL (ref 11.5–15.4)
IMM GRANULOCYTES # BLD AUTO: 0.03 THOUSAND/UL (ref 0–0.2)
IMM GRANULOCYTES NFR BLD AUTO: 1 % (ref 0–2)
LYMPHOCYTES # BLD AUTO: 0.99 THOUSANDS/ΜL (ref 0.6–4.47)
LYMPHOCYTES NFR BLD AUTO: 22 % (ref 14–44)
MCH RBC QN AUTO: 26.6 PG (ref 26.8–34.3)
MCHC RBC AUTO-ENTMCNC: 28.8 G/DL (ref 31.4–37.4)
MCV RBC AUTO: 93 FL (ref 82–98)
MONOCYTES # BLD AUTO: 0.62 THOUSAND/ΜL (ref 0.17–1.22)
MONOCYTES NFR BLD AUTO: 14 % (ref 4–12)
NEUTROPHILS # BLD AUTO: 2.84 THOUSANDS/ΜL (ref 1.85–7.62)
NEUTS SEG NFR BLD AUTO: 62 % (ref 43–75)
NRBC BLD AUTO-RTO: 0 /100 WBCS
PLATELET # BLD AUTO: 94 THOUSANDS/UL (ref 149–390)
PMV BLD AUTO: 10.4 FL (ref 8.9–12.7)
RBC # BLD AUTO: 2.93 MILLION/UL (ref 3.81–5.12)
RH BLD: POSITIVE
SPECIMEN EXPIRATION DATE: NORMAL
WBC # BLD AUTO: 4.55 THOUSAND/UL (ref 4.31–10.16)

## 2022-07-14 PROCEDURE — 86900 BLOOD TYPING SEROLOGIC ABO: CPT

## 2022-07-14 PROCEDURE — 36430 TRANSFUSION BLD/BLD COMPNT: CPT

## 2022-07-14 PROCEDURE — 86920 COMPATIBILITY TEST SPIN: CPT

## 2022-07-14 PROCEDURE — 86901 BLOOD TYPING SEROLOGIC RH(D): CPT

## 2022-07-14 PROCEDURE — 86850 RBC ANTIBODY SCREEN: CPT

## 2022-07-14 PROCEDURE — 85025 COMPLETE CBC W/AUTO DIFF WBC: CPT | Performed by: PHYSICIAN ASSISTANT

## 2022-07-14 PROCEDURE — P9016 RBC LEUKOCYTES REDUCED: HCPCS

## 2022-07-14 RX ORDER — DIPHENHYDRAMINE HCL 25 MG
25 TABLET ORAL ONCE
Status: CANCELLED | OUTPATIENT
Start: 2022-07-14

## 2022-07-14 RX ORDER — ACETAMINOPHEN 325 MG/1
650 TABLET ORAL ONCE
Status: CANCELLED | OUTPATIENT
Start: 2022-07-14

## 2022-07-14 RX ORDER — DIPHENHYDRAMINE HCL 25 MG
25 TABLET ORAL ONCE
Status: COMPLETED | OUTPATIENT
Start: 2022-07-14 | End: 2022-07-14

## 2022-07-14 RX ORDER — SODIUM CHLORIDE 9 MG/ML
20 INJECTION, SOLUTION INTRAVENOUS ONCE
Status: COMPLETED | OUTPATIENT
Start: 2022-07-14 | End: 2022-07-14

## 2022-07-14 RX ORDER — ACETAMINOPHEN 325 MG/1
650 TABLET ORAL ONCE
Status: COMPLETED | OUTPATIENT
Start: 2022-07-14 | End: 2022-07-14

## 2022-07-14 RX ORDER — SODIUM CHLORIDE 9 MG/ML
20 INJECTION, SOLUTION INTRAVENOUS ONCE
Status: CANCELLED | OUTPATIENT
Start: 2022-07-14

## 2022-07-14 RX ADMIN — SODIUM CHLORIDE 20 ML/HR: 0.9 INJECTION, SOLUTION INTRAVENOUS at 11:57

## 2022-07-14 RX ADMIN — ACETAMINOPHEN 650 MG: 325 TABLET ORAL at 11:56

## 2022-07-14 RX ADMIN — DIPHENHYDRAMINE HYDROCHLORIDE 25 MG: 25 TABLET ORAL at 11:56

## 2022-07-14 NOTE — PROGRESS NOTES
Pt tolerated 1 unit PRBCs with no adv reactions; pt aware of next appt; left unit ambulatory with steady gait

## 2022-07-14 NOTE — PROGRESS NOTES
Pt here for chemo; repeat CBC w/ diff drawn and sent; results reported to [facility]/ Dr Harriet Seals who ordered to hold chemo treatment today and transfuse 1 units PRBCs; type and screen sent; awaiting PRBCs; chemo rescheduled for next week; schedule given to pt

## 2022-07-15 LAB
ABO GROUP BLD BPU: NORMAL
BPU ID: NORMAL
CROSSMATCH: NORMAL
UNIT DISPENSE STATUS: NORMAL
UNIT PRODUCT CODE: NORMAL
UNIT PRODUCT VOLUME: 350 ML
UNIT RH: NORMAL

## 2022-07-19 LAB
ALBUMIN SERPL-MCNC: 4.2 G/DL (ref 3.8–4.8)
ALBUMIN/GLOB SERPL: 2.2 {RATIO} (ref 1.2–2.2)
ALP SERPL-CCNC: 114 IU/L (ref 44–121)
ALT SERPL-CCNC: 21 IU/L (ref 0–32)
AST SERPL-CCNC: 19 IU/L (ref 0–40)
BASOPHILS # BLD AUTO: 0 X10E3/UL (ref 0–0.2)
BASOPHILS NFR BLD AUTO: 1 %
BILIRUB SERPL-MCNC: 0.4 MG/DL (ref 0–1.2)
BUN SERPL-MCNC: 19 MG/DL (ref 8–27)
BUN/CREAT SERPL: 22 (ref 12–28)
CALCIUM SERPL-MCNC: 9.7 MG/DL (ref 8.7–10.3)
CHLORIDE SERPL-SCNC: 100 MMOL/L (ref 96–106)
CO2 SERPL-SCNC: 24 MMOL/L (ref 20–29)
CREAT SERPL-MCNC: 0.85 MG/DL (ref 0.57–1)
EGFR: 75 ML/MIN/1.73
EOSINOPHIL # BLD AUTO: 0.1 X10E3/UL (ref 0–0.4)
EOSINOPHIL NFR BLD AUTO: 1 %
ERYTHROCYTE [DISTWIDTH] IN BLOOD BY AUTOMATED COUNT: 17.8 % (ref 11.7–15.4)
GLOBULIN SER-MCNC: 1.9 G/DL (ref 1.5–4.5)
GLUCOSE SERPL-MCNC: 184 MG/DL (ref 65–99)
HCT VFR BLD AUTO: 32.5 % (ref 34–46.6)
HGB BLD-MCNC: 10.2 G/DL (ref 11.1–15.9)
IMM GRANULOCYTES # BLD: 0.1 X10E3/UL (ref 0–0.1)
IMM GRANULOCYTES NFR BLD: 1 %
LYMPHOCYTES # BLD AUTO: 1.4 X10E3/UL (ref 0.7–3.1)
LYMPHOCYTES NFR BLD AUTO: 24 %
MAGNESIUM SERPL-MCNC: 1.7 MG/DL (ref 1.6–2.3)
MCH RBC QN AUTO: 27.5 PG (ref 26.6–33)
MCHC RBC AUTO-ENTMCNC: 31.4 G/DL (ref 31.5–35.7)
MCV RBC AUTO: 88 FL (ref 79–97)
MONOCYTES # BLD AUTO: 0.7 X10E3/UL (ref 0.1–0.9)
MONOCYTES NFR BLD AUTO: 13 %
NEUTROPHILS # BLD AUTO: 3.4 X10E3/UL (ref 1.4–7)
NEUTROPHILS NFR BLD AUTO: 60 %
PLATELET # BLD AUTO: 119 X10E3/UL (ref 150–450)
POTASSIUM SERPL-SCNC: 4.1 MMOL/L (ref 3.5–5.2)
PROT SERPL-MCNC: 6.1 G/DL (ref 6–8.5)
RBC # BLD AUTO: 3.71 X10E6/UL (ref 3.77–5.28)
SODIUM SERPL-SCNC: 139 MMOL/L (ref 134–144)
WBC # BLD AUTO: 5.7 X10E3/UL (ref 3.4–10.8)

## 2022-07-20 NOTE — PROGRESS NOTES
Plan to dose-reduce carboplatin to AUC 5 with cycle 6 d/t thrombocytopenia and required treatment delay x 1 week

## 2022-07-21 ENCOUNTER — HOSPITAL ENCOUNTER (OUTPATIENT)
Dept: INFUSION CENTER | Facility: HOSPITAL | Age: 67
Discharge: HOME | End: 2022-07-21
Attending: OBSTETRICS & GYNECOLOGY
Payer: COMMERCIAL

## 2022-07-21 VITALS
RESPIRATION RATE: 16 BRPM | TEMPERATURE: 97.1 F | HEIGHT: 63 IN | WEIGHT: 228.62 LBS | OXYGEN SATURATION: 95 % | DIASTOLIC BLOOD PRESSURE: 64 MMHG | BODY MASS INDEX: 40.51 KG/M2 | HEART RATE: 94 BPM | SYSTOLIC BLOOD PRESSURE: 139 MMHG

## 2022-07-21 DIAGNOSIS — C54.1 ENDOMETRIAL CANCER (HCC): Primary | ICD-10-CM

## 2022-07-21 PROCEDURE — 96413 CHEMO IV INFUSION 1 HR: CPT

## 2022-07-21 PROCEDURE — 96375 TX/PRO/DX INJ NEW DRUG ADDON: CPT

## 2022-07-21 PROCEDURE — 96415 CHEMO IV INFUSION ADDL HR: CPT

## 2022-07-21 PROCEDURE — 96417 CHEMO IV INFUS EACH ADDL SEQ: CPT

## 2022-07-21 PROCEDURE — 96367 TX/PROPH/DG ADDL SEQ IV INF: CPT

## 2022-07-21 RX ORDER — SODIUM CHLORIDE 9 MG/ML
20 INJECTION, SOLUTION INTRAVENOUS ONCE
Status: COMPLETED | OUTPATIENT
Start: 2022-07-21 | End: 2022-07-21

## 2022-07-21 RX ORDER — PALONOSETRON 0.05 MG/ML
0.25 INJECTION, SOLUTION INTRAVENOUS ONCE
Status: COMPLETED | OUTPATIENT
Start: 2022-07-21 | End: 2022-07-21

## 2022-07-21 RX ADMIN — PACLITAXEL 276.6 MG: 6 INJECTION, SOLUTION INTRAVENOUS at 10:56

## 2022-07-21 RX ADMIN — FOSAPREPITANT 150 MG: 150 INJECTION, POWDER, LYOPHILIZED, FOR SOLUTION INTRAVENOUS at 10:11

## 2022-07-21 RX ADMIN — SODIUM CHLORIDE 20 ML/HR: 0.9 INJECTION, SOLUTION INTRAVENOUS at 08:46

## 2022-07-21 RX ADMIN — CARBOPLATIN 490 MG: 10 INJECTION, SOLUTION INTRAVENOUS at 14:01

## 2022-07-21 RX ADMIN — DIPHENHYDRAMINE HYDROCHLORIDE 25 MG: 50 INJECTION, SOLUTION INTRAMUSCULAR; INTRAVENOUS at 09:14

## 2022-07-21 RX ADMIN — DEXAMETHASONE SODIUM PHOSPHATE 20 MG: 10 INJECTION, SOLUTION INTRAMUSCULAR; INTRAVENOUS at 08:47

## 2022-07-21 RX ADMIN — FAMOTIDINE 20 MG: 10 INJECTION INTRAVENOUS at 09:39

## 2022-07-21 RX ADMIN — PALONOSETRON 0.25 MG: 0.05 INJECTION, SOLUTION INTRAVENOUS at 10:07

## 2022-07-21 RX ADMIN — BEVACIZUMAB 1600 MG: 400 INJECTION, SOLUTION INTRAVENOUS at 15:12

## 2022-07-26 ENCOUNTER — TELEPHONE (OUTPATIENT)
Dept: GYNECOLOGIC ONCOLOGY | Facility: CLINIC | Age: 67
End: 2022-07-26

## 2022-07-26 DIAGNOSIS — C54.1 ENDOMETRIAL CANCER (HCC): ICD-10-CM

## 2022-07-26 RX ORDER — ONDANSETRON HYDROCHLORIDE 8 MG/1
8 TABLET, FILM COATED ORAL EVERY 8 HOURS PRN
Qty: 20 TABLET | Refills: 1 | Status: SHIPPED | OUTPATIENT
Start: 2022-07-26

## 2022-07-26 NOTE — TELEPHONE ENCOUNTER
Patient called [facility] requesting medication refill  Zofran 8mg  Pharmacy confirmed    Please call her once plced

## 2022-07-27 LAB
ALBUMIN SERPL-MCNC: 3.9 G/DL (ref 3.8–4.8)
ALBUMIN/GLOB SERPL: 2 {RATIO} (ref 1.2–2.2)
ALP SERPL-CCNC: 107 IU/L (ref 44–121)
ALT SERPL-CCNC: 22 IU/L (ref 0–32)
AST SERPL-CCNC: 15 IU/L (ref 0–40)
BASOPHILS # BLD AUTO: 0 X10E3/UL (ref 0–0.2)
BASOPHILS NFR BLD AUTO: 0 %
BILIRUB SERPL-MCNC: 0.3 MG/DL (ref 0–1.2)
BUN SERPL-MCNC: 15 MG/DL (ref 8–27)
BUN/CREAT SERPL: 19 (ref 12–28)
CALCIUM SERPL-MCNC: 9 MG/DL (ref 8.7–10.3)
CHLORIDE SERPL-SCNC: 99 MMOL/L (ref 96–106)
CO2 SERPL-SCNC: 25 MMOL/L (ref 20–29)
CREAT SERPL-MCNC: 0.8 MG/DL (ref 0.57–1)
EGFR: 80 ML/MIN/1.73
EOSINOPHIL # BLD AUTO: 0.1 X10E3/UL (ref 0–0.4)
EOSINOPHIL NFR BLD AUTO: 2 %
ERYTHROCYTE [DISTWIDTH] IN BLOOD BY AUTOMATED COUNT: 17 % (ref 11.7–15.4)
GLOBULIN SER-MCNC: 2 G/DL (ref 1.5–4.5)
GLUCOSE SERPL-MCNC: 214 MG/DL (ref 65–99)
HCT VFR BLD AUTO: 31.8 % (ref 34–46.6)
HGB BLD-MCNC: 10 G/DL (ref 11.1–15.9)
IMM GRANULOCYTES # BLD: 0 X10E3/UL (ref 0–0.1)
IMM GRANULOCYTES NFR BLD: 0 %
LYMPHOCYTES # BLD AUTO: 0.8 X10E3/UL (ref 0.7–3.1)
LYMPHOCYTES NFR BLD AUTO: 25 %
MAGNESIUM SERPL-MCNC: 1.9 MG/DL (ref 1.6–2.3)
MCH RBC QN AUTO: 27.9 PG (ref 26.6–33)
MCHC RBC AUTO-ENTMCNC: 31.4 G/DL (ref 31.5–35.7)
MCV RBC AUTO: 89 FL (ref 79–97)
MONOCYTES # BLD AUTO: 0.2 X10E3/UL (ref 0.1–0.9)
MONOCYTES NFR BLD AUTO: 5 %
NEUTROPHILS # BLD AUTO: 2.2 X10E3/UL (ref 1.4–7)
NEUTROPHILS NFR BLD AUTO: 68 %
PLATELET # BLD AUTO: 128 X10E3/UL (ref 150–450)
POTASSIUM SERPL-SCNC: 4.1 MMOL/L (ref 3.5–5.2)
PROT SERPL-MCNC: 5.9 G/DL (ref 6–8.5)
RBC # BLD AUTO: 3.59 X10E6/UL (ref 3.77–5.28)
SODIUM SERPL-SCNC: 138 MMOL/L (ref 134–144)
WBC # BLD AUTO: 3.3 X10E3/UL (ref 3.4–10.8)

## 2022-08-02 LAB
ALBUMIN SERPL-MCNC: 3.7 G/DL (ref 3.8–4.8)
ALBUMIN/GLOB SERPL: 1.9 {RATIO} (ref 1.2–2.2)
ALP SERPL-CCNC: 97 IU/L (ref 44–121)
ALT SERPL-CCNC: 22 IU/L (ref 0–32)
AST SERPL-CCNC: 19 IU/L (ref 0–40)
BASOPHILS # BLD AUTO: 0 X10E3/UL (ref 0–0.2)
BASOPHILS NFR BLD AUTO: 0 %
BILIRUB SERPL-MCNC: 0.2 MG/DL (ref 0–1.2)
BUN SERPL-MCNC: 14 MG/DL (ref 8–27)
BUN/CREAT SERPL: 18 (ref 12–28)
CALCIUM SERPL-MCNC: 9.1 MG/DL (ref 8.7–10.3)
CHLORIDE SERPL-SCNC: 100 MMOL/L (ref 96–106)
CO2 SERPL-SCNC: 25 MMOL/L (ref 20–29)
CREAT SERPL-MCNC: 0.77 MG/DL (ref 0.57–1)
EGFR: 84 ML/MIN/1.73
EOSINOPHIL # BLD AUTO: 0.1 X10E3/UL (ref 0–0.4)
EOSINOPHIL NFR BLD AUTO: 3 %
ERYTHROCYTE [DISTWIDTH] IN BLOOD BY AUTOMATED COUNT: 17.7 % (ref 11.7–15.4)
GLOBULIN SER-MCNC: 1.9 G/DL (ref 1.5–4.5)
GLUCOSE SERPL-MCNC: 168 MG/DL (ref 65–99)
HCT VFR BLD AUTO: 29.8 % (ref 34–46.6)
HGB BLD-MCNC: 9.2 G/DL (ref 11.1–15.9)
IMM GRANULOCYTES # BLD: 0 X10E3/UL (ref 0–0.1)
IMM GRANULOCYTES NFR BLD: 0 %
LYMPHOCYTES # BLD AUTO: 1 X10E3/UL (ref 0.7–3.1)
LYMPHOCYTES NFR BLD AUTO: 35 %
MAGNESIUM SERPL-MCNC: 1.5 MG/DL (ref 1.6–2.3)
MCH RBC QN AUTO: 27.6 PG (ref 26.6–33)
MCHC RBC AUTO-ENTMCNC: 30.9 G/DL (ref 31.5–35.7)
MCV RBC AUTO: 90 FL (ref 79–97)
MONOCYTES # BLD AUTO: 0.5 X10E3/UL (ref 0.1–0.9)
MONOCYTES NFR BLD AUTO: 17 %
NEUTROPHILS # BLD AUTO: 1.3 X10E3/UL (ref 1.4–7)
NEUTROPHILS NFR BLD AUTO: 45 %
PLATELET # BLD AUTO: 116 X10E3/UL (ref 150–450)
POTASSIUM SERPL-SCNC: 4.1 MMOL/L (ref 3.5–5.2)
PROT SERPL-MCNC: 5.6 G/DL (ref 6–8.5)
RBC # BLD AUTO: 3.33 X10E6/UL (ref 3.77–5.28)
SODIUM SERPL-SCNC: 139 MMOL/L (ref 134–144)
WBC # BLD AUTO: 2.8 X10E3/UL (ref 3.4–10.8)

## 2022-08-04 ENCOUNTER — OFFICE VISIT (OUTPATIENT)
Dept: GYNECOLOGIC ONCOLOGY | Facility: HOSPITAL | Age: 67
End: 2022-08-04
Payer: COMMERCIAL

## 2022-08-04 VITALS
WEIGHT: 226.8 LBS | SYSTOLIC BLOOD PRESSURE: 130 MMHG | HEART RATE: 89 BPM | DIASTOLIC BLOOD PRESSURE: 66 MMHG | OXYGEN SATURATION: 94 % | RESPIRATION RATE: 17 BRPM | HEIGHT: 63 IN | TEMPERATURE: 96.4 F | BODY MASS INDEX: 40.18 KG/M2

## 2022-08-04 DIAGNOSIS — C54.1 ENDOMETRIAL CANCER (HCC): Primary | ICD-10-CM

## 2022-08-04 DIAGNOSIS — D69.59 CHEMOTHERAPY-INDUCED THROMBOCYTOPENIA: ICD-10-CM

## 2022-08-04 DIAGNOSIS — T45.1X5A CHEMOTHERAPY-INDUCED THROMBOCYTOPENIA: ICD-10-CM

## 2022-08-04 PROCEDURE — 99215 OFFICE O/P EST HI 40 MIN: CPT | Performed by: PHYSICIAN ASSISTANT

## 2022-08-04 RX ORDER — LANOLIN ALCOHOL/MO/W.PET/CERES
400 CREAM (GRAM) TOPICAL DAILY
COMMUNITY
Start: 2022-07-22 | End: 2022-09-13

## 2022-08-04 NOTE — PROGRESS NOTES
Assessment/Plan:    Problem List Items Addressed This Visit        Hematopoietic and Hemostatic    Chemotherapy-induced thrombocytopenia     Following cycle 5, requiring 1 week delay  Carbplatin dose-reduce to AUC 5  Genitourinary    Endometrial cancer ([facility] ) - Primary     67-year-old with stage IVB high-grade endometrial cancer with liver, retroperitoneal lymph node and lung metastatic disease s/p 4 cycles neoadjuvant chemotherapy with taxol and carboplatin as well as attempted surgical resection, who underwent robotic-assisted BSO and lysis of adhesions on 5/31/22  Hysterectomy was aborted due to adhesive disease as well as parametrial/cervical involvement  She is receiving taxol 135 mg/m2, carboplatin AUC 5 and avastin 15 mg/kg every 21 days  She is tolerating treatment well       Continue with cycle 7 of treatment as planned as long as her metabolic and hematologic parameters are adequate  Plan for CT imaging after completion of cycle 7  Return to the office as per her chemotherapy calendar  Relevant Orders    CT chest abdomen pelvis w contrast            CHIEF COMPLAINT:   Pre-chemotherapy evaluation    Problem:  Cancer Staging  Endometrial cancer [facility])  Staging form: Corpus Uteri - Carcinoma, AJCC 8th Edition  - Clinical: FIGO Stage IVB (cM1) - Signed by Lynnette Sanford MD on 2/17/2022        Previous therapy:  Oncology History   [facility]   1/26/2022 Initial Diagnosis    [facility] )     2/17/2022 -  Cancer Staged    Staging form: Corpus Uteri - Carcinoma, AJCC 8th Edition  - Clinical: FIGO Stage IVB (cM1) - Signed by Lynnette Sanford MD on 2/17/2022  Histologic grade (G): G3  Histologic grading system: 3 grade system       2/24/2022 -  Chemotherapy    Taxol 175 mg/m2 and carboplatin AUC 6 every 21 days  Taxol dose-reduced to 135 mg/m2 with cycle 2 due to arthralgias/myalgias   She has received 4 cycles in the neoadjuvant setting, and 2 following aborting debulking  Carboplatin dose-reduced to AUC 5 with cycle 5 d/t thrombocytopenia  3/11/2022 Genomic Testing    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, ER/DC positive, microsatellite stable  No other targeted therapy opportunities  5/31/2022 Surgery    Robotic-assisted BSO and lysis of adhesions  Hysterectomy aborted due to parametrial/cervical involvement and adhesive disease  Patient ID: Vasquez Mcclendon is a 75 y o  female  wSho presents to the office for pre-chemotherapy evaluation  Overall, she is tolerating treatment well and without new complaints  The patient has been afebrile  Bowel/bladder function stable  She denies n/v/abdominal pain  Appetite is appropriate  The patient notes intermittent numbness in her fingertips which is not affecting her ADLs  She denies headaches, nose bleeds or high blood pressure  CBC/Diff, CMP, Mg from 8/1/22 reviewed  The following portions of the patient's history were reviewed and updated as appropriate: allergies, current medications, past medical history, past surgical history and problem list     Review of Systems   Constitutional: Positive for fatigue  Negative for fever  HENT: Negative  Eyes: Negative  Respiratory: Negative  Cardiovascular: Negative  Gastrointestinal: Negative  Genitourinary: Negative  Musculoskeletal: Negative  Skin: Negative  Neurological: Negative  Psychiatric/Behavioral: Negative  Current Outpatient Medications   Medication Sig Dispense Refill    acetaminophen (TYLENOL) 650 mg CR tablet Take 1,300 mg by mouth in the morning and 1,300 mg before bedtime        Bacillus Coagulans-Inulin (Probiotic) 1-250 BILLION-MG CAPS as directed      BD Pen Needle Yoana 2nd Gen 32G X 4 MM MISC 2 (two) times a day      Contour Next Test test strip 2 (two) times a day Test      fexofenadine (ALLEGRA) 180 MG tablet every 24 hours      furosemide (LASIX) 40 mg tablet Take 1 tablet by mouth daily  gabapentin (NEURONTIN) 300 mg capsule Take 300 mg by mouth 3 (three) times a day      ibuprofen (MOTRIN) 200 mg tablet Take 400 mg by mouth every 6 (six) hours as needed for mild pain      lidocaine-prilocaine (EMLA) cream Apply to port site 30 min prior to labs/chemo 30 g 2    lisinopril (ZESTRIL) 20 mg tablet Take 1 tablet by mouth daily      LORazepam (ATIVAN) 0 5 mg tablet Take 0 5 mg by mouth in the morning and 0 5 mg in the evening   magnesium Oxide (MAG-OX) 400 mg TABS Take 400 mg by mouth daily      magnesium oxide (MAG-OX) 400 mg Take 400 mg by mouth in the morning   metFORMIN (GLUCOPHAGE) 1000 MG tablet Take 1 tablet by mouth 2 (two) times a day with meals      Microlet Lancets MISC 2 (two) times a day Test      omeprazole (PriLOSEC) 20 mg delayed release capsule every 24 hours      ondansetron (ZOFRAN) 8 mg tablet Take 1 tablet (8 mg total) by mouth every 8 (eight) hours as needed for nausea or vomiting 20 tablet 1    oxyCODONE (Roxicodone) 5 immediate release tablet Take 1 tablet (5 mg total) by mouth every 6 (six) hours as needed for moderate pain Max Daily Amount: 20 mg 20 tablet 0    Rosuvastatin Calcium 20 MG CPSP Take 1 tablet by mouth daily      sertraline (ZOLOFT) 50 mg tablet Take 50 mg by mouth daily      sitaGLIPtin (JANUVIA) 100 mg tablet Take 1 tablet by mouth daily      Toujeo SoloStar 300 units/mL CONCENTRATED U-300 injection pen (1-unit dial) every 12 (twelve) hours 34 units in the am and 36 units at night       No current facility-administered medications for this visit  Objective:    Blood pressure 130/66, pulse 89, temperature (!) 96 4 °F (35 8 °C), temperature source Tympanic, resp  rate 17, height 5' 3 31" (1 608 m), weight 103 kg (226 lb 12 8 oz), SpO2 94 %  Body mass index is 39 78 kg/m²  Body surface area is 2 05 meters squared  Physical Exam  Vitals reviewed  Constitutional:       General: She is not in acute distress       Appearance: Normal appearance  She is not ill-appearing  HENT:      Head: Normocephalic and atraumatic  Mouth/Throat:      Mouth: Mucous membranes are moist    Eyes:      General: No scleral icterus  Right eye: No discharge  Left eye: No discharge  Conjunctiva/sclera: Conjunctivae normal    Pulmonary:      Effort: Pulmonary effort is normal    Musculoskeletal:      Right lower leg: No edema  Left lower leg: No edema  Skin:     General: Skin is warm and dry  Coloration: Skin is not jaundiced  Findings: No rash  Neurological:      General: No focal deficit present  Mental Status: She is alert and oriented to person, place, and time  Cranial Nerves: No cranial nerve deficit  Sensory: No sensory deficit  Motor: No weakness  Gait: Gait normal    Psychiatric:         Mood and Affect: Mood normal          Behavior: Behavior normal          Thought Content: Thought content normal          Judgment: Judgment normal      Performance status is zero         Lab Results   Component Value Date     14 3 07/11/2022     Lab Results   Component Value Date    K 4 1 08/01/2022     08/01/2022    CO2 25 08/01/2022    BUN 14 08/01/2022    CREATININE 0 77 08/01/2022    CALCIUM 8 5 06/01/2022    AST 19 08/01/2022    ALT 22 08/01/2022    EGFR 84 08/01/2022     Lab Results   Component Value Date    WBC 2 8 (L) 08/01/2022    HGB 9 2 (L) 08/01/2022    HCT 29 8 (L) 08/01/2022    MCV 90 08/01/2022     (L) 08/01/2022     Lab Results   Component Value Date    NEUTROABS 1 3 (L) 08/01/2022        Trend:  Lab Results   Component Value Date     14 3 07/11/2022     12 2 05/10/2022     13 6 04/25/2022     10 9 04/04/2022     11 4 03/15/2022     10 9 02/28/2022

## 2022-08-05 PROBLEM — T45.1X5A CHEMOTHERAPY-INDUCED THROMBOCYTOPENIA: Status: ACTIVE | Noted: 2022-08-05

## 2022-08-05 PROBLEM — D69.59 CHEMOTHERAPY-INDUCED THROMBOCYTOPENIA: Status: ACTIVE | Noted: 2022-08-05

## 2022-08-05 NOTE — ASSESSMENT & PLAN NOTE
79-year-old with stage IVB high-grade endometrial cancer with liver, retroperitoneal lymph node and lung metastatic disease s/p 4 cycles neoadjuvant chemotherapy with taxol and carboplatin as well as attempted surgical resection, who underwent robotic-assisted BSO and lysis of adhesions on 5/31/22  Hysterectomy was aborted due to adhesive disease as well as parametrial/cervical involvement  She is receiving taxol 135 mg/m2, carboplatin AUC 5 and avastin 15 mg/kg every 21 days  She is tolerating treatment well       Continue with cycle 7 of treatment as planned as long as her metabolic and hematologic parameters are adequate  Plan for CT imaging after completion of cycle 7  Return to the office as per her chemotherapy calendar

## 2022-08-08 LAB
CREAT UR-MCNC: 200.8 MG/DL
PROT UR-MCNC: 57.2 MG/DL
PROT/CREAT UR: 285 MG/G CREAT (ref 0–200)

## 2022-08-10 RX ORDER — SODIUM CHLORIDE 9 MG/ML
20 INJECTION, SOLUTION INTRAVENOUS ONCE
Status: CANCELLED | OUTPATIENT
Start: 2022-08-11

## 2022-08-10 RX ORDER — MAGNESIUM SULFATE HEPTAHYDRATE 40 MG/ML
2 INJECTION, SOLUTION INTRAVENOUS ONCE
Status: CANCELLED | OUTPATIENT
Start: 2022-08-11

## 2022-08-10 RX ORDER — PALONOSETRON 0.05 MG/ML
0.25 INJECTION, SOLUTION INTRAVENOUS ONCE
Status: CANCELLED | OUTPATIENT
Start: 2022-08-11

## 2022-08-11 ENCOUNTER — HOSPITAL ENCOUNTER (OUTPATIENT)
Dept: INFUSION CENTER | Facility: HOSPITAL | Age: 67
Discharge: HOME | End: 2022-08-11
Attending: OBSTETRICS & GYNECOLOGY
Payer: COMMERCIAL

## 2022-08-11 VITALS
RESPIRATION RATE: 16 BRPM | HEART RATE: 91 BPM | SYSTOLIC BLOOD PRESSURE: 140 MMHG | TEMPERATURE: 97.9 F | HEIGHT: 63 IN | OXYGEN SATURATION: 96 % | WEIGHT: 228.4 LBS | BODY MASS INDEX: 40.47 KG/M2 | DIASTOLIC BLOOD PRESSURE: 70 MMHG

## 2022-08-11 DIAGNOSIS — C54.1 ENDOMETRIAL CANCER (HCC): Primary | ICD-10-CM

## 2022-08-11 PROCEDURE — 96415 CHEMO IV INFUSION ADDL HR: CPT

## 2022-08-11 PROCEDURE — 96368 THER/DIAG CONCURRENT INF: CPT

## 2022-08-11 PROCEDURE — 96417 CHEMO IV INFUS EACH ADDL SEQ: CPT

## 2022-08-11 PROCEDURE — 96375 TX/PRO/DX INJ NEW DRUG ADDON: CPT

## 2022-08-11 PROCEDURE — 96413 CHEMO IV INFUSION 1 HR: CPT

## 2022-08-11 PROCEDURE — 96367 TX/PROPH/DG ADDL SEQ IV INF: CPT

## 2022-08-11 RX ORDER — SODIUM CHLORIDE 9 MG/ML
20 INJECTION, SOLUTION INTRAVENOUS ONCE
Status: COMPLETED | OUTPATIENT
Start: 2022-08-11 | End: 2022-08-11

## 2022-08-11 RX ORDER — MAGNESIUM SULFATE HEPTAHYDRATE 40 MG/ML
2 INJECTION, SOLUTION INTRAVENOUS ONCE
Status: COMPLETED | OUTPATIENT
Start: 2022-08-11 | End: 2022-08-11

## 2022-08-11 RX ORDER — PALONOSETRON 0.05 MG/ML
0.25 INJECTION, SOLUTION INTRAVENOUS ONCE
Status: COMPLETED | OUTPATIENT
Start: 2022-08-11 | End: 2022-08-11

## 2022-08-11 RX ADMIN — SODIUM CHLORIDE 20 ML/HR: 0.9 INJECTION, SOLUTION INTRAVENOUS at 08:49

## 2022-08-11 RX ADMIN — DIPHENHYDRAMINE HYDROCHLORIDE 25 MG: 50 INJECTION, SOLUTION INTRAMUSCULAR; INTRAVENOUS at 09:20

## 2022-08-11 RX ADMIN — DEXAMETHASONE SODIUM PHOSPHATE 20 MG: 10 INJECTION, SOLUTION INTRAMUSCULAR; INTRAVENOUS at 08:49

## 2022-08-11 RX ADMIN — MAGNESIUM SULFATE HEPTAHYDRATE 2 G: 40 INJECTION, SOLUTION INTRAVENOUS at 11:01

## 2022-08-11 RX ADMIN — PALONOSETRON 0.25 MG: 0.05 INJECTION, SOLUTION INTRAVENOUS at 08:49

## 2022-08-11 RX ADMIN — CARBOPLATIN 503.5 MG: 10 INJECTION, SOLUTION INTRAVENOUS at 13:50

## 2022-08-11 RX ADMIN — FOSAPREPITANT 150 MG: 150 INJECTION, POWDER, LYOPHILIZED, FOR SOLUTION INTRAVENOUS at 10:03

## 2022-08-11 RX ADMIN — FAMOTIDINE 20 MG: 10 INJECTION INTRAVENOUS at 09:41

## 2022-08-11 RX ADMIN — BEVACIZUMAB 1600 MG: 400 INJECTION, SOLUTION INTRAVENOUS at 14:59

## 2022-08-11 RX ADMIN — PACLITAXEL 276.6 MG: 6 INJECTION, SOLUTION INTRAVENOUS at 10:47

## 2022-08-12 ENCOUNTER — TELEPHONE (OUTPATIENT)
Dept: GYNECOLOGIC ONCOLOGY | Facility: CLINIC | Age: 67
End: 2022-08-12

## 2022-08-12 DIAGNOSIS — C54.1 ENDOMETRIAL CANCER (HCC): Primary | ICD-10-CM

## 2022-08-12 NOTE — TELEPHONE ENCOUNTER
Patient will start going to [facility], she wanted to know if new lab scripts need to be ordered for [facility]   She will need it for Monday

## 2022-08-15 ENCOUNTER — APPOINTMENT (OUTPATIENT)
Dept: LAB | Facility: HOSPITAL | Age: 67
End: 2022-08-15
Payer: COMMERCIAL

## 2022-08-15 LAB
ALBUMIN SERPL BCP-MCNC: 2.8 G/DL (ref 3.5–5)
ALP SERPL-CCNC: 90 U/L (ref 46–116)
ALT SERPL W P-5'-P-CCNC: 31 U/L (ref 12–78)
ANION GAP SERPL CALCULATED.3IONS-SCNC: 7 MMOL/L (ref 4–13)
AST SERPL W P-5'-P-CCNC: 22 U/L (ref 5–45)
BASOPHILS # BLD AUTO: 0 THOUSANDS/ΜL (ref 0–0.1)
BASOPHILS NFR BLD AUTO: 0 % (ref 0–1)
BILIRUB SERPL-MCNC: 0.34 MG/DL (ref 0.2–1)
BUN SERPL-MCNC: 17 MG/DL (ref 5–25)
CALCIUM ALBUM COR SERPL-MCNC: 9.4 MG/DL (ref 8.3–10.1)
CALCIUM SERPL-MCNC: 8.4 MG/DL (ref 8.3–10.1)
CHLORIDE SERPL-SCNC: 105 MMOL/L (ref 96–108)
CO2 SERPL-SCNC: 26 MMOL/L (ref 21–32)
CREAT SERPL-MCNC: 0.96 MG/DL (ref 0.6–1.3)
EOSINOPHIL # BLD AUTO: 0.03 THOUSAND/ΜL (ref 0–0.61)
EOSINOPHIL NFR BLD AUTO: 1 % (ref 0–6)
ERYTHROCYTE [DISTWIDTH] IN BLOOD BY AUTOMATED COUNT: 17.5 % (ref 11.6–15.1)
GFR SERPL CREATININE-BSD FRML MDRD: 60 ML/MIN/1.73SQ M
GLUCOSE P FAST SERPL-MCNC: 273 MG/DL (ref 65–99)
HCT VFR BLD AUTO: 30.5 % (ref 34.8–46.1)
HGB BLD-MCNC: 9.1 G/DL (ref 11.5–15.4)
IMM GRANULOCYTES # BLD AUTO: 0.01 THOUSAND/UL (ref 0–0.2)
IMM GRANULOCYTES NFR BLD AUTO: 0 % (ref 0–2)
LYMPHOCYTES # BLD AUTO: 0.82 THOUSANDS/ΜL (ref 0.6–4.47)
LYMPHOCYTES NFR BLD AUTO: 28 % (ref 14–44)
MAGNESIUM SERPL-MCNC: 1.8 MG/DL (ref 1.6–2.6)
MCH RBC QN AUTO: 28 PG (ref 26.8–34.3)
MCHC RBC AUTO-ENTMCNC: 29.8 G/DL (ref 31.4–37.4)
MCV RBC AUTO: 94 FL (ref 82–98)
MONOCYTES # BLD AUTO: 0.21 THOUSAND/ΜL (ref 0.17–1.22)
MONOCYTES NFR BLD AUTO: 7 % (ref 4–12)
NEUTROPHILS # BLD AUTO: 1.87 THOUSANDS/ΜL (ref 1.85–7.62)
NEUTS SEG NFR BLD AUTO: 64 % (ref 43–75)
NRBC BLD AUTO-RTO: 0 /100 WBCS
PLATELET # BLD AUTO: 104 THOUSANDS/UL (ref 149–390)
PMV BLD AUTO: 11.5 FL (ref 8.9–12.7)
POTASSIUM SERPL-SCNC: 3.7 MMOL/L (ref 3.5–5.3)
PROT SERPL-MCNC: 6.2 G/DL (ref 6.4–8.4)
RBC # BLD AUTO: 3.25 MILLION/UL (ref 3.81–5.12)
SODIUM SERPL-SCNC: 138 MMOL/L (ref 135–147)
WBC # BLD AUTO: 2.94 THOUSAND/UL (ref 4.31–10.16)

## 2022-08-15 PROCEDURE — 85025 COMPLETE CBC W/AUTO DIFF WBC: CPT | Performed by: PHYSICIAN ASSISTANT

## 2022-08-15 PROCEDURE — 83735 ASSAY OF MAGNESIUM: CPT | Performed by: PHYSICIAN ASSISTANT

## 2022-08-15 PROCEDURE — 80053 COMPREHEN METABOLIC PANEL: CPT | Performed by: PHYSICIAN ASSISTANT

## 2022-08-15 PROCEDURE — 36415 COLL VENOUS BLD VENIPUNCTURE: CPT | Performed by: PHYSICIAN ASSISTANT

## 2022-08-22 ENCOUNTER — HOSPITAL ENCOUNTER (OUTPATIENT)
Dept: CT IMAGING | Facility: HOSPITAL | Age: 67
Discharge: HOME | End: 2022-08-22
Payer: COMMERCIAL

## 2022-08-22 ENCOUNTER — APPOINTMENT (OUTPATIENT)
Dept: LAB | Facility: HOSPITAL | Age: 67
End: 2022-08-22
Payer: COMMERCIAL

## 2022-08-22 DIAGNOSIS — C54.1 ENDOMETRIAL CANCER (HCC): ICD-10-CM

## 2022-08-22 PROCEDURE — 71260 CT THORAX DX C+: CPT

## 2022-08-22 PROCEDURE — G1004 CDSM NDSC: HCPCS

## 2022-08-22 PROCEDURE — 74177 CT ABD & PELVIS W/CONTRAST: CPT

## 2022-08-22 PROCEDURE — 36415 COLL VENOUS BLD VENIPUNCTURE: CPT | Performed by: PHYSICIAN ASSISTANT

## 2022-08-22 RX ADMIN — IOHEXOL 64 ML: 350 INJECTION, SOLUTION INTRAVENOUS at 12:57

## 2022-08-25 ENCOUNTER — OFFICE VISIT (OUTPATIENT)
Dept: GYNECOLOGIC ONCOLOGY | Facility: HOSPITAL | Age: 67
End: 2022-08-25
Payer: COMMERCIAL

## 2022-08-25 ENCOUNTER — DOCUMENTATION (OUTPATIENT)
Dept: HEMATOLOGY ONCOLOGY | Facility: CLINIC | Age: 67
End: 2022-08-25

## 2022-08-25 VITALS
BODY MASS INDEX: 41.46 KG/M2 | HEART RATE: 87 BPM | RESPIRATION RATE: 16 BRPM | TEMPERATURE: 98.7 F | OXYGEN SATURATION: 98 % | HEIGHT: 63 IN | DIASTOLIC BLOOD PRESSURE: 88 MMHG | SYSTOLIC BLOOD PRESSURE: 130 MMHG | WEIGHT: 234 LBS

## 2022-08-25 DIAGNOSIS — C54.1 ENDOMETRIAL CANCER (HCC): Primary | ICD-10-CM

## 2022-08-25 PROCEDURE — 99215 OFFICE O/P EST HI 40 MIN: CPT | Performed by: OBSTETRICS & GYNECOLOGY

## 2022-08-25 NOTE — PROGRESS NOTES
Assessment/Plan:    Problem List Items Addressed This Visit        Genitourinary    Endometrial cancer ([facility] [address] ) - Primary     65-year-old with stage IV B high-grade endometrial cancer, liver, retroperitoneal lymph node, lung metastatic disease who has received 7 cycles of carboplatin, Taxol and 3 cycles of bevacizumab  I reviewed her CBC, CMP, urine protein to creatinine ratio, CT of chest abdomen pelvis  She has disease progression in the right external iliac lymph nodes with stable disease elsewhere  She has pancytopenia  Her performance status is 2   1  I reviewed the CT imaging results with her and her   She understands that there is evidence of disease progression  2  I discussed stopping palliative carboplatin, Taxol, Avastin  Reviewed treatment options including immunotherapy based regimen, additional cytotoxic chemotherapy, no further tumor directed therapy  3  I discussed the risks and benefits of pembrolizumab at 200 mg IV every 3 weeks to be given along with lenvatinib at 10 mg orally daily  She understands that treatment is palliative but that this regimen has a fairly high response rate  We reviewed the possible side effects of this regimen in detail including thyroiditis, pancreatitis, pneumonitis, colitis, bowel perforation, hypertension, bleeding, blood clots, skin rashes, mouth sores, nausea, vomiting  She understands the risks and benefits of treatment and agrees to proceed as outlined  Consent for treatment as well as for potential blood transfusion was obtained by me in the office  4  I offered her palliative transfusion  Although she is fatigued, it is not as severe as prior and she will hold off for now  Will continue to trend labs prior to initiation of Keytruda and lenvatinib             Relevant Orders    Infusion Calculated Appointment Request    Infusion Calculated Appointment Request    Infusion Calculated Appointment Request    Infusion Calculated Appointment Request            CHIEF COMPLAINT:  Treatment discussion       Problem:  Cancer Staging  Endometrial cancer [facility])  Staging form: Corpus Uteri - Carcinoma, AJCC 8th Edition  - Clinical: FIGO Stage IVB (cM1) - Signed by Daniel Abdi MD on 2/17/2022        Previous therapy:  Oncology History   Endometrial cancer ([facility] [place] [address] )   1/26/2022 Initial Diagnosis    Endometrial cancer ([facility] [place] [address] )     2/17/2022 -  Cancer Staged    Staging form: Corpus Uteri - Carcinoma, AJCC 8th Edition  - Clinical: FIGO Stage IVB (cM1) - Signed by Daniel Abdi MD on 2/17/2022  Histologic grade (G): G3  Histologic grading system: 3 grade system       2/24/2022 -  Chemotherapy    Taxol 175 mg/m2 and carboplatin AUC 6 every 21 days  Taxol dose-reduced to 135 mg/m2 with cycle 2 due to arthralgias/myalgias  She has received 4 cycles in the neoadjuvant setting, and 2 following aborting debulking  Carboplatin dose-reduced to AUC 5 with cycle 5 d/t thrombocytopenia  3/11/2022 Genomic Testing    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, ER/GA positive, microsatellite stable  No other targeted therapy opportunities  5/31/2022 Surgery    Robotic-assisted BSO and lysis of adhesions  Hysterectomy aborted due to parametrial/cervical involvement and adhesive disease  9/15/2022 -  Chemotherapy    pembrolizumab (KEYTRUDA) IVPB, 200 mg, Intravenous, Once, 0 of 4 cycles           Patient ID: Rosalba Barreto is a 75 y o  female  Returns for treatment discussion  She has been on palliative carboplatin, Taxol, Avastin and is here prior to cycle 8  Labs from 8/22/2022 revealed total white blood cell count 3 46, hemoglobin 8 4 grams/deciliter, platelet count 75 K  CMP was normal   Urine protein to creatinine ratio was also normal at less than 1   CT of chest abdomen pelvis on 8/22/2022 revealed unchanged lung and mediastinal lymph node metastases  There were stable liver metastases    There was an increase in the right external iliac lymph node measuring 2 3 x 2 cm from prior of 0 7 x 0 6 cm  She is fatigued  She is spending more time in her lounge chair  She does not have vaginal bleeding  Her appetite is minimized  She is able to get a good breakfast   No vaginal bleeding  No other interval change in medications or medical history since her last visit  The following portions of the patient's history were reviewed and updated as appropriate: allergies, current medications, past family history, past medical history, past social history, past surgical history and problem list     Review of Systems   Constitutional: Positive for appetite change and fatigue  Negative for activity change and unexpected weight change  HENT: Negative  Eyes: Negative  Respiratory: Negative  Cardiovascular: Negative  Gastrointestinal: Negative for abdominal distention and abdominal pain  Endocrine: Negative  Genitourinary: Negative for pelvic pain and vaginal bleeding  Musculoskeletal: Negative  Skin: Negative  Allergic/Immunologic: Negative  Neurological: Negative  Hematological: Negative  Psychiatric/Behavioral: Negative  Current Outpatient Medications   Medication Sig Dispense Refill    acetaminophen (TYLENOL) 650 mg CR tablet Take 1,300 mg by mouth in the morning and 1,300 mg before bedtime        Bacillus Coagulans-Inulin (Probiotic) 1-250 BILLION-MG CAPS as directed      BD Pen Needle Yoana 2nd Gen 32G X 4 MM MISC 2 (two) times a day      Contour Next Test test strip 2 (two) times a day Test      fexofenadine (ALLEGRA) 180 MG tablet every 24 hours      furosemide (LASIX) 40 mg tablet Take 1 tablet by mouth daily      gabapentin (NEURONTIN) 300 mg capsule Take 300 mg by mouth 3 (three) times a day      ibuprofen (MOTRIN) 200 mg tablet Take 400 mg by mouth every 6 (six) hours as needed for mild pain      lidocaine-prilocaine (EMLA) cream Apply to port site 30 min prior to labs/chemo 30 g 2    lisinopril (ZESTRIL) 20 mg tablet Take 1 tablet by mouth daily      LORazepam (ATIVAN) 0 5 mg tablet Take 0 5 mg by mouth in the morning and 0 5 mg in the evening   magnesium Oxide (MAG-OX) 400 mg TABS Take 400 mg by mouth daily      magnesium oxide (MAG-OX) 400 mg Take 400 mg by mouth in the morning   metFORMIN (GLUCOPHAGE) 1000 MG tablet Take 1 tablet by mouth 2 (two) times a day with meals      Microlet Lancets MISC 2 (two) times a day Test      omeprazole (PriLOSEC) 20 mg delayed release capsule every 24 hours      ondansetron (ZOFRAN) 8 mg tablet Take 1 tablet (8 mg total) by mouth every 8 (eight) hours as needed for nausea or vomiting 20 tablet 1    oxyCODONE (Roxicodone) 5 immediate release tablet Take 1 tablet (5 mg total) by mouth every 6 (six) hours as needed for moderate pain Max Daily Amount: 20 mg 20 tablet 0    Rosuvastatin Calcium 20 MG CPSP Take 1 tablet by mouth daily      sertraline (ZOLOFT) 50 mg tablet Take 50 mg by mouth daily      sitaGLIPtin (JANUVIA) 100 mg tablet Take 1 tablet by mouth daily      Toujeo SoloStar 300 units/mL CONCENTRATED U-300 injection pen (1-unit dial) every 12 (twelve) hours 34 units in the am and 36 units at night       No current facility-administered medications for this visit  Objective:    Blood pressure 130/88, pulse 87, temperature 98 7 °F (37 1 °C), temperature source Temporal, resp  rate 16, height 5' 3 31" (1 608 m), weight 106 kg (234 lb), SpO2 98 %  Body mass index is 41 05 kg/m²  Body surface area is 2 07 meters squared  Physical Exam  Vitals reviewed  Constitutional:       General: She is not in acute distress  Appearance: Normal appearance  She is obese  She is not ill-appearing, toxic-appearing or diaphoretic  HENT:      Head: Normocephalic and atraumatic  Mouth/Throat:      Mouth: Mucous membranes are moist    Eyes:      General: No scleral icterus  Right eye: No discharge  Left eye: No discharge  Conjunctiva/sclera: Conjunctivae normal    Pulmonary:      Effort: Pulmonary effort is normal    Musculoskeletal:      Right lower leg: No edema  Left lower leg: No edema  Skin:     General: Skin is warm and dry  Coloration: Skin is not jaundiced  Findings: No rash  Neurological:      General: No focal deficit present  Mental Status: She is alert and oriented to person, place, and time  Cranial Nerves: No cranial nerve deficit  Motor: No weakness  Gait: Gait normal    Psychiatric:         Mood and Affect: Mood normal          Behavior: Behavior normal          Thought Content:  Thought content normal          Judgment: Judgment normal          Lab Results   Component Value Date     12 6 08/08/2022     Lab Results   Component Value Date    K 4 1 08/22/2022     08/22/2022    CO2 27 08/22/2022    BUN 12 08/22/2022    CREATININE 0 84 08/22/2022    GLUF 92 08/22/2022    CALCIUM 9 0 08/22/2022    CORRECTEDCA 9 9 08/22/2022    AST 20 08/22/2022    ALT 29 08/22/2022    ALKPHOS 93 08/22/2022    EGFR 71 08/22/2022     Lab Results   Component Value Date    WBC 3 46 (L) 08/22/2022    HGB 8 4 (L) 08/22/2022    HCT 28 6 (L) 08/22/2022    MCV 95 08/22/2022    PLT 75 (L) 08/22/2022     Lab Results   Component Value Date    NEUTROABS 1 66 (L) 08/22/2022        Trend:  Lab Results   Component Value Date     12 6 08/08/2022     14 3 07/11/2022     12 2 05/10/2022     13 6 04/25/2022     10 9 04/04/2022     11 4 03/15/2022     10 9 02/28/2022

## 2022-08-25 NOTE — ASSESSMENT & PLAN NOTE
70-year-old with stage IV B high-grade endometrial cancer, liver, retroperitoneal lymph node, lung metastatic disease who has received 7 cycles of carboplatin, Taxol and 3 cycles of bevacizumab  I reviewed her CBC, CMP, urine protein to creatinine ratio, CT of chest abdomen pelvis  She has disease progression in the right external iliac lymph nodes with stable disease elsewhere  She has pancytopenia  Her performance status is 2   1  I reviewed the CT imaging results with her and her   She understands that there is evidence of disease progression  2  I discussed stopping palliative carboplatin, Taxol, Avastin  Reviewed treatment options including immunotherapy based regimen, additional cytotoxic chemotherapy, no further tumor directed therapy  3  I discussed the risks and benefits of pembrolizumab at 200 mg IV every 3 weeks to be given along with lenvatinib at 10 mg orally daily  She understands that treatment is palliative but that this regimen has a fairly high response rate  We reviewed the possible side effects of this regimen in detail including thyroiditis, pancreatitis, pneumonitis, colitis, bowel perforation, hypertension, bleeding, blood clots, skin rashes, mouth sores, nausea, vomiting  She understands the risks and benefits of treatment and agrees to proceed as outlined  Consent for treatment as well as for potential blood transfusion was obtained by me in the office  4  I offered her palliative transfusion  Although she is fatigued, it is not as severe as prior and she will hold off for now  Will continue to trend labs prior to initiation of Keytruda and lenvatinib

## 2022-08-26 ENCOUNTER — DOCUMENTATION (OUTPATIENT)
Dept: HEMATOLOGY ONCOLOGY | Facility: CLINIC | Age: 67
End: 2022-08-26

## 2022-08-26 NOTE — PROGRESS NOTES
Received request from clinical for patient to start on Lenvima 10mg  Cecily Potts has been submitted via cover my meds and is pending Lau: Thomas Pale:       [id number]  PCN:      [id number]  ID:          [id number]  GRP:      [id number]    UPDATE:  This has been approved  [facility] is the filling pharmacy and funding has been provided as well

## 2022-08-29 ENCOUNTER — APPOINTMENT (OUTPATIENT)
Dept: LAB | Facility: HOSPITAL | Age: 67
End: 2022-08-29
Payer: COMMERCIAL

## 2022-08-30 DIAGNOSIS — C54.1 ENDOMETRIAL CANCER (HCC): Primary | ICD-10-CM

## 2022-08-30 RX ORDER — LENVATINIB 10 MG/1
10 CAPSULE ORAL DAILY
Qty: 30 EACH | Refills: 2 | Status: SHIPPED | OUTPATIENT
Start: 2022-08-30 | End: 2022-09-13

## 2022-09-01 ENCOUNTER — HOSPITAL ENCOUNTER (OUTPATIENT)
Dept: INFUSION CENTER | Facility: HOSPITAL | Age: 67
End: 2022-09-01
Attending: OBSTETRICS & GYNECOLOGY

## 2022-09-06 ENCOUNTER — APPOINTMENT (OUTPATIENT)
Dept: LAB | Facility: HOSPITAL | Age: 67
End: 2022-09-06
Payer: COMMERCIAL

## 2022-09-06 DIAGNOSIS — C54.1 ENDOMETRIAL CANCER (HCC): ICD-10-CM

## 2022-09-06 LAB
ALBUMIN SERPL BCP-MCNC: 3.1 G/DL (ref 3.5–5)
ALP SERPL-CCNC: 94 U/L (ref 46–116)
ALT SERPL W P-5'-P-CCNC: 28 U/L (ref 12–78)
ANION GAP SERPL CALCULATED.3IONS-SCNC: 9 MMOL/L (ref 4–13)
AST SERPL W P-5'-P-CCNC: 18 U/L (ref 5–45)
BASOPHILS # BLD AUTO: 0.02 THOUSANDS/ΜL (ref 0–0.1)
BASOPHILS NFR BLD AUTO: 0 % (ref 0–1)
BILIRUB SERPL-MCNC: 0.55 MG/DL (ref 0.2–1)
BUN SERPL-MCNC: 13 MG/DL (ref 5–25)
CALCIUM ALBUM COR SERPL-MCNC: 9.5 MG/DL (ref 8.3–10.1)
CALCIUM SERPL-MCNC: 8.8 MG/DL (ref 8.3–10.1)
CHLORIDE SERPL-SCNC: 104 MMOL/L (ref 96–108)
CO2 SERPL-SCNC: 26 MMOL/L (ref 21–32)
CREAT SERPL-MCNC: 0.94 MG/DL (ref 0.6–1.3)
EOSINOPHIL # BLD AUTO: 0.12 THOUSAND/ΜL (ref 0–0.61)
EOSINOPHIL NFR BLD AUTO: 3 % (ref 0–6)
ERYTHROCYTE [DISTWIDTH] IN BLOOD BY AUTOMATED COUNT: 18.7 % (ref 11.6–15.1)
GFR SERPL CREATININE-BSD FRML MDRD: 62 ML/MIN/1.73SQ M
GLUCOSE P FAST SERPL-MCNC: 269 MG/DL (ref 65–99)
HCT VFR BLD AUTO: 31 % (ref 34.8–46.1)
HGB BLD-MCNC: 9 G/DL (ref 11.5–15.4)
IMM GRANULOCYTES # BLD AUTO: 0.03 THOUSAND/UL (ref 0–0.2)
IMM GRANULOCYTES NFR BLD AUTO: 1 % (ref 0–2)
LYMPHOCYTES # BLD AUTO: 0.9 THOUSANDS/ΜL (ref 0.6–4.47)
LYMPHOCYTES NFR BLD AUTO: 19 % (ref 14–44)
MAGNESIUM SERPL-MCNC: 1.5 MG/DL (ref 1.6–2.6)
MCH RBC QN AUTO: 27.9 PG (ref 26.8–34.3)
MCHC RBC AUTO-ENTMCNC: 29 G/DL (ref 31.4–37.4)
MCV RBC AUTO: 96 FL (ref 82–98)
MONOCYTES # BLD AUTO: 0.76 THOUSAND/ΜL (ref 0.17–1.22)
MONOCYTES NFR BLD AUTO: 16 % (ref 4–12)
NEUTROPHILS # BLD AUTO: 3.02 THOUSANDS/ΜL (ref 1.85–7.62)
NEUTS SEG NFR BLD AUTO: 61 % (ref 43–75)
NRBC BLD AUTO-RTO: 0 /100 WBCS
PLATELET # BLD AUTO: 129 THOUSANDS/UL (ref 149–390)
PMV BLD AUTO: 10.6 FL (ref 8.9–12.7)
POTASSIUM SERPL-SCNC: 3.3 MMOL/L (ref 3.5–5.3)
PROT SERPL-MCNC: 6.2 G/DL (ref 6.4–8.4)
RBC # BLD AUTO: 3.23 MILLION/UL (ref 3.81–5.12)
SODIUM SERPL-SCNC: 139 MMOL/L (ref 135–147)
WBC # BLD AUTO: 4.85 THOUSAND/UL (ref 4.31–10.16)

## 2022-09-06 PROCEDURE — 83735 ASSAY OF MAGNESIUM: CPT | Performed by: PHYSICIAN ASSISTANT

## 2022-09-06 PROCEDURE — 36415 COLL VENOUS BLD VENIPUNCTURE: CPT | Performed by: PHYSICIAN ASSISTANT

## 2022-09-06 PROCEDURE — 80053 COMPREHEN METABOLIC PANEL: CPT | Performed by: PHYSICIAN ASSISTANT

## 2022-09-06 PROCEDURE — 85025 COMPLETE CBC W/AUTO DIFF WBC: CPT | Performed by: PHYSICIAN ASSISTANT

## 2022-09-08 ENCOUNTER — TELEPHONE (OUTPATIENT)
Dept: GYNECOLOGIC ONCOLOGY | Facility: CLINIC | Age: 67
End: 2022-09-08

## 2022-09-11 ENCOUNTER — HOSPITAL ENCOUNTER (EMERGENCY)
Facility: HOSPITAL | Age: 67
Discharge: HOME | End: 2022-09-12
Attending: EMERGENCY MEDICINE
Payer: COMMERCIAL

## 2022-09-11 DIAGNOSIS — R51.9 HEADACHE: Primary | ICD-10-CM

## 2022-09-11 DIAGNOSIS — G47.34 NOCTURNAL HYPOXIA: ICD-10-CM

## 2022-09-11 PROCEDURE — 99284 EMERGENCY DEPT VISIT MOD MDM: CPT

## 2022-09-12 ENCOUNTER — APPOINTMENT (OUTPATIENT)
Dept: LAB | Facility: HOSPITAL | Age: 67
End: 2022-09-12

## 2022-09-12 ENCOUNTER — APPOINTMENT (EMERGENCY)
Dept: CT IMAGING | Facility: HOSPITAL | Age: 67
End: 2022-09-12
Payer: COMMERCIAL

## 2022-09-12 ENCOUNTER — HOSPITAL ENCOUNTER (OUTPATIENT)
Facility: HOSPITAL | Age: 67
Setting detail: OBSERVATION
Discharge: HOME | End: 2022-09-13
Attending: EMERGENCY MEDICINE | Admitting: INTERNAL MEDICINE
Payer: COMMERCIAL

## 2022-09-12 ENCOUNTER — TELEPHONE (OUTPATIENT)
Dept: GYNECOLOGIC ONCOLOGY | Facility: CLINIC | Age: 67
End: 2022-09-12

## 2022-09-12 VITALS
TEMPERATURE: 98.2 F | SYSTOLIC BLOOD PRESSURE: 162 MMHG | BODY MASS INDEX: 40.75 KG/M2 | WEIGHT: 230 LBS | DIASTOLIC BLOOD PRESSURE: 70 MMHG | OXYGEN SATURATION: 95 % | HEIGHT: 63 IN | HEART RATE: 72 BPM | RESPIRATION RATE: 19 BRPM

## 2022-09-12 DIAGNOSIS — C54.1 ENDOMETRIAL CANCER (HCC): ICD-10-CM

## 2022-09-12 DIAGNOSIS — I16.0 HYPERTENSIVE URGENCY: ICD-10-CM

## 2022-09-12 DIAGNOSIS — J32.9 SINUSITIS: ICD-10-CM

## 2022-09-12 DIAGNOSIS — R51.9 HEADACHE: Primary | ICD-10-CM

## 2022-09-12 LAB
2HR DELTA HS TROPONIN: 0 NG/L
AMYLASE SERPL-CCNC: 28 IU/L (ref 25–115)
ANION GAP SERPL CALCULATED.3IONS-SCNC: 12 MMOL/L (ref 4–13)
ANION GAP SERPL CALCULATED.3IONS-SCNC: 14 MMOL/L (ref 4–13)
APTT PPP: 23 SECONDS (ref 23–37)
BASOPHILS # BLD AUTO: 0.02 THOUSANDS/ΜL (ref 0–0.1)
BASOPHILS # BLD AUTO: 0.03 THOUSANDS/ΜL (ref 0–0.1)
BASOPHILS NFR BLD AUTO: 0 % (ref 0–1)
BASOPHILS NFR BLD AUTO: 0 % (ref 0–1)
BUN SERPL-MCNC: 11 MG/DL (ref 5–25)
BUN SERPL-MCNC: 13 MG/DL (ref 5–25)
CALCIUM SERPL-MCNC: 9 MG/DL (ref 8.3–10.1)
CALCIUM SERPL-MCNC: 9.3 MG/DL (ref 8.3–10.1)
CANCER AG125 SERPL-ACNC: 7.3 U/ML (ref 0–30)
CARDIAC TROPONIN I PNL SERPL HS: 6 NG/L
CARDIAC TROPONIN I PNL SERPL HS: 7 NG/L
CARDIAC TROPONIN I PNL SERPL HS: 7 NG/L
CHLORIDE SERPL-SCNC: 101 MMOL/L (ref 96–108)
CHLORIDE SERPL-SCNC: 102 MMOL/L (ref 96–108)
CO2 SERPL-SCNC: 24 MMOL/L (ref 21–32)
CO2 SERPL-SCNC: 26 MMOL/L (ref 21–32)
CREAT SERPL-MCNC: 0.83 MG/DL (ref 0.6–1.3)
CREAT SERPL-MCNC: 1.21 MG/DL (ref 0.6–1.3)
CREAT UR-MCNC: 255 MG/DL
D DIMER PPP FEU-MCNC: 2.6 UG/ML FEU
EOSINOPHIL # BLD AUTO: 0.07 THOUSAND/ΜL (ref 0–0.61)
EOSINOPHIL # BLD AUTO: 0.15 THOUSAND/ΜL (ref 0–0.61)
EOSINOPHIL NFR BLD AUTO: 1 % (ref 0–6)
EOSINOPHIL NFR BLD AUTO: 2 % (ref 0–6)
ERYTHROCYTE [DISTWIDTH] IN BLOOD BY AUTOMATED COUNT: 18.5 % (ref 11.6–15.1)
ERYTHROCYTE [DISTWIDTH] IN BLOOD BY AUTOMATED COUNT: 18.6 % (ref 11.6–15.1)
GFR SERPL CREATININE-BSD FRML MDRD: 46 ML/MIN/1.73SQ M
GFR SERPL CREATININE-BSD FRML MDRD: 72 ML/MIN/1.73SQ M
GLUCOSE SERPL-MCNC: 109 MG/DL (ref 65–140)
GLUCOSE SERPL-MCNC: 124 MG/DL (ref 65–140)
GLUCOSE SERPL-MCNC: 205 MG/DL (ref 65–140)
HCT VFR BLD AUTO: 28.5 % (ref 34.8–46.1)
HCT VFR BLD AUTO: 31.6 % (ref 34.8–46.1)
HGB BLD-MCNC: 9.1 G/DL (ref 11.5–15.4)
HGB BLD-MCNC: 9.7 G/DL (ref 11.5–15.4)
IMM GRANULOCYTES # BLD AUTO: 0.04 THOUSAND/UL (ref 0–0.2)
IMM GRANULOCYTES # BLD AUTO: 0.05 THOUSAND/UL (ref 0–0.2)
IMM GRANULOCYTES NFR BLD AUTO: 1 % (ref 0–2)
IMM GRANULOCYTES NFR BLD AUTO: 1 % (ref 0–2)
INR PPP: 0.91 (ref 0.84–1.19)
LIPASE SERPL-CCNC: 60 U/L (ref 73–393)
LYMPHOCYTES # BLD AUTO: 1.07 THOUSANDS/ΜL (ref 0.6–4.47)
LYMPHOCYTES # BLD AUTO: 1.71 THOUSANDS/ΜL (ref 0.6–4.47)
LYMPHOCYTES NFR BLD AUTO: 12 % (ref 14–44)
LYMPHOCYTES NFR BLD AUTO: 20 % (ref 14–44)
MAGNESIUM SERPL-MCNC: 1.7 MG/DL (ref 1.6–2.6)
MCH RBC QN AUTO: 28.8 PG (ref 26.8–34.3)
MCH RBC QN AUTO: 29.2 PG (ref 26.8–34.3)
MCHC RBC AUTO-ENTMCNC: 30.7 G/DL (ref 31.4–37.4)
MCHC RBC AUTO-ENTMCNC: 31.9 G/DL (ref 31.4–37.4)
MCV RBC AUTO: 90 FL (ref 82–98)
MCV RBC AUTO: 95 FL (ref 82–98)
MONOCYTES # BLD AUTO: 0.88 THOUSAND/ΜL (ref 0.17–1.22)
MONOCYTES # BLD AUTO: 1.31 THOUSAND/ΜL (ref 0.17–1.22)
MONOCYTES NFR BLD AUTO: 10 % (ref 4–12)
MONOCYTES NFR BLD AUTO: 15 % (ref 4–12)
NEUTROPHILS # BLD AUTO: 5.38 THOUSANDS/ΜL (ref 1.85–7.62)
NEUTROPHILS # BLD AUTO: 6.64 THOUSANDS/ΜL (ref 1.85–7.62)
NEUTS SEG NFR BLD AUTO: 62 % (ref 43–75)
NEUTS SEG NFR BLD AUTO: 76 % (ref 43–75)
NRBC BLD AUTO-RTO: 0 /100 WBCS
NRBC BLD AUTO-RTO: 0 /100 WBCS
PLATELET # BLD AUTO: 125 THOUSANDS/UL (ref 149–390)
PLATELET # BLD AUTO: 129 THOUSANDS/UL (ref 149–390)
PMV BLD AUTO: 9.9 FL (ref 8.9–12.7)
PMV BLD AUTO: 9.9 FL (ref 8.9–12.7)
POTASSIUM SERPL-SCNC: 3.6 MMOL/L (ref 3.5–5.3)
POTASSIUM SERPL-SCNC: 3.7 MMOL/L (ref 3.5–5.3)
PROT UR-MCNC: 60 MG/DL
PROT/CREAT UR: 0.24 MG/G{CREAT} (ref 0–0.1)
PROTHROMBIN TIME: 13 SECONDS (ref 11.6–14.5)
RBC # BLD AUTO: 3.16 MILLION/UL (ref 3.81–5.12)
RBC # BLD AUTO: 3.32 MILLION/UL (ref 3.81–5.12)
SODIUM SERPL-SCNC: 139 MMOL/L (ref 135–147)
SODIUM SERPL-SCNC: 140 MMOL/L (ref 135–147)
TSH SERPL DL<=0.05 MIU/L-ACNC: 1.96 UIU/ML (ref 0.45–4.5)
WBC # BLD AUTO: 8.62 THOUSAND/UL (ref 4.31–10.16)
WBC # BLD AUTO: 8.73 THOUSAND/UL (ref 4.31–10.16)

## 2022-09-12 PROCEDURE — G1004 CDSM NDSC: HCPCS

## 2022-09-12 PROCEDURE — 85730 THROMBOPLASTIN TIME PARTIAL: CPT | Performed by: EMERGENCY MEDICINE

## 2022-09-12 PROCEDURE — 36415 COLL VENOUS BLD VENIPUNCTURE: CPT | Performed by: EMERGENCY MEDICINE

## 2022-09-12 PROCEDURE — 83735 ASSAY OF MAGNESIUM: CPT | Performed by: EMERGENCY MEDICINE

## 2022-09-12 PROCEDURE — 82570 ASSAY OF URINE CREATININE: CPT | Performed by: PHYSICIAN ASSISTANT

## 2022-09-12 PROCEDURE — 85025 COMPLETE CBC W/AUTO DIFF WBC: CPT | Performed by: EMERGENCY MEDICINE

## 2022-09-12 PROCEDURE — 84484 ASSAY OF TROPONIN QUANT: CPT | Performed by: EMERGENCY MEDICINE

## 2022-09-12 PROCEDURE — 93005 ELECTROCARDIOGRAM TRACING: CPT

## 2022-09-12 PROCEDURE — 96375 TX/PRO/DX INJ NEW DRUG ADDON: CPT

## 2022-09-12 PROCEDURE — 96374 THER/PROPH/DIAG INJ IV PUSH: CPT

## 2022-09-12 PROCEDURE — 82948 REAGENT STRIP/BLOOD GLUCOSE: CPT

## 2022-09-12 PROCEDURE — 99285 EMERGENCY DEPT VISIT HI MDM: CPT | Performed by: EMERGENCY MEDICINE

## 2022-09-12 PROCEDURE — 80048 BASIC METABOLIC PNL TOTAL CA: CPT | Performed by: EMERGENCY MEDICINE

## 2022-09-12 PROCEDURE — 85610 PROTHROMBIN TIME: CPT | Performed by: EMERGENCY MEDICINE

## 2022-09-12 PROCEDURE — 99284 EMERGENCY DEPT VISIT MOD MDM: CPT

## 2022-09-12 PROCEDURE — 96361 HYDRATE IV INFUSION ADD-ON: CPT

## 2022-09-12 PROCEDURE — 85379 FIBRIN DEGRADATION QUANT: CPT | Performed by: EMERGENCY MEDICINE

## 2022-09-12 PROCEDURE — 82150 ASSAY OF AMYLASE: CPT | Performed by: PHYSICIAN ASSISTANT

## 2022-09-12 PROCEDURE — 71275 CT ANGIOGRAPHY CHEST: CPT

## 2022-09-12 PROCEDURE — 83690 ASSAY OF LIPASE: CPT | Performed by: PHYSICIAN ASSISTANT

## 2022-09-12 PROCEDURE — 84156 ASSAY OF PROTEIN URINE: CPT | Performed by: PHYSICIAN ASSISTANT

## 2022-09-12 PROCEDURE — 84443 ASSAY THYROID STIM HORMONE: CPT | Performed by: PHYSICIAN ASSISTANT

## 2022-09-12 PROCEDURE — 70450 CT HEAD/BRAIN W/O DYE: CPT

## 2022-09-12 PROCEDURE — 86304 IMMUNOASSAY TUMOR CA 125: CPT | Performed by: PHYSICIAN ASSISTANT

## 2022-09-12 PROCEDURE — 99220 PR INITIAL OBSERVATION CARE/DAY 70 MINUTES: CPT | Performed by: INTERNAL MEDICINE

## 2022-09-12 PROCEDURE — 96365 THER/PROPH/DIAG IV INF INIT: CPT

## 2022-09-12 RX ORDER — MAGNESIUM SULFATE 1 G/100ML
1 INJECTION INTRAVENOUS ONCE
Status: COMPLETED | OUTPATIENT
Start: 2022-09-12 | End: 2022-09-13

## 2022-09-12 RX ORDER — PANTOPRAZOLE SODIUM 40 MG/1
40 TABLET, DELAYED RELEASE ORAL
Status: DISCONTINUED | OUTPATIENT
Start: 2022-09-13 | End: 2022-09-13 | Stop reason: HOSPADM

## 2022-09-12 RX ORDER — ATORVASTATIN CALCIUM 40 MG/1
40 TABLET, FILM COATED ORAL
Status: DISCONTINUED | OUTPATIENT
Start: 2022-09-13 | End: 2022-09-13 | Stop reason: HOSPADM

## 2022-09-12 RX ORDER — LISINOPRIL 20 MG/1
40 TABLET ORAL DAILY
Status: DISCONTINUED | OUTPATIENT
Start: 2022-09-13 | End: 2022-09-13 | Stop reason: HOSPADM

## 2022-09-12 RX ORDER — METOCLOPRAMIDE HYDROCHLORIDE 5 MG/ML
10 INJECTION INTRAMUSCULAR; INTRAVENOUS ONCE
Status: COMPLETED | OUTPATIENT
Start: 2022-09-12 | End: 2022-09-12

## 2022-09-12 RX ORDER — ATORVASTATIN CALCIUM 20 MG/1
20 TABLET, FILM COATED ORAL
Status: CANCELLED | OUTPATIENT
Start: 2022-09-12

## 2022-09-12 RX ORDER — GABAPENTIN 300 MG/1
300 CAPSULE ORAL 3 TIMES DAILY
Status: DISCONTINUED | OUTPATIENT
Start: 2022-09-12 | End: 2022-09-13 | Stop reason: HOSPADM

## 2022-09-12 RX ORDER — FUROSEMIDE 40 MG/1
40 TABLET ORAL DAILY
Status: DISCONTINUED | OUTPATIENT
Start: 2022-09-13 | End: 2022-09-13 | Stop reason: HOSPADM

## 2022-09-12 RX ORDER — MAGNESIUM SULFATE HEPTAHYDRATE 40 MG/ML
2 INJECTION, SOLUTION INTRAVENOUS ONCE
Status: COMPLETED | OUTPATIENT
Start: 2022-09-12 | End: 2022-09-12

## 2022-09-12 RX ORDER — LORATADINE 10 MG/1
10 TABLET ORAL DAILY
Status: CANCELLED | OUTPATIENT
Start: 2022-09-13

## 2022-09-12 RX ORDER — LORAZEPAM 0.5 MG/1
0.5 TABLET ORAL 2 TIMES DAILY
Status: DISCONTINUED | OUTPATIENT
Start: 2022-09-12 | End: 2022-09-13 | Stop reason: HOSPADM

## 2022-09-12 RX ORDER — ONDANSETRON 2 MG/ML
4 INJECTION INTRAMUSCULAR; INTRAVENOUS EVERY 4 HOURS PRN
Status: DISCONTINUED | OUTPATIENT
Start: 2022-09-12 | End: 2022-09-13 | Stop reason: HOSPADM

## 2022-09-12 RX ORDER — ACETAMINOPHEN 325 MG/1
975 TABLET ORAL ONCE
Status: COMPLETED | OUTPATIENT
Start: 2022-09-12 | End: 2022-09-12

## 2022-09-12 RX ORDER — METOPROLOL TARTRATE 5 MG/5ML
10 INJECTION INTRAVENOUS ONCE
Status: COMPLETED | OUTPATIENT
Start: 2022-09-12 | End: 2022-09-12

## 2022-09-12 RX ORDER — DIPHENHYDRAMINE HYDROCHLORIDE 50 MG/ML
25 INJECTION INTRAMUSCULAR; INTRAVENOUS ONCE
Status: COMPLETED | OUTPATIENT
Start: 2022-09-12 | End: 2022-09-12

## 2022-09-12 RX ORDER — ACETAMINOPHEN 325 MG/1
650 TABLET ORAL EVERY 6 HOURS PRN
Status: DISCONTINUED | OUTPATIENT
Start: 2022-09-12 | End: 2022-09-12

## 2022-09-12 RX ORDER — FUROSEMIDE 40 MG/1
40 TABLET ORAL DAILY
Status: CANCELLED | OUTPATIENT
Start: 2022-09-13

## 2022-09-12 RX ORDER — HYDRALAZINE HYDROCHLORIDE 20 MG/ML
5 INJECTION INTRAMUSCULAR; INTRAVENOUS EVERY 6 HOURS PRN
Status: DISCONTINUED | OUTPATIENT
Start: 2022-09-12 | End: 2022-09-13 | Stop reason: HOSPADM

## 2022-09-12 RX ORDER — LORATADINE 10 MG/1
10 TABLET ORAL DAILY
Status: DISCONTINUED | OUTPATIENT
Start: 2022-09-13 | End: 2022-09-13 | Stop reason: HOSPADM

## 2022-09-12 RX ORDER — ACETAMINOPHEN 325 MG/1
650 TABLET ORAL EVERY 6 HOURS PRN
Status: DISCONTINUED | OUTPATIENT
Start: 2022-09-12 | End: 2022-09-13 | Stop reason: HOSPADM

## 2022-09-12 RX ORDER — LISINOPRIL 20 MG/1
20 TABLET ORAL DAILY
Status: CANCELLED | OUTPATIENT
Start: 2022-09-13

## 2022-09-12 RX ORDER — ONDANSETRON 2 MG/ML
4 INJECTION INTRAMUSCULAR; INTRAVENOUS EVERY 6 HOURS PRN
Status: DISCONTINUED | OUTPATIENT
Start: 2022-09-12 | End: 2022-09-12

## 2022-09-12 RX ORDER — HEPARIN SODIUM 5000 [USP'U]/ML
5000 INJECTION, SOLUTION INTRAVENOUS; SUBCUTANEOUS EVERY 8 HOURS SCHEDULED
Status: DISCONTINUED | OUTPATIENT
Start: 2022-09-13 | End: 2022-09-13 | Stop reason: HOSPADM

## 2022-09-12 RX ORDER — AMLODIPINE BESYLATE 5 MG/1
5 TABLET ORAL DAILY
Status: DISCONTINUED | OUTPATIENT
Start: 2022-09-13 | End: 2022-09-13 | Stop reason: HOSPADM

## 2022-09-12 RX ORDER — INSULIN LISPRO 100 [IU]/ML
1-6 INJECTION, SOLUTION INTRAVENOUS; SUBCUTANEOUS
Status: DISCONTINUED | OUTPATIENT
Start: 2022-09-12 | End: 2022-09-13 | Stop reason: HOSPADM

## 2022-09-12 RX ORDER — GABAPENTIN 300 MG/1
300 CAPSULE ORAL 3 TIMES DAILY
Status: CANCELLED | OUTPATIENT
Start: 2022-09-12

## 2022-09-12 RX ORDER — MORPHINE SULFATE 4 MG/ML
4 INJECTION, SOLUTION INTRAMUSCULAR; INTRAVENOUS ONCE
Status: COMPLETED | OUTPATIENT
Start: 2022-09-12 | End: 2022-09-12

## 2022-09-12 RX ORDER — HYDRALAZINE HYDROCHLORIDE 20 MG/ML
10 INJECTION INTRAMUSCULAR; INTRAVENOUS ONCE
Status: COMPLETED | OUTPATIENT
Start: 2022-09-12 | End: 2022-09-12

## 2022-09-12 RX ORDER — LORAZEPAM 0.5 MG/1
0.5 TABLET ORAL 2 TIMES DAILY
Status: CANCELLED | OUTPATIENT
Start: 2022-09-12

## 2022-09-12 RX ORDER — INSULIN GLARGINE 100 [IU]/ML
36 INJECTION, SOLUTION SUBCUTANEOUS EVERY 12 HOURS SCHEDULED
Status: DISCONTINUED | OUTPATIENT
Start: 2022-09-12 | End: 2022-09-13 | Stop reason: HOSPADM

## 2022-09-12 RX ORDER — INSULIN LISPRO 100 [IU]/ML
1-6 INJECTION, SOLUTION INTRAVENOUS; SUBCUTANEOUS
Status: DISCONTINUED | OUTPATIENT
Start: 2022-09-13 | End: 2022-09-13 | Stop reason: HOSPADM

## 2022-09-12 RX ADMIN — HYDRALAZINE HYDROCHLORIDE 10 MG: 20 INJECTION, SOLUTION INTRAMUSCULAR; INTRAVENOUS at 21:19

## 2022-09-12 RX ADMIN — METOCLOPRAMIDE 10 MG: 5 INJECTION, SOLUTION INTRAMUSCULAR; INTRAVENOUS at 20:05

## 2022-09-12 RX ADMIN — ACETAMINOPHEN 325MG 650 MG: 325 TABLET ORAL at 23:01

## 2022-09-12 RX ADMIN — DIPHENHYDRAMINE HYDROCHLORIDE 25 MG: 50 INJECTION, SOLUTION INTRAMUSCULAR; INTRAVENOUS at 00:32

## 2022-09-12 RX ADMIN — MORPHINE SULFATE 4 MG: 4 INJECTION INTRAVENOUS at 00:32

## 2022-09-12 RX ADMIN — INSULIN GLARGINE 36 UNITS: 100 INJECTION, SOLUTION SUBCUTANEOUS at 23:22

## 2022-09-12 RX ADMIN — DIPHENHYDRAMINE HYDROCHLORIDE 25 MG: 50 INJECTION, SOLUTION INTRAMUSCULAR; INTRAVENOUS at 20:05

## 2022-09-12 RX ADMIN — MAGNESIUM SULFATE HEPTAHYDRATE 2 G: 40 INJECTION, SOLUTION INTRAVENOUS at 00:33

## 2022-09-12 RX ADMIN — METOPROLOL TARTRATE 10 MG: 1 INJECTION, SOLUTION INTRAVENOUS at 20:05

## 2022-09-12 RX ADMIN — LORAZEPAM 0.5 MG: 0.5 TABLET ORAL at 23:01

## 2022-09-12 RX ADMIN — METOCLOPRAMIDE 10 MG: 5 INJECTION, SOLUTION INTRAMUSCULAR; INTRAVENOUS at 00:32

## 2022-09-12 RX ADMIN — MAGNESIUM SULFATE IN DEXTROSE 1 G: 10 INJECTION, SOLUTION INTRAVENOUS at 23:09

## 2022-09-12 RX ADMIN — IOHEXOL 90 ML: 350 INJECTION, SOLUTION INTRAVENOUS at 02:03

## 2022-09-12 RX ADMIN — GABAPENTIN 300 MG: 300 CAPSULE ORAL at 23:01

## 2022-09-12 RX ADMIN — SODIUM CHLORIDE 1000 ML: 0.9 INJECTION, SOLUTION INTRAVENOUS at 02:11

## 2022-09-12 RX ADMIN — ACETAMINOPHEN 975 MG: 325 TABLET, FILM COATED ORAL at 03:40

## 2022-09-12 NOTE — ED PROVIDER NOTES
History  Chief Complaint   Patient presents with    Headache     Headache x3 weeks  Here yesterday for same  Tylenol and muscle relaxer taken at 1000 today     Past medical history uterine cancer on chemotherapy, hypertension, returns to ER after being here last night with headache  Patient has a generalized headache intermittent for couple weeks now but constant over the last 2-3 days  Improved last night in ER but returned  Also noticed persistent HTN no improvement with increased lisinopril dosing  Patient states she gets intermittent episodes of trouble speaking - ct head was nl yesterday evening  Her oncologist [place] Yi through [facility] recommended admission to confirm BP is under control as this is delaying her from getting her chemo  Prior to Admission Medications   Prescriptions Last Dose Informant Patient Reported? Taking? BD Pen Needle Yoana 2nd Gen 32G X 4 MM MISC  Self Yes No   Si (two) times a day   Bacillus Coagulans-Inulin (Probiotic) 1-250 BILLION-MG CAPS Not Taking at Unknown time Self Yes No   Sig: as directed   Patient not taking: Reported on 2022   Contour Next Test test strip  Self Yes No   Si (two) times a day Test   LORazepam (ATIVAN) 0 5 mg tablet 2022 at Unknown time Self Yes Yes   Sig: Take 0 5 mg by mouth in the morning and 0 5 mg in the evening  Lenvatinib, 10 MG Daily Dose, (Lenvima, 10 MG Daily Dose,) 10 MG CPPK   No No   Sig: Take 10 mg by mouth daily   Microlet Lancets MISC  Self Yes No   Si (two) times a day Test   Rosuvastatin Calcium 20 MG CPSP 2022 at Unknown time Self Yes Yes   Sig: Take 1 tablet by mouth daily   Toujeo SoloStar 300 units/mL CONCENTRATED U-300 injection pen (1-unit dial)  Self Yes No   Sig: every 12 (twelve) hours 34 units in the am and 36 units at night   acetaminophen (TYLENOL) 650 mg CR tablet 2022 at Unknown time Self Yes Yes   Sig: Take 1,300 mg by mouth in the morning and 1,300 mg before bedtime  fexofenadine (ALLEGRA) 180 MG tablet 9/12/2022 at Unknown time Self Yes Yes   Sig: every 24 hours   furosemide (LASIX) 40 mg tablet 9/12/2022 at Unknown time Self Yes Yes   Sig: Take 1 tablet by mouth daily   gabapentin (NEURONTIN) 300 mg capsule 9/12/2022 at Unknown time Self Yes Yes   Sig: Take 300 mg by mouth 3 (three) times a day   ibuprofen (MOTRIN) 200 mg tablet More than a month at Unknown time Self Yes No   Sig: Take 400 mg by mouth every 6 (six) hours as needed for mild pain   lidocaine-prilocaine (EMLA) cream More than a month at Unknown time Self No No   Sig: Apply to port site 30 min prior to labs/chemo   lisinopril (ZESTRIL) 20 mg tablet 9/12/2022 at Unknown time Self Yes Yes   Sig: Take 1 tablet by mouth daily   magnesium Oxide (MAG-OX) 400 mg TABS 9/12/2022 at Unknown time  Yes Yes   Sig: Take 400 mg by mouth daily   magnesium oxide (MAG-OX) 400 mg 9/12/2022 at Unknown time Self Yes Yes   Sig: Take 400 mg by mouth in the morning     metFORMIN (GLUCOPHAGE) 1000 MG tablet 9/12/2022 at Unknown time Self Yes Yes   Sig: Take 1 tablet by mouth 2 (two) times a day with meals   omeprazole (PriLOSEC) 20 mg delayed release capsule 9/12/2022 at Unknown time Self Yes Yes   Sig: every 24 hours   ondansetron (ZOFRAN) 8 mg tablet Past Week at Unknown time  No Yes   Sig: Take 1 tablet (8 mg total) by mouth every 8 (eight) hours as needed for nausea or vomiting   sertraline (ZOLOFT) 50 mg tablet 9/12/2022 at Unknown time Self Yes Yes   Sig: Take 50 mg by mouth daily   sitaGLIPtin (JANUVIA) 100 mg tablet 9/12/2022 at Unknown time Self Yes Yes   Sig: Take 1 tablet by mouth daily      Facility-Administered Medications: None       Past Medical History:   Diagnosis Date    Anxiety     Arthritis     Back pain     [facility]     Depression     Diabetes mellitus ([facility] )     Disease of thyroid gland     [facility] ([facility] )     GERD (gastroesophageal reflux disease)     Hyperlipidemia associated with type 2 diabetes mellitus ([facility] )     Hypertension     Morbid obesity with BMI of 40 0-44 9, adult ([facility] )     Type 2 diabetes mellitus without complication ([facility] )     Urinary incontinence     Wears glasses        Past Surgical History:   Procedure Laterality Date    ABDOMINAL ADHESION SURGERY N/A 5/31/2022    Procedure: LYSIS ADHESIONS LAPAROSCOPIC W ROBOT;  Surgeon: Saniya Harris MD;  [facility];  Service: Gynecology Oncology    CATARACT EXTRACTION W/ INTRAOCULAR LENS IMPLANT Bilateral     CHOLECYSTECTOMY OPEN      CYSTOSCOPY N/A 5/31/2022    Procedure: Binnie Deems;  Surgeon: Saniya Harris MD;  [facility];  Service: Gynecology Oncology    IR PORT PLACEMENT  03/07/2022    AZ LAPAROSCOPY W TOT HYSTERECTUTERUS <=250 GRAM  W TUBE/OVARY N/A 5/31/2022    Procedure: ROBOTIC ASSISTED TOTAL LAPAROSCOPIC HYSTERECTOMY, RIGHT SALPINGO-OOPHORECTOMY, LEFT SALPINGECTOMY,;  Surgeon: Saniya Harris MD;  [facility];  Service: Gynecology Oncology    SALPINGOOPHORECTOMY N/A     only had one removed and not sure which    TONSILLECTOMY         History reviewed  No pertinent family history  I have reviewed and agree with the history as documented  E-Cigarette/Vaping    E-Cigarette Use Never User      E-Cigarette/Vaping Substances     Social History     Tobacco Use    Smoking status: Never Smoker    Smokeless tobacco: Never Used   Vaping Use    Vaping Use: Never used   Substance Use Topics    Alcohol use: Not Currently    Drug use: Never       Review of Systems   Constitutional: Negative for chills and fever  HENT: Negative for rhinorrhea and sore throat  Respiratory: Negative for shortness of breath  Cardiovascular: Negative for chest pain  Gastrointestinal: Negative for constipation, diarrhea, nausea and vomiting  Genitourinary: Negative for dysuria and frequency  Skin: Negative for rash  All other systems reviewed and are negative        Physical Exam  Physical Exam  Vitals and nursing note reviewed  Constitutional:       Appearance: She is well-developed  HENT:      Head: Normocephalic and atraumatic  Right Ear: External ear normal       Left Ear: External ear normal       Nose:      Right Sinus: Maxillary sinus tenderness and frontal sinus tenderness present  Left Sinus: Maxillary sinus tenderness and frontal sinus tenderness present  Mouth/Throat:      Mouth: Mucous membranes are dry  Eyes:      Conjunctiva/sclera: Conjunctivae normal       Pupils: Pupils are equal, round, and reactive to light  Cardiovascular:      Rate and Rhythm: Normal rate and regular rhythm  Heart sounds: Normal heart sounds  Pulmonary:      Effort: Pulmonary effort is normal  No respiratory distress  Breath sounds: Normal breath sounds  No wheezing  Abdominal:      General: Bowel sounds are normal  There is no distension  Palpations: Abdomen is soft  Tenderness: There is no abdominal tenderness  Musculoskeletal:         General: No deformity  Normal range of motion  Cervical back: Normal range of motion and neck supple  No spinous process tenderness  Skin:     General: Skin is warm and dry  Findings: No rash  Neurological:      General: No focal deficit present  Mental Status: She is alert  GCS: GCS eye subscore is 4  GCS verbal subscore is 5  GCS motor subscore is 6  Cranial Nerves: Cranial nerves are intact  Sensory: Sensation is intact  No sensory deficit  Motor: Motor function is intact  Coordination: Coordination is intact     Psychiatric:         Mood and Affect: Mood normal          Vital Signs  ED Triage Vitals [09/12/22 1604]   Temperature Pulse Respirations Blood Pressure SpO2   98 9 °F (37 2 °C) 95 20 (!) 231/102 95 %      Temp Source Heart Rate Source Patient Position - Orthostatic VS BP Location FiO2 (%)   Temporal Monitor Sitting Right arm --      Pain Score       9 Vitals:    09/12/22 2147 09/12/22 2305 09/13/22 0555 09/13/22 0738   BP: 166/56 167/83 165/73 167/77   Pulse: 80 86 77 80   Patient Position - Orthostatic VS: Sitting Sitting           Visual Acuity      ED Medications  Medications   acetaminophen (TYLENOL) tablet 650 mg (650 mg Oral Given 9/12/22 2301)   ondansetron (ZOFRAN) injection 4 mg (has no administration in time range)   loratadine (CLARITIN) tablet 10 mg (has no administration in time range)   furosemide (LASIX) tablet 40 mg (has no administration in time range)   gabapentin (NEURONTIN) capsule 300 mg (300 mg Oral Given 9/12/22 2301)   lisinopril (ZESTRIL) tablet 40 mg (has no administration in time range)   LORazepam (ATIVAN) tablet 0 5 mg (0 5 mg Oral Given 9/12/22 2301)   magnesium oxide (MAG-OX) tablet 400 mg (has no administration in time range)   pantoprazole (PROTONIX) EC tablet 40 mg (has no administration in time range)   atorvastatin (LIPITOR) tablet 40 mg (has no administration in time range)   sertraline (ZOLOFT) tablet 50 mg (has no administration in time range)   insulin glargine (LANTUS) subcutaneous injection 36 Units 0 36 mL (36 Units Subcutaneous Given 9/12/22 2322)   amLODIPine (NORVASC) tablet 5 mg (has no administration in time range)   heparin (porcine) subcutaneous injection 5,000 Units (5,000 Units Subcutaneous Given 9/13/22 0540)   insulin lispro (HumaLOG) 100 units/mL subcutaneous injection 1-6 Units (has no administration in time range)   insulin lispro (HumaLOG) 100 units/mL subcutaneous injection 1-6 Units (1 Units Subcutaneous Not Given 9/12/22 2321)   hydrALAZINE (APRESOLINE) injection 5 mg (has no administration in time range)   metoclopramide (REGLAN) injection 10 mg (10 mg Intravenous Given 9/12/22 2005)   diphenhydrAMINE (BENADRYL) injection 25 mg (25 mg Intravenous Given 9/12/22 2005)   metoprolol (LOPRESSOR) injection 10 mg (10 mg Intravenous Given 9/12/22 2005)   hydrALAZINE (APRESOLINE) injection 10 mg (10 mg Intravenous Given 9/12/22 2119)   magnesium sulfate IVPB (premix) SOLN 1 g (0 g Intravenous Stopped 9/13/22 0010)       Diagnostic Studies  Results Reviewed     Procedure Component Value Units Date/Time    CBC and differential [[id number]]  (Abnormal) Collected: 09/12/22 1957    Lab Status: Final result Specimen: Blood from Arm, Right Updated: 09/12/22 2054     WBC 8 73 Thousand/uL      RBC 3 16 Million/uL      Hemoglobin 9 1 g/dL      Hematocrit 28 5 %      MCV 90 fL      MCH 28 8 pg      MCHC 31 9 g/dL      RDW 18 5 %      MPV 9 9 fL      Platelets 525 Thousands/uL      nRBC 0 /100 WBCs      Neutrophils Relative 76 %      Immat GRANS % 1 %      Lymphocytes Relative 12 %      Monocytes Relative 10 %      Eosinophils Relative 1 %      Basophils Relative 0 %      Neutrophils Absolute 6 64 Thousands/µL      Immature Grans Absolute 0 05 Thousand/uL      Lymphocytes Absolute 1 07 Thousands/µL      Monocytes Absolute 0 88 Thousand/µL      Eosinophils Absolute 0 07 Thousand/µL      Basophils Absolute 0 02 Thousands/µL     HS Troponin 0hr (reflex protocol) [[id number]]  (Normal) Collected: 09/12/22 1957    Lab Status: Final result Specimen: Blood from Arm, Right Updated: 09/12/22 2035     hs TnI 0hr 6 ng/L     Basic metabolic panel [[id number]] Collected: 09/12/22 1957    Lab Status: Final result Specimen: Blood from Arm, Right Updated: 09/12/22 2022     Sodium 139 mmol/L      Potassium 3 7 mmol/L      Chloride 101 mmol/L      CO2 26 mmol/L      ANION GAP 12 mmol/L      BUN 11 mg/dL      Creatinine 0 83 mg/dL      Glucose 124 mg/dL      Calcium 9 0 mg/dL      eGFR 72 ml/min/1 73sq m     Narrative:      Shala guidelines for Chronic Kidney Disease (CKD):     Stage 1 with normal or high GFR (GFR > 90 mL/min/1 73 square meters)    Stage 2 Mild CKD (GFR = 60-89 mL/min/1 73 square meters)    Stage 3A Moderate CKD (GFR = 45-59 mL/min/1 73 square meters)    Stage 3B Moderate CKD (GFR = 30-44 mL/min/1 73 square meters)    Stage 4 Severe CKD (GFR = 15-29 mL/min/1 73 square meters)    Stage 5 End Stage CKD (GFR <15 mL/min/1 73 square meters)  Note: GFR calculation is accurate only with a steady state creatinine    Magnesium [[id number]]  (Normal) Collected: 09/12/22 1957    Lab Status: Final result Specimen: Blood from Arm, Right Updated: 09/12/22 2022     Magnesium 1 7 mg/dL                  No orders to display              Procedures  Procedures         ED Course                                             MDM  Number of Diagnoses or Management Options  Headache: established and worsening  Hypertensive urgency: new and requires workup     Amount and/or Complexity of Data Reviewed  Clinical lab tests: ordered and reviewed  Tests in the radiology section of CPT®: ordered and reviewed  Obtain history from someone other than the patient: yes  Discuss the patient with other providers: yes    Patient Progress  Patient progress: improved      Disposition  Final diagnoses:   Headache   Hypertensive urgency     Time reflects when diagnosis was documented in both MDM as applicable and the Disposition within this note     Time User Action Codes Description Comment    9/12/2022  9:30 PM Nathan Robles [R51 9] Headache     9/12/2022  9:30 PM Nathan Robles [I16 0] Hypertensive urgency       ED Disposition     ED Disposition   Admit    Condition   Stable    Date/Time   Mon Sep 12, 2022  9:30 PM    Comment   Case was discussed with Randy Souza and the patient's admission status was agreed to be Admission Status: observation status to the service of Dr Uma Coe   Follow-up Information    None         Current Discharge Medication List      CONTINUE these medications which have NOT CHANGED    Details   acetaminophen (TYLENOL) 650 mg CR tablet Take 1,300 mg by mouth in the morning and 1,300 mg before bedtime        fexofenadine (ALLEGRA) 180 MG tablet every 24 hours      furosemide (LASIX) 40 mg tablet Take 1 tablet by mouth daily      gabapentin (NEURONTIN) 300 mg capsule Take 300 mg by mouth 3 (three) times a day      lisinopril (ZESTRIL) 20 mg tablet Take 1 tablet by mouth daily      LORazepam (ATIVAN) 0 5 mg tablet Take 0 5 mg by mouth in the morning and 0 5 mg in the evening  !! magnesium Oxide (MAG-OX) 400 mg TABS Take 400 mg by mouth daily      !! magnesium oxide (MAG-OX) 400 mg Take 400 mg by mouth in the morning  metFORMIN (GLUCOPHAGE) 1000 MG tablet Take 1 tablet by mouth 2 (two) times a day with meals      omeprazole (PriLOSEC) 20 mg delayed release capsule every 24 hours      ondansetron (ZOFRAN) 8 mg tablet Take 1 tablet (8 mg total) by mouth every 8 (eight) hours as needed for nausea or vomiting  Qty: 20 tablet, Refills: 1    Associated Diagnoses: Endometrial cancer (HCC)      Rosuvastatin Calcium 20 MG CPSP Take 1 tablet by mouth daily      sertraline (ZOLOFT) 50 mg tablet Take 50 mg by mouth daily      sitaGLIPtin (JANUVIA) 100 mg tablet Take 1 tablet by mouth daily      Bacillus Coagulans-Inulin (Probiotic) 1-250 BILLION-MG CAPS as directed      BD Pen Needle Yoana 2nd Gen 32G X 4 MM MISC 2 (two) times a day      Contour Next Test test strip 2 (two) times a day Test      ibuprofen (MOTRIN) 200 mg tablet Take 400 mg by mouth every 6 (six) hours as needed for mild pain      Lenvatinib, 10 MG Daily Dose, (Lenvima, 10 MG Daily Dose,) 10 MG CPPK Take 10 mg by mouth daily  Qty: 30 each, Refills: 2    Associated Diagnoses: Endometrial cancer (HCC)      lidocaine-prilocaine (EMLA) cream Apply to port site 30 min prior to labs/chemo  Qty: 30 g, Refills: 2    Associated Diagnoses: Endometrial cancer (HCC)      Microlet Lancets MISC 2 (two) times a day Test      Toujeo SoloStar 300 units/mL CONCENTRATED U-300 injection pen (1-unit dial) every 12 (twelve) hours 34 units in the am and 36 units at night       !! - Potential duplicate medications found  Please discuss with provider            No discharge procedures on file      PDMP Review       Value Time User    PDMP Reviewed  Yes 3/1/2022  1:54 PM Jr Alejandro PA-C          ED Provider  Electronically Signed by           Neida Phelan DO  09/13/22 2924

## 2022-09-12 NOTE — TELEPHONE ENCOUNTER
Call placed to patient to f/u on ED visit for HA/HTN  [facility] requesting call back

## 2022-09-12 NOTE — ED PROVIDER NOTES
History  Chief Complaint   Patient presents with    Headache     X3 weeks, thought it was sinus infection, treated with z-pack, then given robaxin, no relief, hx of ovarian cancer, last chemo 22; ibuprofen/gabapentin taken at 210     69-year-old female with history of high-grade endometrial cancer currently on immunotherapy status post multiple rounds of chemotherapy and immune therapy seeing Dr Shelia Fisher presents with 3 weeks of worsening headache, initially treated with a Z-Robi and Robaxin for possible sinus infection with continued symptoms  Taking ibuprofen and gabapentin without relief      History provided by:  Patient   used: No    Headache  Pain location:  Frontal  Quality:  Dull  Severity currently:  10  Severity at highest:  9/10  Onset quality:  Gradual  Duration:  3 weeks  Timing: waxing and waning  Progression:  Waxing and waning  Chronicity:  New  Relieved by:  Nothing  Worsened by:  Neck movement (laying down)  Ineffective treatments:  NSAIDs and prescription medications  Associated symptoms: fatigue    Associated symptoms: no abdominal pain, no back pain, no cough, no diarrhea, no dizziness, no fever, no photophobia, no sore throat and no weakness    Associated symptoms comment:  Active ca      Prior to Admission Medications   Prescriptions Last Dose Informant Patient Reported? Taking? BD Pen Needle Yoana 2nd Gen 32G X 4 MM MISC  Self Yes No   Si (two) times a day   Bacillus Coagulans-Inulin (Probiotic) 1-250 BILLION-MG CAPS  Self Yes No   Sig: as directed   Contour Next Test test strip  Self Yes No   Si (two) times a day Test   LORazepam (ATIVAN) 0 5 mg tablet  Self Yes No   Sig: Take 0 5 mg by mouth in the morning and 0 5 mg in the evening     Lenvatinib, 10 MG Daily Dose, (Lenvima, 10 MG Daily Dose,) 10 MG CPPK   No No   Sig: Take 10 mg by mouth daily   Microlet Lancets MISC  Self Yes No   Si (two) times a day Test   Rosuvastatin Calcium 20 MG CPSP  Self Yes No   Sig: Take 1 tablet by mouth daily   Toujeo SoloStar 300 units/mL CONCENTRATED U-300 injection pen (1-unit dial)  Self Yes No   Sig: every 12 (twelve) hours 34 units in the am and 36 units at night   acetaminophen (TYLENOL) 650 mg CR tablet  Self Yes No   Sig: Take 1,300 mg by mouth in the morning and 1,300 mg before bedtime  fexofenadine (ALLEGRA) 180 MG tablet  Self Yes No   Sig: every 24 hours   furosemide (LASIX) 40 mg tablet  Self Yes No   Sig: Take 1 tablet by mouth daily   gabapentin (NEURONTIN) 300 mg capsule  Self Yes No   Sig: Take 300 mg by mouth 3 (three) times a day   ibuprofen (MOTRIN) 200 mg tablet  Self Yes No   Sig: Take 400 mg by mouth every 6 (six) hours as needed for mild pain   lidocaine-prilocaine (EMLA) cream  Self No No   Sig: Apply to port site 30 min prior to labs/chemo   lisinopril (ZESTRIL) 20 mg tablet  Self Yes No   Sig: Take 1 tablet by mouth daily   magnesium Oxide (MAG-OX) 400 mg TABS   Yes No   Sig: Take 400 mg by mouth daily   magnesium oxide (MAG-OX) 400 mg  Self Yes No   Sig: Take 400 mg by mouth in the morning     metFORMIN (GLUCOPHAGE) 1000 MG tablet  Self Yes No   Sig: Take 1 tablet by mouth 2 (two) times a day with meals   omeprazole (PriLOSEC) 20 mg delayed release capsule  Self Yes No   Sig: every 24 hours   ondansetron (ZOFRAN) 8 mg tablet   No No   Sig: Take 1 tablet (8 mg total) by mouth every 8 (eight) hours as needed for nausea or vomiting   oxyCODONE (Roxicodone) 5 immediate release tablet   No No   Sig: Take 1 tablet (5 mg total) by mouth every 6 (six) hours as needed for moderate pain Max Daily Amount: 20 mg   sertraline (ZOLOFT) 50 mg tablet  Self Yes No   Sig: Take 50 mg by mouth daily   sitaGLIPtin (JANUVIA) 100 mg tablet  Self Yes No   Sig: Take 1 tablet by mouth daily      Facility-Administered Medications: None       Past Medical History:   Diagnosis Date    Anxiety     Arthritis     Back pain     Cancer ([facility] [address] )     Depression     Diabetes mellitus ([place] [address] )     Disease of thyroid gland     Endometrial cancer ([place] [address] )     GERD (gastroesophageal reflux disease)     Hyperlipidemia associated with type 2 diabetes mellitus ([place] [address] )     Hypertension     Morbid obesity with BMI of 40 0-44 9, adult ([place] [address] )     Type 2 diabetes mellitus without complication ([place] [address] )     Urinary incontinence     Wears glasses        Past Surgical History:   Procedure Laterality Date    ABDOMINAL ADHESION SURGERY N/A 5/31/2022    Procedure: Jhon Olszewski;  Surgeon: Tatiana Gill MD;  [facility];  Service: Gynecology Oncology    CATARACT EXTRACTION W/ INTRAOCULAR LENS IMPLANT Bilateral     CHOLECYSTECTOMY OPEN      CYSTOSCOPY N/A 5/31/2022    Procedure: Juluis Door;  Surgeon: Tatiana Gill MD;  [facility];  Service: Gynecology Oncology    IR PORT PLACEMENT  03/07/2022    WI LAPAROSCOPY W TOT HYSTERECTUTERUS <=250 GRAM  W TUBE/OVARY N/A 5/31/2022    Procedure: ROBOTIC ASSISTED TOTAL LAPAROSCOPIC HYSTERECTOMY, RIGHT SALPINGO-OOPHORECTOMY, LEFT SALPINGECTOMY,;  Surgeon: Tatiana Gill MD;  [facility];  Service: Gynecology Oncology    SALPINGOOPHORECTOMY N/A     only had one removed and not sure which    TONSILLECTOMY         History reviewed  No pertinent family history  I have reviewed and agree with the history as documented  E-Cigarette/Vaping    E-Cigarette Use Never User      E-Cigarette/Vaping Substances     Social History     Tobacco Use    Smoking status: Never Smoker    Smokeless tobacco: Never Used   Vaping Use    Vaping Use: Never used   Substance Use Topics    Alcohol use: Never    Drug use: Never       Review of Systems   Constitutional: Positive for fatigue  Negative for appetite change and fever  HENT: Negative for rhinorrhea and sore throat  Eyes: Negative for photophobia and visual disturbance  Respiratory: Negative for cough, chest tightness and wheezing  Cardiovascular: Negative for chest pain, palpitations and leg swelling  Gastrointestinal: Negative for abdominal distention, abdominal pain, blood in stool, constipation and diarrhea  Genitourinary: Negative for dysuria, flank pain, frequency, hematuria and urgency  Musculoskeletal: Negative for back pain  Skin: Negative for rash  Neurological: Positive for headaches  Negative for dizziness and weakness  All other systems reviewed and are negative  Physical Exam  Physical Exam  Vitals and nursing note reviewed  Constitutional:       Appearance: She is well-developed  HENT:      Head: Normocephalic and atraumatic  Eyes:      Pupils: Pupils are equal, round, and reactive to light  Neck:      Comments: Diffuse tenderness throughout the neck  Cardiovascular:      Rate and Rhythm: Normal rate and regular rhythm  Heart sounds: No murmur heard  No friction rub  No gallop  Pulmonary:      Effort: Pulmonary effort is normal       Breath sounds: No wheezing or rales  Chest:      Chest wall: No tenderness  Abdominal:      General: There is no distension  Palpations: Abdomen is soft  There is no mass  Tenderness: There is no guarding or rebound  Musculoskeletal:      Cervical back: Normal range of motion and neck supple  Tenderness present  No rigidity  Skin:     General: Skin is warm and dry  Neurological:      General: No focal deficit present  Mental Status: She is alert and oriented to person, place, and time  Mental status is at baseline  Cranial Nerves: No cranial nerve deficit  Sensory: No sensory deficit           Vital Signs  ED Triage Vitals [09/11/22 2357]   Temperature Pulse Respirations Blood Pressure SpO2   98 2 °F (36 8 °C) 104 18 (!) 222/93 92 %      Temp Source Heart Rate Source Patient Position - Orthostatic VS BP Location FiO2 (%)   Temporal Monitor Lying Left arm --      Pain Score       9           Vitals:    09/12/22 0130 09/12/22 0400 09/12/22 0430 09/12/22 0500   BP: 161/71 (!) 182/79 169/70 162/70   Pulse: 91 78 78 72   Patient Position - Orthostatic VS:             Visual Acuity      ED Medications  Medications   diphenhydrAMINE (BENADRYL) injection 25 mg (25 mg Intravenous Given 9/12/22 0032)   metoclopramide (REGLAN) injection 10 mg (10 mg Intravenous Given 9/12/22 0032)   magnesium sulfate 2 g/50 mL IVPB (premix) 2 g (0 g Intravenous Stopped 9/12/22 0133)   morphine injection 4 mg (4 mg Intravenous Given 9/12/22 0032)   sodium chloride 0 9 % bolus 1,000 mL (0 mL Intravenous Stopped 9/12/22 0411)   iohexol (OMNIPAQUE) 350 MG/ML injection (MULTI-DOSE) 100 mL (90 mL Intravenous Given 9/12/22 0203)   acetaminophen (TYLENOL) tablet 975 mg (975 mg Oral Given 9/12/22 0340)       Diagnostic Studies  Results Reviewed     Procedure Component Value Units Date/Time    HS Troponin I 2hr [[id number]]  (Normal) Collected: 09/12/22 0243    Lab Status: Final result Specimen: Blood from Arm, Left Updated: 09/12/22 0315     hs TnI 2hr 7 ng/L      Delta 2hr hsTnI 0 ng/L     D-dimer, quantitative [[id number]]  (Abnormal) Collected: 09/12/22 0014    Lab Status: Final result Specimen: Blood from Arm, Left Updated: 09/12/22 0059     D-Dimer, Quant 2 60 ug/ml FEU     Narrative: In the evaluation for possible pulmonary embolism, in the appropriate (Well's Score of 4 or less) patient, the age adjusted d-dimer cutoff for this patient can be calculated as:    Age x 0 01 (in ug/mL) for Age-adjusted D-dimer exclusion threshold for a patient over 50 years      HS Troponin 0hr (reflex protocol) [[id number]]  (Normal) Collected: 09/12/22 0014    Lab Status: Final result Specimen: Blood from Arm, Left Updated: 09/12/22 0051     hs TnI 0hr 7 ng/L     Basic metabolic panel [[id number]]  (Abnormal) Collected: 09/12/22 0014    Lab Status: Final result Specimen: Blood from Arm, Left Updated: 09/12/22 0039     Sodium 140 mmol/L      Potassium 3 6 mmol/L      Chloride 102 mmol/L CO2 24 mmol/L      ANION GAP 14 mmol/L      BUN 13 mg/dL      Creatinine 1 21 mg/dL      Glucose 205 mg/dL      Calcium 9 3 mg/dL      eGFR 46 ml/min/1 73sq m     Narrative:      [place] guidelines for Chronic Kidney Disease (CKD):     Stage 1 with normal or high GFR (GFR > 90 mL/min/1 73 square meters)    Stage 2 Mild CKD (GFR = 60-89 mL/min/1 73 square meters)    Stage 3A Moderate CKD (GFR = 45-59 mL/min/1 73 square meters)    Stage 3B Moderate CKD (GFR = 30-44 mL/min/1 73 square meters)    Stage 4 Severe CKD (GFR = 15-29 mL/min/1 73 square meters)    Stage 5 End Stage CKD (GFR <15 mL/min/1 73 square meters)  Note: GFR calculation is accurate only with a steady state creatinine    Protime-INR [[id number]]  (Normal) Collected: 09/12/22 0014    Lab Status: Final result Specimen: Blood from Arm, Left Updated: 09/12/22 0038     Protime 13 0 seconds      INR 0 91    APTT [[id number]]  (Normal) Collected: 09/12/22 0014    Lab Status: Final result Specimen: Blood from Arm, Left Updated: 09/12/22 0038     PTT 23 seconds     CBC and differential [[id number]]  (Abnormal) Collected: 09/12/22 0014    Lab Status: Final result Specimen: Blood from Arm, Left Updated: 09/12/22 0024     WBC 8 62 Thousand/uL      RBC 3 32 Million/uL      Hemoglobin 9 7 g/dL      Hematocrit 31 6 %      MCV 95 fL      MCH 29 2 pg      MCHC 30 7 g/dL      RDW 18 6 %      MPV 9 9 fL      Platelets 548 Thousands/uL      nRBC 0 /100 WBCs      Neutrophils Relative 62 %      Immat GRANS % 1 %      Lymphocytes Relative 20 %      Monocytes Relative 15 %      Eosinophils Relative 2 %      Basophils Relative 0 %      Neutrophils Absolute 5 38 Thousands/µL      Immature Grans Absolute 0 04 Thousand/uL      Lymphocytes Absolute 1 71 Thousands/µL      Monocytes Absolute 1 31 Thousand/µL      Eosinophils Absolute 0 15 Thousand/µL      Basophils Absolute 0 03 Thousands/µL                  CTA ED chest PE Study   Final Result by Suzie French MD (09/12 6907)   No pulmonary embolism or aortic dissection  No effusion, airspace disease, or pneumothorax  Unchanged lung nodules  Hypodense hepatic lesions concerning for metastasis again noted  Workstation performed: [id number]         CT head without contrast   Final Result by Suzie French MD (09/12 0044)      No acute intracranial abnormality  Workstation performed: [id number]                    Procedures  Procedures         ED Course  ED Course as of 09/12/22 0768   [facility] Sep 12, 2022   0015 Blood Pressure(!): 222/93   0028 Procedure Note: EKG  Date/Time: 09/12/22 12:26 AM   Performed by: Jm Justice  Authorized by: Jm Justice  Indications / Diagnosis: Hypertenstion , headache  ECG reviewed by me, the ED Provider: yes   The EKG demonstrates:  Rhythm:  sinus tach  Intervals: normal  Axis: normal axis  QRS/Blocks:  incomplete RBBB  ST Changes: No acute ST Changes, no STD/ANDREA    Similar to previous       0028 Hemoglobin(!): 9 7  base   0028 Platelet Count(!): 212  base   0113 Headache feeling better, did drop her O2 to 88 after receiving morphine, currently 95% on 2 L O2 by nasal cannula   0423 Headache improved no further O2 requirement, discussed with patient she will need to follow-up with her primary care provider for further evaluation of her recurrent headache also discussed possible adjustment in her blood pressure control though that may be related to her acute symptoms   0508 Patient with mild nocturnal hypoxia, improved with patient is awake, no shortness of breath, no chest pain, CT without any acute abnormalities, will follow up with pulm for formal sleep study                                             MDM  Number of Diagnoses or Management Options  Headache: new and requires workup  Diagnosis management comments: 75year old female currently on chemotherapy for endometrial cancer sees Dr Lakia Henriquez with [facility] gynecologic oncology presents for evaluation of worsening headache x3 weeks, given history of cancer that is metastatic to multiple sites will obtain CT head, will treat symptomatically and re-evaluate       Amount and/or Complexity of Data Reviewed  Clinical lab tests: ordered and reviewed  Tests in the radiology section of CPT®: ordered and reviewed  Tests in the medicine section of CPT®: ordered and reviewed  Decide to obtain previous medical records or to obtain history from someone other than the patient: yes  Review and summarize past medical records: yes  Independent visualization of images, tracings, or specimens: yes        Disposition  Final diagnoses:   Headache   Nocturnal hypoxia     Time reflects when diagnosis was documented in both MDM as applicable and the Disposition within this note     Time User Action Codes Description Comment    9/12/2022  3:59 AM Fang Robles [R51 9] Headache     9/12/2022  4:57 AM Fang Robles [G47 34] Nocturnal hypoxia       ED Disposition     ED Disposition   Discharge    Condition   Stable    Date/Time   Mon Sep 12, 2022  4:24 AM    Comment   Caro Ramos discharge to home/self care                 Follow-up Information     Follow up With Specialties Details Why Contact Info Additional DO [facility] Schedule an appointment as soon as possible for a visit   [address] Emergency Department Emergency Medicine  If symptoms worsen [address] [place], [address]  [phone] [address] Emergency Department, [address] [place] [address], [address] [place] [address] [facility] Pulmonology Schedule an appointment as soon as possible for a visit   Janelle Millard [address] [phone]  [address] [facility] Pulmonary [address], [place] , [place], [place], [address]   [phone]          Patient's Medications   Discharge Prescriptions    No medications on file           PDMP Review       Value Time User    PDMP Reviewed  Yes 3/1/2022  1:54 PM Laurinda Lundborg, PA-C          ED Provider  Electronically Signed by           Chelsey Vanegas DO  09/12/22 Gilberto Snowden DO  09/12/22 2752

## 2022-09-13 VITALS
WEIGHT: 230 LBS | RESPIRATION RATE: 18 BRPM | TEMPERATURE: 97.8 F | SYSTOLIC BLOOD PRESSURE: 115 MMHG | DIASTOLIC BLOOD PRESSURE: 71 MMHG | OXYGEN SATURATION: 94 % | HEART RATE: 80 BPM | BODY MASS INDEX: 40.75 KG/M2 | HEIGHT: 63 IN

## 2022-09-13 LAB
ANION GAP SERPL CALCULATED.3IONS-SCNC: 9 MMOL/L (ref 4–13)
BASOPHILS # BLD AUTO: 0.02 THOUSANDS/ΜL (ref 0–0.1)
BASOPHILS NFR BLD AUTO: 0 % (ref 0–1)
BUN SERPL-MCNC: 9 MG/DL (ref 5–25)
CALCIUM SERPL-MCNC: 8.8 MG/DL (ref 8.3–10.1)
CHLORIDE SERPL-SCNC: 103 MMOL/L (ref 96–108)
CO2 SERPL-SCNC: 29 MMOL/L (ref 21–32)
CREAT SERPL-MCNC: 0.74 MG/DL (ref 0.6–1.3)
EOSINOPHIL # BLD AUTO: 0.12 THOUSAND/ΜL (ref 0–0.61)
EOSINOPHIL NFR BLD AUTO: 2 % (ref 0–6)
ERYTHROCYTE [DISTWIDTH] IN BLOOD BY AUTOMATED COUNT: 18.6 % (ref 11.6–15.1)
GFR SERPL CREATININE-BSD FRML MDRD: 83 ML/MIN/1.73SQ M
GLUCOSE SERPL-MCNC: 115 MG/DL (ref 65–140)
GLUCOSE SERPL-MCNC: 83 MG/DL (ref 65–140)
GLUCOSE SERPL-MCNC: 87 MG/DL (ref 65–140)
HCT VFR BLD AUTO: 28.2 % (ref 34.8–46.1)
HGB BLD-MCNC: 8.8 G/DL (ref 11.5–15.4)
IMM GRANULOCYTES # BLD AUTO: 0.04 THOUSAND/UL (ref 0–0.2)
IMM GRANULOCYTES NFR BLD AUTO: 1 % (ref 0–2)
LYMPHOCYTES # BLD AUTO: 1.5 THOUSANDS/ΜL (ref 0.6–4.47)
LYMPHOCYTES NFR BLD AUTO: 22 % (ref 14–44)
MAGNESIUM SERPL-MCNC: 2.1 MG/DL (ref 1.6–2.6)
MCH RBC QN AUTO: 29.1 PG (ref 26.8–34.3)
MCHC RBC AUTO-ENTMCNC: 31.2 G/DL (ref 31.4–37.4)
MCV RBC AUTO: 93 FL (ref 82–98)
MONOCYTES # BLD AUTO: 1.06 THOUSAND/ΜL (ref 0.17–1.22)
MONOCYTES NFR BLD AUTO: 16 % (ref 4–12)
NEUTROPHILS # BLD AUTO: 3.95 THOUSANDS/ΜL (ref 1.85–7.62)
NEUTS SEG NFR BLD AUTO: 59 % (ref 43–75)
NRBC BLD AUTO-RTO: 0 /100 WBCS
PLATELET # BLD AUTO: 121 THOUSANDS/UL (ref 149–390)
PMV BLD AUTO: 9.7 FL (ref 8.9–12.7)
POTASSIUM SERPL-SCNC: 3.1 MMOL/L (ref 3.5–5.3)
RBC # BLD AUTO: 3.02 MILLION/UL (ref 3.81–5.12)
SODIUM SERPL-SCNC: 141 MMOL/L (ref 135–147)
WBC # BLD AUTO: 6.69 THOUSAND/UL (ref 4.31–10.16)

## 2022-09-13 PROCEDURE — 80048 BASIC METABOLIC PNL TOTAL CA: CPT | Performed by: INTERNAL MEDICINE

## 2022-09-13 PROCEDURE — 85025 COMPLETE CBC W/AUTO DIFF WBC: CPT | Performed by: INTERNAL MEDICINE

## 2022-09-13 PROCEDURE — 94760 N-INVAS EAR/PLS OXIMETRY 1: CPT

## 2022-09-13 PROCEDURE — 83735 ASSAY OF MAGNESIUM: CPT | Performed by: INTERNAL MEDICINE

## 2022-09-13 PROCEDURE — 99217 PR OBSERVATION CARE DISCHARGE MANAGEMENT: CPT | Performed by: HOSPITALIST

## 2022-09-13 PROCEDURE — 82948 REAGENT STRIP/BLOOD GLUCOSE: CPT

## 2022-09-13 RX ORDER — AMLODIPINE BESYLATE 5 MG/1
5 TABLET ORAL DAILY
Qty: 30 TABLET | Refills: 0 | Status: SHIPPED | OUTPATIENT
Start: 2022-09-14

## 2022-09-13 RX ORDER — POTASSIUM CHLORIDE 20 MEQ/1
40 TABLET, EXTENDED RELEASE ORAL ONCE
Status: COMPLETED | OUTPATIENT
Start: 2022-09-13 | End: 2022-09-13

## 2022-09-13 RX ORDER — LISINOPRIL 40 MG/1
40 TABLET ORAL DAILY
Qty: 30 TABLET | Refills: 0 | Status: SHIPPED | OUTPATIENT
Start: 2022-09-14

## 2022-09-13 RX ORDER — AMOXICILLIN AND CLAVULANATE POTASSIUM 875; 125 MG/1; MG/1
1 TABLET, FILM COATED ORAL EVERY 12 HOURS SCHEDULED
Status: DISCONTINUED | OUTPATIENT
Start: 2022-09-13 | End: 2022-09-13 | Stop reason: HOSPADM

## 2022-09-13 RX ORDER — KETOROLAC TROMETHAMINE 30 MG/ML
15 INJECTION, SOLUTION INTRAMUSCULAR; INTRAVENOUS ONCE
Status: COMPLETED | OUTPATIENT
Start: 2022-09-13 | End: 2022-09-13

## 2022-09-13 RX ORDER — AMOXICILLIN AND CLAVULANATE POTASSIUM 875; 125 MG/1; MG/1
1 TABLET, FILM COATED ORAL EVERY 12 HOURS SCHEDULED
Qty: 20 TABLET | Refills: 0 | Status: SHIPPED | OUTPATIENT
Start: 2022-09-13 | End: 2022-09-23

## 2022-09-13 RX ADMIN — FUROSEMIDE 40 MG: 40 TABLET ORAL at 09:12

## 2022-09-13 RX ADMIN — POTASSIUM CHLORIDE 40 MEQ: 1500 TABLET, EXTENDED RELEASE ORAL at 09:15

## 2022-09-13 RX ADMIN — PANTOPRAZOLE SODIUM 40 MG: 40 TABLET, DELAYED RELEASE ORAL at 09:15

## 2022-09-13 RX ADMIN — INSULIN GLARGINE 36 UNITS: 100 INJECTION, SOLUTION SUBCUTANEOUS at 09:13

## 2022-09-13 RX ADMIN — LORAZEPAM 0.5 MG: 0.5 TABLET ORAL at 09:14

## 2022-09-13 RX ADMIN — LORATADINE 10 MG: 10 TABLET ORAL at 09:12

## 2022-09-13 RX ADMIN — AMOXICILLIN AND CLAVULANATE POTASSIUM 1 TABLET: 875; 125 TABLET, FILM COATED ORAL at 10:02

## 2022-09-13 RX ADMIN — SERTRALINE HYDROCHLORIDE 50 MG: 50 TABLET ORAL at 09:15

## 2022-09-13 RX ADMIN — KETOROLAC TROMETHAMINE 15 MG: 30 INJECTION, SOLUTION INTRAMUSCULAR; INTRAVENOUS at 09:15

## 2022-09-13 RX ADMIN — GABAPENTIN 300 MG: 300 CAPSULE ORAL at 09:13

## 2022-09-13 RX ADMIN — AMLODIPINE BESYLATE 5 MG: 5 TABLET ORAL at 09:12

## 2022-09-13 RX ADMIN — HEPARIN SODIUM 5000 UNITS: 5000 INJECTION INTRAVENOUS; SUBCUTANEOUS at 05:40

## 2022-09-13 RX ADMIN — MAGNESIUM OXIDE TAB 400 MG (241.3 MG ELEMENTAL MG) 400 MG: 400 (241.3 MG) TAB at 09:14

## 2022-09-13 RX ADMIN — LISINOPRIL 40 MG: 20 TABLET ORAL at 09:14

## 2022-09-13 NOTE — UTILIZATION REVIEW
Initial Clinical Review    Admission: Date/Time/Statement: 9/12/22 2131 observation    Admission Orders (From admission, onward)     Ordered        09/12/22 2131  Place in Observation  Once                      Orders Placed This Encounter   Procedures    Place in Observation     Standing Status:   Standing     Number of Occurrences:   1     Order Specific Question:   Level of Care     Answer:   Med Surg [16]     ED Arrival Information     Expected   -    Arrival   9/12/2022 15:59    Acuity   Urgent            Means of arrival   Walk-In    Escorted by   Family Member    Service   Hospitalist    Admission type   Urgent            Arrival complaint   headache, hbp           Chief Complaint   Patient presents with    Headache     Headache x3 weeks  Here yesterday for same  Tylenol and muscle relaxer taken at 1000 today       Initial Presentation: 75 y o  female from home to ED admitted to observation due to hypertensive urgency/headache  Has Stage IV B high-grade endometrial cancer, liver, retroperitoneal lymph node, lung metastatic disease  Re presented to ED due to persistent headache, started intermittently about 2 weeks ago, constant last 2 to 3 days  Seen in ED 9/11/22  Has intermittent episodes of speech difficulty  Hypertensive despite increased lisinopril  One exam bilateral maxillary and frontal sinus tenderness  Mucous membranes dry  Hypertensive  In the ED given Reglan, Benadryl, metoprolol and hydralazine  Plan is  BP control with goal of 512 systolic, hydralazine for SBP > 200, add amlodipine, continue home lisinopril and lasix  Headache control and IV magnesium ordered       ED Triage Vitals [09/12/22 1604]   Temperature Pulse Respirations Blood Pressure SpO2   98 9 °F (37 2 °C) 95 20 (!) 231/102 95 %      Temp Source Heart Rate Source Patient Position - Orthostatic VS BP Location FiO2 (%)   Temporal Monitor Sitting Right arm --      Pain Score       9          Wt Readings from Last 1 Encounters:   09/12/22 104 kg (230 lb)     Additional Vital Signs:   09/13/22 07:38:31 -- 80 -- 167/77 107 94 % -- -- -- --   09/13/22 05:55:21 -- 77 -- 165/73 104 94 % -- -- -- --   09/13/22 0038 -- -- -- -- -- 95 % 28 2 L/min Nasal cannula --   09/12/22 23:05:44 97 8 °F (36 6 °C) 86 16 167/83 111 92 % -- -- None (Room air) Sitting   09/12/22 2147 -- 80 20 166/56 104 94 % -- -- None (Room air) Sitting   09/12/22 2118 -- 80 21 168/72 -- 94 % -- -- None (Room air) Sitting   09/12/22 1907 -- 91 18 194/82 Abnormal  118 93 % -- -- None (Room air)        Pertinent Labs/Diagnostic Test Results:   No orders to display     9/12/22 ct head No acute intracranial abnormality    9/12/22 cta chest No pulmonary embolism or aortic dissection  No effusion, airspace disease, or pneumothorax  Unchanged lung nodules  Hypodense hepatic lesions concerning for metastasis again noted     Results from last 7 days   Lab Units 09/13/22 0552 09/12/22 1957 09/12/22 1216 09/12/22 0014 09/06/22  1152   WBC Thousand/uL 6 69 8 73 7 57 8 62 4 85   HEMOGLOBIN g/dL 8 8* 9 1* 9 1* 9 7* 9 0*   HEMATOCRIT % 28 2* 28 5* 31 9* 31 6* 31 0*   PLATELETS Thousands/uL 121* 125* 121* 129* 129*   NEUTROS ABS Thousands/µL 3 95 6 64 5 14 5 38 3 02     Results from last 7 days   Lab Units 09/13/22 0552 09/12/22 1957 09/12/22 1216 09/12/22  0014 09/06/22  1152   SODIUM mmol/L 141 139 141 140 139   POTASSIUM mmol/L 3 1* 3 7 3 6 3 6 3 3*   CHLORIDE mmol/L 103 101 102 102 104   CO2 mmol/L 29 26 29 24 26   ANION GAP mmol/L 9 12 10 14* 9   BUN mg/dL 9 11 11 13 13   CREATININE mg/dL 0 74 0 83 1 01 1 21 0 94   EGFR ml/min/1 73sq m 83 72 57 46 62   CALCIUM mg/dL 8 8 9 0 8 9 9 3 8 8   MAGNESIUM mg/dL 2 1 1 7 1 9  --  1 5*     Results from last 7 days   Lab Units 09/12/22  1216 09/06/22  1152   AST U/L 22 18   ALT U/L 25 28   ALK PHOS U/L 106 94   TOTAL PROTEIN g/dL 6 6 6 2*   ALBUMIN g/dL 3 1* 3 1*   TOTAL BILIRUBIN mg/dL 0 40 0 55     Results from last 7 days   Lab Units 09/13/22  0740 09/12/22  2321   POC GLUCOSE mg/dl 83 109     Results from last 7 days   Lab Units 09/13/22  0552 09/12/22 1957 09/12/22  0014   GLUCOSE RANDOM mg/dL 87 124 205*     Results from last 7 days   Lab Units 09/12/22 1957 09/12/22  0243 09/12/22  0014   HS TNI 0HR ng/L 6  --  7   HS TNI 2HR ng/L  --  7  --    HSTNI D2 ng/L  --  0  --      Results from last 7 days   Lab Units 09/12/22  0014   D-DIMER QUANTITATIVE ug/ml FEU 2 60*     Results from last 7 days   Lab Units 09/12/22  0014   PROTIME seconds 13 0   INR  0 91   PTT seconds 23     Results from last 7 days   Lab Units 09/12/22  1216   TSH 3RD GENERATON uIU/mL 1 957     Results from last 7 days   Lab Units 09/12/22  1216   LIPASE u/L 60*   AMYLASE IU/L 28     Results from last 7 days   Lab Units 09/12/22  1216   CREATININE UR mg/dL 255 0   PROTEIN UR mg/dL 60   PROT/CREAT RATIO UR  0 24*       ED Treatment:   Medication Administration from 09/12/2022 1559 to 09/12/2022 2212       Date/Time Order Dose Route Action Comments     09/12/2022 2005 metoclopramide (REGLAN) injection 10 mg 10 mg Intravenous Given      09/12/2022 2005 diphenhydrAMINE (BENADRYL) injection 25 mg 25 mg Intravenous Given      09/12/2022 2005 metoprolol (LOPRESSOR) injection 10 mg 10 mg Intravenous Given      09/12/2022 2119 hydrALAZINE (APRESOLINE) injection 10 mg 10 mg Intravenous Given         Past Medical History:   Diagnosis Date    Anxiety     Arthritis     Back pain     Cancer ([place] [address] )     Depression     Diabetes mellitus ([place] [address] )     Disease of thyroid gland     Endometrial cancer ([place] [address] )     GERD (gastroesophageal reflux disease)     Hyperlipidemia associated with type 2 diabetes mellitus ([place] [address] )     Hypertension     Morbid obesity with BMI of 40 0-44 9, adult ([place] [address] )     Type 2 diabetes mellitus without complication ([place] [address] )     Urinary incontinence     Wears glasses      Present on Admission:   Endometrial cancer ([place] [address] )   Type 2 diabetes mellitus without complication ([facility] [address] )   Anemia associated with chemotherapy      Admitting Diagnosis: Hypertensive urgency [I16 0]  Headache [R51 9]  Age/Sex: 75 y o  female  Admission Orders:  Scheduled Medications:  amLODIPine, 5 mg, Oral, Daily  atorvastatin, 40 mg, Oral, Daily With Dinner  furosemide, 40 mg, Oral, Daily  gabapentin, 300 mg, Oral, TID  heparin (porcine), 5,000 Units, Subcutaneous, Q8H BELEN  insulin glargine, 36 Units, Subcutaneous, Q12H BELEN  insulin lispro, 1-6 Units, Subcutaneous, TID AC  insulin lispro, 1-6 Units, Subcutaneous, HS  lisinopril, 40 mg, Oral, Daily  loratadine, 10 mg, Oral, Daily  LORazepam, 0 5 mg, Oral, BID  magnesium oxide, 400 mg, Oral, Daily  pantoprazole, 40 mg, Oral, Early Morning  sertraline, 50 mg, Oral, Daily    magnesium sulfate IVPB (premix) SOLN 1 g  Dose: 1 g  Freq: Once Route: IV  Last Dose: Stopped (09/13/22 0010)  Start: 09/12/22 2200 End: 09/13/22 0010    Continuous IV Infusions:     PRN Meds:  acetaminophen, 650 mg, Oral, Q6H PRN - used x 1 9/12/22   hydrALAZINE, 5 mg, Intravenous, Q6H PRN  ondansetron, 4 mg, Intravenous, Q4H PRN          Network Utilization Review Department  ATTENTION: Please call with any questions or concerns to [phone] and carefully listen to the prompts so that you are directed to the right person  All voicemails are confidential   [facility] all requests for admission clinical reviews, approved or denied determinations and any other requests to dedicated fax number below belonging to the campus where the patient is receiving treatment   List of dedicated fax numbers for the Facilities:  [address] DENIALS (Administrative/Medical Necessity) [phone]   [address] (Maternity/NICU/Pediatrics) [phone]   [address] [place] [address] [address]   [address]  [phone]     [address] [address] [place] [address] Yadira Donovan [facility] [address]  [place] [address] 0718 [facility] 951 [phone]

## 2022-09-13 NOTE — ASSESSMENT & PLAN NOTE
Lab Results   Component Value Date    HGBA1C 8 3 (H) 05/11/2022       No results for input(s): POCGLU in the last 72 hours      Blood Sugar Average: Last 72 hrs:  · Home regimen:  Metformin 1000 mg b i d , Januvia, glargine 38 units a m , 36 units HS  · Hold home oral hypoglycemics   · SSI

## 2022-09-13 NOTE — PLAN OF CARE
Problem: Potential for Falls  Goal: Patient will remain free of falls  Description: INTERVENTIONS:  - Educate patient/family on patient safety including physical limitations  - Instruct patient to call for assistance with activity   - Consult OT/PT to assist with strengthening/mobility   - Keep Call bell within reach  - Keep bed low and locked with side rails adjusted as appropriate  - Keep care items and personal belongings within reach  - Initiate and maintain comfort rounds  - Make Fall Risk Sign visible to staff  - Offer Toileting every 2 Hours, in advance of need  - Initiate/Maintain alarm  - Obtain necessary fall risk management equipment: socks  Problem: PAIN - ADULT  Goal: Verbalizes/displays adequate comfort level or baseline comfort level  Description: Interventions:  - Encourage patient to monitor pain and request assistance  - Assess pain using appropriate pain scale  - Administer analgesics based on type and severity of pain and evaluate response  - Implement non-pharmacological measures as appropriate and evaluate response  - Consider cultural and social influences on pain and pain management  - Notify physician/advanced practitioner if interventions unsuccessful or patient reports new pain  Outcome: Progressing     Problem: INFECTION - ADULT  Goal: Absence or prevention of progression during hospitalization  Description: INTERVENTIONS:  - Assess and monitor for signs and symptoms of infection  - Monitor lab/diagnostic results  - Monitor all insertion sites, i e  indwelling lines, tubes, and drains  - Monitor endotracheal if appropriate and nasal secretions for changes in amount and color  - [place] appropriate cooling/warming therapies per order  - Administer medications as ordered  - Instruct and encourage patient and family to use good hand hygiene technique  - Identify and instruct in appropriate isolation precautions for identified infection/condition  Outcome: Progressing  Goal: Absence of fever/infection during neutropenic period  Description: INTERVENTIONS:  - Monitor WBC    Outcome: Progressing     Problem: SAFETY ADULT  Goal: Patient will remain free of falls  Description: INTERVENTIONS:  - Educate patient/family on patient safety including physical limitations  - Instruct patient to call for assistance with activity   - Consult OT/PT to assist with strengthening/mobility   - Keep Call bell within reach  - Keep bed low and locked with side rails adjusted as appropriate  - Keep care items and personal belongings within reach  - Initiate and maintain comfort rounds  - Make Fall Risk Sign visible to staff  - Offer Toileting every 2 Hours, in advance of need  - Initiate/Maintain alarm  - Obtain necessary fall risk management equipment: socks  - Apply yellow socks and bracelet for high fall risk patients  - Consider moving patient to room near nurses station  Outcome: Progressing  Goal: Maintain or return to baseline ADL function  Description: INTERVENTIONS:  -  Assess patient's ability to carry out ADLs; assess patient's baseline for ADL function and identify physical deficits which impact ability to perform ADLs (bathing, care of mouth/teeth, toileting, grooming, dressing, etc )  - Assess/evaluate cause of self-care deficits   - Assess range of motion  - Assess patient's mobility; develop plan if impaired  - Assess patient's need for assistive devices and provide as appropriate  - Encourage maximum independence but intervene and supervise when necessary  - Involve family in performance of ADLs  - Assess for home care needs following discharge   - Consider OT consult to assist with ADL evaluation and planning for discharge  - Provide patient education as appropriate  Outcome: Progressing  Goal: Maintains/Returns to pre admission functional level  Description: INTERVENTIONS:  - Perform BMAT or MOVE assessment daily    - Set and communicate daily mobility goal to care team and patient/family/caregiver  - Collaborate with rehabilitation services on mobility goals if consulted  - Perform Range of Motion 2 times a day  - Reposition patient every 2 hours  - Dangle patient 2 times a day  - Stand patient 2 times a day  - Ambulate patient 2 times a day  - Out of bed to chair 2 times a day   - Out of bed for meals 2  Problem: CARDIOVASCULAR - ADULT  Goal: Maintains optimal cardiac output and hemodynamic stability  Description: INTERVENTIONS:  - Monitor I/O, vital signs and rhythm  - Monitor for S/S and trends of decreased cardiac output  - Administer and titrate ordered vasoactive medications to optimize hemodynamic stability  - Assess quality of pulses, skin color and temperature  - Assess for signs of decreased coronary artery perfusion  - Instruct patient to report change in severity of symptoms  Outcome: Progressing     Problem: METABOLIC, FLUID AND ELECTROLYTES - ADULT  Goal: Electrolytes maintained within normal limits  Description: INTERVENTIONS:  - Monitor labs and assess patient for signs and symptoms of electrolyte imbalances  - Administer electrolyte replacement as ordered  - Monitor response to electrolyte replacements, including repeat lab results as appropriate  - Instruct patient on fluid and nutrition as appropriate  Outcome: Progressing     Problem: HEMATOLOGIC - ADULT  Goal: Maintains hematologic stability  Description: INTERVENTIONS  - Assess for signs and symptoms of bleeding or hemorrhage  - Monitor labs  - Administer supportive blood products/factors as ordered and appropriate  Outcome: Progressing     Problem: Nutrition/Hydration-ADULT  Goal: Nutrient/Hydration intake appropriate for improving, restoring or maintaining nutritional needs  Description: Monitor and assess patient's nutrition/hydration status for malnutrition  Collaborate with interdisciplinary team and initiate plan and interventions as ordered  Monitor patient's weight and dietary intake as ordered or per policy   Utilize nutrition screening tool and intervene as necessary  Determine patient's food preferences and provide high-protein, high-caloric foods as appropriate       INTERVENTIONS:  - Monitor oral intake, urinary output, labs, and treatment plans  - Assess nutrition and hydration status and recommend course of action  - Evaluate amount of meals eaten  - Assist patient with eating if necessary   - Allow adequate time for meals  - Recommend/ encourage appropriate diets, oral nutritional supplements, and vitamin/mineral supplements  - Order, calculate, and assess calorie counts as needed  - Recommend, monitor, and adjust tube feedings and TPN/PPN based on assessed needs  - Assess need for intravenous fluids  - Provide specific nutrition/hydration education as appropriate  - Include patient/family/caregiver in decisions related to nutrition  Outcome: Progressing    times a day  - Out of bed for toileting  - Record patient progress and toleration of activity level   Outcome: Progressing     - Apply yellow socks and bracelet for high fall risk patients  - Consider moving patient to room near nurses station  Outcome: Progressing

## 2022-09-13 NOTE — CASE MANAGEMENT
Case Management Assessment & Discharge Planning Note    Patient name Subha Cisnerosp  Location /-98 MRN [id number]  : 1955 Date 2022       Current Admission Date: 2022  Current Admission Diagnosis:Hypertensive urgency   Patient Active Problem List    Diagnosis Date Noted    Hypertensive urgency 2022    Headache 2022    Chemotherapy-induced thrombocytopenia 2022    Anemia associated with chemotherapy 2022    Postoperative state 2022    Endometrial cancer ([place] [address] ) 2022    Hyperlipidemia associated with type 2 diabetes mellitus ([place] [address] ) 2022    Type 2 diabetes mellitus without complication ([place] [address] )     Morbid obesity with BMI of 40 0-44 9, adult ([place] [address] ) 2022      LOS (days): 0  Geometric Mean LOS (GMLOS) (days):   Days to GMLOS:     OBJECTIVE:              Current admission status: Observation       Preferred Pharmacy:   [address]  Phone: [phone] Fax: [phone]    Primary Care Provider: Benigno Minaya DO    Primary Insurance: BLUE CROSS  Secondary Insurance:     ASSESSMENT:  Active Health Care Proxies    There are no active Health Care Proxies on file         Advance Directives  Does patient have a [address]?: No  Was patient offered paperwork?: Yes (informatio given)  Does patient currently have a Health Care decision maker?: Yes, please see Health Care Proxy section  Does patient have Advance Directives?: No  Was patient offered paperwork?: Yes (information given)  Primary Contact: Jada Brown Notice Signed: 22    Readmission Root Cause  30 Day Readmission: No    Patient Information  Admitted from[de-identified] Home  Mental Status: Alert  During Assessment patient was accompanied by: Not accompanied during assessment  Assessment information provided by[de-identified] Patient  Primary Caregiver: Self  Support Systems: Self, Spouse/significant other  [place] of Residence: [address] [facility] do you live in?: [address] entry access options   Select all that apply : Stairs  Number of steps to enter home : 3  Do the steps have railings?: No  Type of Current Residence: [place]  In the last 12 months, was there a time when you were not able to pay the mortgage or rent on time?: No  In the last 12 months, how many places have you lived?: 1  In the last 12 months, was there a time when you did not have a steady place to sleep or slept in a shelter (including now)?: No  Homeless/housing insecurity resource given?: N/A  Living Arrangements: Lives w/ Spouse/significant other  Is patient a ?: No    Activities of Daily Living Prior to Admission  Functional Status: Independent  Completes ADLs independently?: Yes  Ambulates independently?: Yes  Does patient use assisted devices?: No  Does patient currently own DME?: No  Does patient have a history of Outpatient Therapy (PT/OT)?: No  Does the patient have a history of Short-Term Rehab?: No  Does patient have a history of HHC?: No  Does patient currently have [facility]?: No         Patient Information Continued  Income Source: Unemployed  Does patient have prescription coverage?: Yes  Within the past 12 months, you worried that your food would run out before you got the money to buy more : Never true  Within the past 12 months, the food you bought just didn't last and you didn't have money to get more : Never true  Food insecurity resource given?: N/A  Does patient receive dialysis treatments?: No  Does patient have a history of substance abuse?: No  Does patient have a history of Mental Health Diagnosis?: No         Means of Transportation  Means of Transport to Appts[de-identified] Drives Self  In the past 12 months, has lack of transportation kept you from medical appointments or from getting medications?: No  Was application for public transport provided?: N/A        DISCHARGE DETAILS:    Discharge planning discussed with[de-identified] patient        CM contacted family/caregiver?: No- see comments (declined offer)  Were Treatment Team discharge recommendations reviewed with patient/caregiver?: Yes  Did patient/caregiver verbalize understanding of patient care needs?: Yes                Treatment Team Recommendation: Home  Discharge Destination Plan[de-identified] Home               Met with patient to review the role of CM and discusses discharge needs  Patient reports residing with her spouse in a ranch home with 3 ANDREA  She is independent of ADL's and uses no assistive device, She plans to return home and anticipated no assistive device

## 2022-09-13 NOTE — ASSESSMENT & PLAN NOTE
· Presented to the ER due to intermittent generalized headache for the past 3 weeks that has been constant over the past 2-3 days  · Seen in the ER on 09/11 and received headache cocktail/hypertensive medications  Headache improved so patient was not admitted to the hospital but then headache returned later that night  · Patient with hypertensive urgency most likely causing headaches  Improve BP  · Headache cocktail  · Will give a dose of toradol  Ct head negative

## 2022-09-13 NOTE — DISCHARGE SUMMARY
[place]  Discharge- Patrick Lantigua 1955, 75 y o  female MRN: [id number]  Unit/Bed#: -01 Encounter: [id number]  Primary Care Provider: Sandi Cool DO   Date and time admitted to hospital: 9/12/2022  6:30 PM    Headache  Assessment & Plan  · Presented to the ER due to intermittent generalized headache for the past 3 weeks that has been constant over the past 2-3 days  · Seen in the ER on 09/11 and received headache cocktail/hypertensive medications  Headache improved so patient was not admitted to the hospital but then headache returned later that night  · Patient with hypertensive urgency most likely causing headaches  Improve BP  · Headache cocktail  · Will give a dose of toradol  Ct head negative  Anemia associated with chemotherapy  Assessment & Plan  · Hemoglobin 9 1 on admission   · Baseline appears to be about 9-10   · Continue to monitor and transfuse if less than 7    Morbid obesity with BMI of 40 0-44 9, adult ([facility] [address] )  Assessment & Plan  Body mass index is 40 74 kg/m²  · Encouraged lifestyle changes    Type 2 diabetes mellitus without complication [facility])  Assessment & Plan  Lab Results   Component Value Date    HGBA1C 8 3 (H) 05/11/2022       No results for input(s): POCGLU in the last 72 hours      Blood Sugar Average: Last 72 hrs:  · Home regimen:  Metformin 1000 mg b i d , Januvia, glargine 38 units a m , 36 units HS  · Hold home oral hypoglycemics   · SSI    Endometrial cancer (HCC)  Assessment & Plan  · Stage IV B high-grade endometrial cancer, liver, retroperitoneal lymph node, lung metastatic disease  · Has received 7 cycles of carboplatin, Taxol and 3 cycles of bevacizumab  · Plan is for patient to be initiated on Keytruda and lenvatinib but has not been able to do so due to elevated BP  · Follows with Dr Lakia Henriquez from Gynecology Oncology     * Hypertensive urgency  Assessment & Plan  · Home regimen:  Lisinopril 20 mg daily, Lasix 40 mg daily  · Patient has been taking lisinopril 40 mg daily over the past couple of days to help manage BP  · Initial BP in the /102  Received hydralazine 10 mg and Lopressor 10 mg   BP improved to 168/72  · Goal blood pressure around 061 systolic due to not wanting to drop blood pressure rapidly  · Hydralazine 5 mg p r n  for systolic over 174  · Stable on lasix, lisinopril 40, norvasc 5  Hospital Course:     Jaimee Wolfe is a 75 y o  female patient who originally presented to the hospital on   Admission Orders (From admission, onward)     Ordered        09/12/22 2131  Place in Observation  Once                     due to      Hypertensive urgency  Patient was stabilized in the hospital and will take Lasix, lisinopril 40, Norvasc 5 mg  Patient's blood pressures have markedly improved  Further titration will be done as an outpatient  Patient had migraine while here, CT of the head was negative  Patient is known to have chronic migraine syndrome  Please see above list of diagnoses and related plan for additional information  Physical Exam:    GEN: No acute distress, comfortable, obese  HEEENT: No JVD, PERRLA, no scleral icterus  RESP: Lungs clear to auscultation bilaterally  CV: RRR, +s1/s2   ABD: SOFT NON TENDER, POSITIVE BOWEL SOUNDS, NO DISTENTION  PSYCH: CALM  NEURO: A X O X 3, NO FOCAL DEFICITS  SKIN: NO RASH  EXTREM: NO EDEMA      Condition at Discharge:  good      Discharge instructions/Information to patient and family:   See after visit summary for information provided to patient and family  Provisions for Follow-Up Care:  See after visit summary for information related to follow-up care and any pertinent home health orders  Disposition:     Home       Discharge Statement:  I spent 32 minutes discharging the patient  This time was spent on the day of discharge  I had direct contact with the patient on the day of discharge   Greater than 50% of the total time was spent examining patient, answering all patient questions, arranging and discussing plan of care with patient as well as directly providing post-discharge instructions  Additional time then spent on discharge activities  Discharge Medications:  See after visit summary for reconciled discharge medications provided to patient and family        ** Please Note: This note has been constructed using a voice recognition system **

## 2022-09-13 NOTE — OCCUPATIONAL THERAPY NOTE
Occupational Therapy Screen Note     Patient Name: Joy PEREZ Date: 9/13/2022  Problem List  Principal Problem:    Hypertensive urgency  Active Problems:    Endometrial cancer ([facility] )    Type 2 diabetes mellitus without complication ([facility] )    Morbid obesity with BMI of 40 0-44 9, adult ([facility] )    Anemia associated with chemotherapy    Headache              09/13/22 1155   OT Last Visit   OT Visit Date 09/13/22   Note Type   Note type Screen   Additional Comments OT Orders received  Chart reviewed  Nursing reports pt is independent in room with no acute OT needs  Will DC OT orders  Please reconsult should a change in functional status arise       Lulú Ta, OTR/L

## 2022-09-13 NOTE — PLAN OF CARE
Problem: Potential for Falls  Goal: Patient will remain free of falls  Description: INTERVENTIONS:  - Educate patient/family on patient safety including physical limitations  - Instruct patient to call for assistance with activity   - Consult OT/PT to assist with strengthening/mobility   - Keep Call bell within reach  - Keep bed low and locked with side rails adjusted as appropriate  - Keep care items and personal belongings within reach  - Initiate and maintain comfort rounds  - Make Fall Risk Sign visible to staff  - Offer Toileting every 2 Hours, in advance of need  - Initiate/Maintain alarm  - Obtain necessary fall risk management equipment:   - Apply yellow socks and bracelet for high fall risk patients  - Consider moving patient to room near nurses station  Outcome: Progressing     Problem: PAIN - ADULT  Goal: Verbalizes/displays adequate comfort level or baseline comfort level  Description: Interventions:  - Encourage patient to monitor pain and request assistance  - Assess pain using appropriate pain scale  - Administer analgesics based on type and severity of pain and evaluate response  - Implement non-pharmacological measures as appropriate and evaluate response  - Consider cultural and social influences on pain and pain management  - Notify physician/advanced practitioner if interventions unsuccessful or patient reports new pain  Outcome: Progressing     Problem: INFECTION - ADULT  Goal: Absence or prevention of progression during hospitalization  Description: INTERVENTIONS:  - Assess and monitor for signs and symptoms of infection  - Monitor lab/diagnostic results  - Monitor all insertion sites, i e  indwelling lines, tubes, and drains  - Monitor endotracheal if appropriate and nasal secretions for changes in amount and color  - [place] appropriate cooling/warming therapies per order  - Administer medications as ordered  - Instruct and encourage patient and family to use good hand hygiene technique  - Identify and instruct in appropriate isolation precautions for identified infection/condition  Outcome: Progressing  Goal: Absence of fever/infection during neutropenic period  Description: INTERVENTIONS:  - Monitor WBC    Outcome: Progressing     Problem: SAFETY ADULT  Goal: Patient will remain free of falls  Description: INTERVENTIONS:  - Educate patient/family on patient safety including physical limitations  - Instruct patient to call for assistance with activity   - Consult OT/PT to assist with strengthening/mobility   - Keep Call bell within reach  - Keep bed low and locked with side rails adjusted as appropriate  - Keep care items and personal belongings within reach  - Initiate and maintain comfort rounds  - Make Fall Risk Sign visible to staff  - Offer Toileting every 2 Hours, in advance of need  - Initiate/Maintain alarm  - Obtain necessary fall risk management equipment:   - Apply yellow socks and bracelet for high fall risk patients  - Consider moving patient to room near nurses station  Outcome: Progressing  Goal: Maintain or return to baseline ADL function  Description: INTERVENTIONS:  -  Assess patient's ability to carry out ADLs; assess patient's baseline for ADL function and identify physical deficits which impact ability to perform ADLs (bathing, care of mouth/teeth, toileting, grooming, dressing, etc )  - Assess/evaluate cause of self-care deficits   - Assess range of motion  - Assess patient's mobility; develop plan if impaired  - Assess patient's need for assistive devices and provide as appropriate  - Encourage maximum independence but intervene and supervise when necessary  - Involve family in performance of ADLs  - Assess for home care needs following discharge   - Consider OT consult to assist with ADL evaluation and planning for discharge  - Provide patient education as appropriate  Outcome: Progressing  Goal: Maintains/Returns to pre admission functional level  Description: INTERVENTIONS:  - Perform BMAT or MOVE assessment daily    - Set and communicate daily mobility goal to care team and patient/family/caregiver  - Collaborate with rehabilitation services on mobility goals if consulted  - Perform Range of Motion 3 times a day  - Reposition patient every 2 hours    - Dangle patient 3 times a day  - Stand patient 3 times a day  - Ambulate patient 3 times a day  - Out of bed to chair 3 times a day   - Out of bed for meals 3 times a day  - Out of bed for toileting  - Record patient progress and toleration of activity level   Outcome: Progressing     Problem: CARDIOVASCULAR - ADULT  Goal: Maintains optimal cardiac output and hemodynamic stability  Description: INTERVENTIONS:  - Monitor I/O, vital signs and rhythm  - Monitor for S/S and trends of decreased cardiac output  - Administer and titrate ordered vasoactive medications to optimize hemodynamic stability  - Assess quality of pulses, skin color and temperature  - Assess for signs of decreased coronary artery perfusion  - Instruct patient to report change in severity of symptoms  Outcome: Progressing     Problem: METABOLIC, FLUID AND ELECTROLYTES - ADULT  Goal: Electrolytes maintained within normal limits  Description: INTERVENTIONS:  - Monitor labs and assess patient for signs and symptoms of electrolyte imbalances  - Administer electrolyte replacement as ordered  - Monitor response to electrolyte replacements, including repeat lab results as appropriate  - Instruct patient on fluid and nutrition as appropriate  Outcome: Progressing     Problem: HEMATOLOGIC - ADULT  Goal: Maintains hematologic stability  Description: INTERVENTIONS  - Assess for signs and symptoms of bleeding or hemorrhage  - Monitor labs  - Administer supportive blood products/factors as ordered and appropriate  Outcome: Progressing

## 2022-09-13 NOTE — ASSESSMENT & PLAN NOTE
· Home regimen:  Lisinopril 20 mg daily, Lasix 40 mg daily  · Patient has been taking lisinopril 40 mg daily over the past couple of days to help manage BP  · Initial BP in the /102    Received hydralazine 10 mg and Lopressor 10 mg   BP improved to 168/72  · Goal blood pressure around 673 systolic due to not wanting to drop blood pressure rapidly  · Hydralazine 5 mg p r n  for systolic over 632  · Add amlodipine 5 mg daily

## 2022-09-13 NOTE — ASSESSMENT & PLAN NOTE
Lab Results   Component Value Date    HGBA1C 8 3 (H) 05/11/2022       No results for input(s): POCGLU in the last 72 hours      Blood Sugar Average: Last 72 hrs:  · Home regimen:  Metformin 1000 mg b i d , Januvia  · Hold home oral hypoglycemics   · SSI

## 2022-09-13 NOTE — ASSESSMENT & PLAN NOTE
· Stage IV B high-grade endometrial cancer, liver, retroperitoneal lymph node, lung metastatic disease  · Has received 7 cycles of carboplatin, Taxol and 3 cycles of bevacizumab  · Plan is for patient to be initiated on Keytruda and lenvatinib but has not been able to do so due to elevated BP  · Follows with Dr Martha Chaney from Gynecology Oncology

## 2022-09-13 NOTE — PHYSICAL THERAPY NOTE
Physical Therapy Screen    Patient Name: Jimenez CASTILLO Date: 9/13/2022     Problem List  Principal Problem:    Hypertensive urgency  Active Problems:    Endometrial cancer ([facility] )    Type 2 diabetes mellitus without complication ([place] [address] )    Morbid obesity with BMI of 40 0-44 9, adult ([facility] )    Anemia associated with chemotherapy    Headache       Past Medical History  Past Medical History:   Diagnosis Date    Anxiety     Arthritis     Back pain     Cancer ([place] [address] )     Depression     Diabetes mellitus ([place] [address] )     Disease of thyroid gland     Endometrial cancer ([place] [address] )     GERD (gastroesophageal reflux disease)     Hyperlipidemia associated with type 2 diabetes mellitus ([place] [address] )     Hypertension     Morbid obesity with BMI of 40 0-44 9, adult ([place] [address] )     Type 2 diabetes mellitus without complication ([place] [address] )     Urinary incontinence     Wears glasses         Past Surgical History  Past Surgical History:   Procedure Laterality Date    ABDOMINAL ADHESION SURGERY N/A 5/31/2022    Procedure: Kristi Chavez;  Surgeon: Bunny Davis MD;  [facility];  Service: Gynecology Oncology    CATARACT EXTRACTION W/ INTRAOCULAR LENS IMPLANT Bilateral     CHOLECYSTECTOMY OPEN      CYSTOSCOPY N/A 5/31/2022    Procedure: Mayra Diaz;  Surgeon: Bunny Davis MD;  [facility];  Service: Gynecology Oncology    IR PORT PLACEMENT  03/07/2022    IA LAPAROSCOPY W TOT HYSTERECTUTERUS <=250 GRAM  W TUBE/OVARY N/A 5/31/2022    Procedure: ROBOTIC ASSISTED TOTAL LAPAROSCOPIC HYSTERECTOMY, RIGHT SALPINGO-OOPHORECTOMY, LEFT SALPINGECTOMY,;  Surgeon: Bunny Davis MD;  [facility];  Service: Gynecology Oncology    SALPINGOOPHORECTOMY N/A     only had one removed and not sure which    TONSILLECTOMY          09/13/22 1216   PT Last Visit   PT Visit Date 09/13/22   Note Type   Note type Screen   Additional Comments PT Orders received  Chart reviewed  Nursing reports pt is independent in room with no acute PT needs  Will DC PT orders  Please reconsult if mobility status changes  Encourage ambulation TID and OOB for all meals         Sophie Weber, PT

## 2022-09-13 NOTE — ASSESSMENT & PLAN NOTE
· Hemoglobin 9 1 on admission   · Baseline appears to be about 9-10   · Continue to monitor and transfuse if less than 7

## 2022-09-13 NOTE — ASSESSMENT & PLAN NOTE
· Home regimen:  Lisinopril 20 mg daily, Lasix 40 mg daily  · Patient has been taking lisinopril 40 mg daily over the past couple of days to help manage BP  · Initial BP in the /102  Received hydralazine 10 mg and Lopressor 10 mg   BP improved to 168/72  · Goal blood pressure around 986 systolic due to not wanting to drop blood pressure rapidly  · Hydralazine 5 mg p r n  for systolic over 787  · Stable on lasix, lisinopril 40, norvasc 5

## 2022-09-13 NOTE — ASSESSMENT & PLAN NOTE
· Stage IV B high-grade endometrial cancer, liver, retroperitoneal lymph node, lung metastatic disease  · Has received 7 cycles of carboplatin, Taxol and 3 cycles of bevacizumab  · Plan is for patient to be initiated on Keytruda and lenvatinib but has not been able to do so due to elevated BP  · Follows with Dr Celestine Crisostomo from Gynecology Oncology

## 2022-09-13 NOTE — H&P
[place]  H&P- [place] 1955, 75 y o  female MRN: [id number]  Unit/Bed#: -01 Encounter: [id number]  Primary Care Provider: Martine Tobar DO   Date and time admitted to hospital: 9/12/2022  6:30 PM    * Hypertensive urgency  Assessment & Plan  · Home regimen:  Lisinopril 20 mg daily, Lasix 40 mg daily  · Patient has been taking lisinopril 40 mg daily over the past couple of days to help manage BP  · Initial BP in the /102  Received hydralazine 10 mg and Lopressor 10 mg   BP improved to 168/72  · Goal blood pressure around 595 systolic due to not wanting to drop blood pressure rapidly  · Hydralazine 5 mg p r n  for systolic over 315  · Add amlodipine 5 mg daily    Headache  Assessment & Plan  · Presented to the ER due to intermittent generalized headache for the past 3 weeks that has been constant over the past 2-3 days  · Seen in the ER on 09/11 and received headache cocktail/hypertensive medications  Headache improved so patient was not admitted to the hospital but then headache returned later that night  · Patient with hypertensive urgency most likely causing headaches  Improve BP  · Headache cocktail  · Order 1 g IV magnesium    Recheck magnesium in the morning  · Consider Neurology consult if headache does not improve by morning    Endometrial cancer [facility])  Assessment & Plan  · Stage IV B high-grade endometrial cancer, liver, retroperitoneal lymph node, lung metastatic disease  · Has received 7 cycles of carboplatin, Taxol and 3 cycles of bevacizumab  · Plan is for patient to be initiated on Keytruda and lenvatinib but has not been able to do so due to elevated BP  · Follows with Dr Coralie Spatz from Gynecology Oncology     Anemia associated with chemotherapy  Assessment & Plan  · Hemoglobin 9 1 on admission   · Baseline appears to be about 9-10   · Continue to monitor and transfuse if less than 7    Morbid obesity with BMI of 40 0-44 9, adult [facility])  Assessment & Plan  Body mass index is 40 74 kg/m²  · Encouraged lifestyle changes    Type 2 diabetes mellitus without complication [facility])  Assessment & Plan  Lab Results   Component Value Date    HGBA1C 8 3 (H) 05/11/2022       No results for input(s): POCGLU in the last 72 hours  Blood Sugar Average: Last 72 hrs:  · Home regimen:  Metformin 1000 mg b i d , Januvia, glargine 38 units a m , 36 units HS  · Hold home oral hypoglycemics   · SSI    VTE Pharmacologic Prophylaxis: VTE Score: 3 Moderate Risk (Score 3-4) - Pharmacological DVT Prophylaxis Ordered: heparin  Code Status: Level 1 - Full Code   Discussion with family: Updated  () at bedside  Anticipated Length of Stay: Patient will be admitted on an observation basis with an anticipated length of stay of less than 2 midnights secondary to HTN urgency, HA  Total Time for Visit, including Counseling / Coordination of Care: 60 minutes Greater than 50% of this total time spent on direct patient counseling and coordination of care  Chief Complaint: Headache     History of Present Illness:  Manohar Gonzalez is a 75 y o  female with a PMH of endometrial cancer, anemia, morbid obesity, type 2 diabetes who presents from the ER due to intermittent generalized headache over the past 3 weeks has now been constant over the past 2-3 days  Patient was seen in the ER on 09/11 and received migraine cocktail along with medications to lower her blood pressure  Patient saw improvement in her headache thus was discharged from the ER  However, patient reports that later that night headache had returned  Blood pressure readings at home are in the 180s but patient is unsure if accurate due to it being a radial pressure  Headaches are suspected to be due to rising blood pressure  Patient was instructed a couple days ago to double her dose of lisinopril from 20 mg daily to 40 mg daily    Patient reports compliance with this but has not seen improvement  Scheduled to start lenvatinib for endometrial cancer but has been unable to do so due to the elevated blood pressure  Patient was instructed by Gynecology Oncology to come to the ER for admission to the hospital to get better control of her blood pressure so she can be started on this medication  Denies chest pain, shortness of breath, abdominal pain, nausea, vomiting, diarrhea, fevers or chills  Review of Systems:  Review of Systems   Constitutional: Negative for fatigue and fever  HENT: Negative for sore throat  Respiratory: Negative for cough, chest tightness and shortness of breath  Cardiovascular: Negative for chest pain  Gastrointestinal: Negative for abdominal distention, abdominal pain, diarrhea, nausea and vomiting  Genitourinary: Negative for difficulty urinating  Musculoskeletal: Negative for arthralgias  Neurological: Positive for headaches  Negative for weakness  Psychiatric/Behavioral: Negative for agitation and behavioral problems  All other systems reviewed and are negative        Past Medical and Surgical History:   Past Medical History:   Diagnosis Date    Anxiety     Arthritis     Back pain     Cancer ([facility] )     Depression     Diabetes mellitus ([facility] )     Disease of thyroid gland     Endometrial cancer (H[facility])     GERD (gastroesophageal reflux disease)     Hyperlipidemia associated with type 2 diabetes mellitus ([facility] )     Hypertension     Morbid obesity with BMI of 40 0-44 9, adult ([facility] )     Type 2 diabetes mellitus without complication ([place] [address] )     Urinary incontinence     Wears glasses        Past Surgical History:   Procedure Laterality Date    ABDOMINAL ADHESION SURGERY N/A 5/31/2022    Procedure: Dixon Isaiah;  Surgeon: Simone Mayo MD;  [facility];  Service: Gynecology Oncology    CATARACT EXTRACTION W/ INTRAOCULAR LENS IMPLANT Bilateral     CHOLECYSTECTOMY OPEN      CYSTOSCOPY N/A 5/31/2022 Procedure: CYSTOSCOPY;  Surgeon: Sana Kumar MD;  [facility];  Service: Gynecology Oncology    IR PORT PLACEMENT  03/07/2022    ME LAPAROSCOPY W TOT HYSTERECTUTERUS <=250 GRAM  W TUBE/OVARY N/A 5/31/2022    Procedure: ROBOTIC ASSISTED TOTAL LAPAROSCOPIC HYSTERECTOMY, RIGHT SALPINGO-OOPHORECTOMY, LEFT SALPINGECTOMY,;  Surgeon: Sana Kumar MD;  [facility];  Service: Gynecology Oncology    SALPINGOOPHORECTOMY N/A     only had one removed and not sure which    TONSILLECTOMY         Meds/Allergies:  Prior to Admission medications    Medication Sig Start Date End Date Taking? Authorizing Provider   acetaminophen (TYLENOL) 650 mg CR tablet Take 1,300 mg by mouth in the morning and 1,300 mg before bedtime      Historical Provider, MD   Bacillus Coagulans-Inulin (Probiotic) 1-250 BILLION-MG CAPS as directed    Historical Provider, MD   BD Pen Needle Yoana 2nd Gen 32G X 4 MM MISC 2 (two) times a day 2/22/22   Historical Provider, MD   Contour Next Test test strip 2 (two) times a day Test 4/18/22   Historical Provider, MD   fexofenadine (ALLEGRA) 180 MG tablet every 24 hours    Historical Provider, MD   furosemide (LASIX) 40 mg tablet Take 1 tablet by mouth daily    Historical Provider, MD   gabapentin (NEURONTIN) 300 mg capsule Take 300 mg by mouth 3 (three) times a day 4/1/22   Historical Provider, MD   ibuprofen (MOTRIN) 200 mg tablet Take 400 mg by mouth every 6 (six) hours as needed for mild pain    Historical Provider, MD   Lenvatinib, 10 MG Daily Dose, (Lenvima, 10 MG Daily Dose,) 10 MG CPPK Take 10 mg by mouth daily 8/30/22 9/29/22  Chyrl Ing BETTE Rainey   lidocaine-prilocaine (EMLA) cream Apply to port site 30 min prior to labs/chemo 3/10/22   Chyrl Ing SynBETTE escalona   lisinopril (ZESTRIL) 20 mg tablet Take 1 tablet by mouth daily    Historical Provider, MD   LORazepam (ATIVAN) 0 5 mg tablet Take 0 5 mg by mouth in the morning and 0 5 mg in the evening  3/25/22 Historical Provider, MD   magnesium Oxide (MAG-OX) 400 mg TABS Take 400 mg by mouth daily 7/22/22   Historical Provider, MD   magnesium oxide (MAG-OX) 400 mg Take 400 mg by mouth in the morning  Historical Provider, MD   metFORMIN (GLUCOPHAGE) 1000 MG tablet Take 1 tablet by mouth 2 (two) times a day with meals    Historical Provider, MD   Microlet Lancets MISC 2 (two) times a day Test 4/18/22   Historical Provider, MD   omeprazole (PriLOSEC) 20 mg delayed release capsule every 24 hours    Historical Provider, MD   ondansetron (ZOFRAN) 8 mg tablet Take 1 tablet (8 mg total) by mouth every 8 (eight) hours as needed for nausea or vomiting 7/26/22   Afshin Rainey PA-C   oxyCODONE (Roxicodone) 5 immediate release tablet Take 1 tablet (5 mg total) by mouth every 6 (six) hours as needed for moderate pain Max Daily Amount: 20 mg 3/1/22   Afshin Rainey PA-C   Rosuvastatin Calcium 20 MG CPSP Take 1 tablet by mouth daily    Historical Provider, MD   sertraline (ZOLOFT) 50 mg tablet Take 50 mg by mouth daily    Historical Provider, MD   sitaGLIPtin (JANUVIA) 100 mg tablet Take 1 tablet by mouth daily    Historical Provider, MD Babar Major 300 units/mL CONCENTRATED U-300 injection pen (1-unit dial) every 12 (twelve) hours 34 units in the am and 36 units at night 4/4/22   Historical Provider, MD   apixaban (Eliquis) 2 5 mg Take 1 tablet (2 5 mg total) by mouth 2 (two) times a day for 28 days 5/31/22 5/31/22  Alanna Keen MD     I have reviewed home medications with patient personally  Allergies:    Allergies   Allergen Reactions    Empagliflozin Other (See Comments)     Yeast infection    Exenatide Nausea Only    Glipizide Other (See Comments)     hypoglycemic    Repaglinide Nausea Only       Social History:  Marital Status: /Civil Union   Occupation:  Retired  Patient Pre-hospital Living Situation: Home  Patient Pre-hospital Level of Mobility: walks  Patient Pre-hospital Diet Restrictions:  Diabetic  Substance Use History:   Social History     Substance and Sexual Activity   Alcohol Use Not Currently     Social History     Tobacco Use   Smoking Status Never Smoker   Smokeless Tobacco Never Used     Social History     Substance and Sexual Activity   Drug Use Never       Family History:  History reviewed  No pertinent family history  Physical Exam:     Vitals:   Blood Pressure: 166/56 (09/12/22 2147)  Pulse: 80 (09/12/22 2147)  Temperature: 98 9 °F (37 2 °C) (09/12/22 1604)  Temp Source: Temporal (09/12/22 1604)  Respirations: 20 (09/12/22 2147)  Height: 5' 3" (160 cm) (09/12/22 1604)  Weight - Scale: 104 kg (230 lb) (09/12/22 1604)  SpO2: 94 % (09/12/22 2147)    Physical Exam  Vitals and nursing note reviewed  Constitutional:       Appearance: Normal appearance  She is obese  HENT:      Head: Normocephalic  Eyes:      Extraocular Movements: Extraocular movements intact  Pupils: Pupils are equal, round, and reactive to light  Cardiovascular:      Rate and Rhythm: Normal rate and regular rhythm  Heart sounds: No murmur heard  Pulmonary:      Effort: Pulmonary effort is normal  No respiratory distress  Breath sounds: Normal breath sounds  No wheezing  Abdominal:      General: Bowel sounds are normal  There is no distension  Tenderness: There is no abdominal tenderness  There is no guarding  Musculoskeletal:         General: Normal range of motion  Cervical back: Normal range of motion  Skin:     General: Skin is warm  Neurological:      General: No focal deficit present  Mental Status: She is alert and oriented to person, place, and time  Psychiatric:         Mood and Affect: Mood normal          Behavior: Behavior normal          Thought Content:  Thought content normal           Additional Data:     Lab Results:  Results from last 7 days   Lab Units 09/12/22 1957   WBC Thousand/uL 8 73   HEMOGLOBIN g/dL 9 1*   HEMATOCRIT % 28 5* PLATELETS Thousands/uL 125*   NEUTROS PCT % 76*   LYMPHS PCT % 12*   MONOS PCT % 10   EOS PCT % 1     Results from last 7 days   Lab Units 09/12/22 1957 09/12/22  1216   SODIUM mmol/L 139 141   POTASSIUM mmol/L 3 7 3 6   CHLORIDE mmol/L 101 102   CO2 mmol/L 26 29   BUN mg/dL 11 11   CREATININE mg/dL 0 83 1 01   ANION GAP mmol/L 12 10   CALCIUM mg/dL 9 0 8 9   ALBUMIN g/dL  --  3 1*   TOTAL BILIRUBIN mg/dL  --  0 40   ALK PHOS U/L  --  106   ALT U/L  --  25   AST U/L  --  22   GLUCOSE RANDOM mg/dL 124  --      Results from last 7 days   Lab Units 09/12/22  0014   INR  0 91                   Imaging: No pertinent imaging reviewed  No orders to display       EKG and Other Studies Reviewed on Admission:   · EKG: NSR  HR 92      ** Please Note: This note has been constructed using a voice recognition system   **

## 2022-09-13 NOTE — ASSESSMENT & PLAN NOTE
· Presented to the ER due to intermittent generalized headache for the past 3 weeks that has been constant over the past 2-3 days  · Seen in the ER on 09/11 and received headache cocktail/hypertensive medications  Headache improved so patient was not admitted to the hospital but then headache returned later that night  · Patient with hypertensive urgency most likely causing headaches  Improve BP  · Headache cocktail  · Order 1 g IV magnesium    Recheck magnesium in the morning  · Consider Neurology consult if headache does not improve by morning

## 2022-09-14 ENCOUNTER — TELEPHONE (OUTPATIENT)
Dept: GYNECOLOGIC ONCOLOGY | Facility: CLINIC | Age: 67
End: 2022-09-14

## 2022-09-14 LAB
ATRIAL RATE: 103 BPM
ATRIAL RATE: 92 BPM
P AXIS: 39 DEGREES
P AXIS: 40 DEGREES
PR INTERVAL: 182 MS
PR INTERVAL: 186 MS
QRS AXIS: 61 DEGREES
QRS AXIS: 63 DEGREES
QRSD INTERVAL: 114 MS
QRSD INTERVAL: 118 MS
QT INTERVAL: 366 MS
QT INTERVAL: 390 MS
QTC INTERVAL: 479 MS
QTC INTERVAL: 482 MS
T WAVE AXIS: 1 DEGREES
T WAVE AXIS: 7 DEGREES
VENTRICULAR RATE: 103 BPM
VENTRICULAR RATE: 92 BPM

## 2022-09-14 PROCEDURE — 93010 ELECTROCARDIOGRAM REPORT: CPT | Performed by: INTERNAL MEDICINE

## 2022-09-14 RX ORDER — SODIUM CHLORIDE 9 MG/ML
20 INJECTION, SOLUTION INTRAVENOUS ONCE
Status: CANCELLED | OUTPATIENT
Start: 2022-09-15

## 2022-09-14 NOTE — TELEPHONE ENCOUNTER
Patient would like to know if she should still go to infusion tomorrow after most recent hospital stay

## 2022-09-14 NOTE — TELEPHONE ENCOUNTER
Return call placed to patient  Chart reviewed  HA improved, and blood pressure better controlled  PCP f/u scheduled Friday 9/16  Will plan to start keytruda as planned, but hold lenvima  Plan to f/u with patient next week and review blood pressure log   Patient to monitor BPs at least every AM and PM

## 2022-09-15 ENCOUNTER — HOSPITAL ENCOUNTER (OUTPATIENT)
Dept: INFUSION CENTER | Facility: HOSPITAL | Age: 67
Discharge: HOME | End: 2022-09-15
Attending: OBSTETRICS & GYNECOLOGY
Payer: COMMERCIAL

## 2022-09-15 VITALS
BODY MASS INDEX: 39.73 KG/M2 | TEMPERATURE: 97.2 F | SYSTOLIC BLOOD PRESSURE: 146 MMHG | RESPIRATION RATE: 18 BRPM | HEIGHT: 63 IN | WEIGHT: 224.21 LBS | OXYGEN SATURATION: 92 % | HEART RATE: 103 BPM | DIASTOLIC BLOOD PRESSURE: 78 MMHG

## 2022-09-15 DIAGNOSIS — C54.1 ENDOMETRIAL CANCER (HCC): Primary | ICD-10-CM

## 2022-09-15 PROCEDURE — 96413 CHEMO IV INFUSION 1 HR: CPT

## 2022-09-15 RX ORDER — SODIUM CHLORIDE 9 MG/ML
20 INJECTION, SOLUTION INTRAVENOUS ONCE
Status: COMPLETED | OUTPATIENT
Start: 2022-09-15 | End: 2022-09-15

## 2022-09-15 RX ADMIN — SODIUM CHLORIDE 200 MG: 9 INJECTION, SOLUTION INTRAVENOUS at 10:20

## 2022-09-15 RX ADMIN — SODIUM CHLORIDE 20 ML/HR: 9 INJECTION, SOLUTION INTRAVENOUS at 10:20

## 2022-09-15 NOTE — PROGRESS NOTES
Messaged Felicia WANG via [facility] regarding no T3 level drawn and pt on amoxicillin  Received ok to continue with todays tx

## 2022-09-15 NOTE — PROGRESS NOTES
Pt here today for chemo tolerated well no adverse reactions AVS provided and pt left ambulatory with steady gait

## 2022-09-19 ENCOUNTER — APPOINTMENT (OUTPATIENT)
Dept: LAB | Facility: HOSPITAL | Age: 67
End: 2022-09-19
Payer: COMMERCIAL

## 2022-09-26 ENCOUNTER — APPOINTMENT (OUTPATIENT)
Dept: LAB | Facility: HOSPITAL | Age: 67
End: 2022-09-26
Payer: COMMERCIAL

## 2022-09-29 ENCOUNTER — OFFICE VISIT (OUTPATIENT)
Dept: GYNECOLOGIC ONCOLOGY | Facility: HOSPITAL | Age: 67
End: 2022-09-29
Payer: COMMERCIAL

## 2022-09-29 VITALS
TEMPERATURE: 97.6 F | HEART RATE: 94 BPM | BODY MASS INDEX: 38.98 KG/M2 | SYSTOLIC BLOOD PRESSURE: 144 MMHG | OXYGEN SATURATION: 96 % | WEIGHT: 220 LBS | DIASTOLIC BLOOD PRESSURE: 62 MMHG | HEIGHT: 63 IN

## 2022-09-29 DIAGNOSIS — C54.1 ENDOMETRIAL CANCER (HCC): Primary | ICD-10-CM

## 2022-09-29 PROCEDURE — 99215 OFFICE O/P EST HI 40 MIN: CPT | Performed by: OBSTETRICS & GYNECOLOGY

## 2022-09-29 RX ORDER — LENVATINIB 4 MG/1
4 CAPSULE ORAL DAILY
Qty: 30 EACH | Refills: 3 | Status: SHIPPED | OUTPATIENT
Start: 2022-09-29

## 2022-09-29 NOTE — ASSESSMENT & PLAN NOTE
76-year-old with stage IV B high-grade endometrial cancer, liver, retroperitoneal lymph node, lung metastatic disease currently receiving palliative keytruda therapy  She has not started lenvatinib due to hypertensive urgency with hospital admission 9/12/2022  She is currently on multiple antihypertensives with blood pressure ranges from the 130s to 160s over 70s to 90s  I reviewed her CBC, CMP from 9/26/2022, CTA chest 9/12/2022, hospital admission notes  Her performance status is 0   1  We discussed possible etiologies for her hypertensive urgency including prior Avastin use  2  I discussed the risks and benefits of starting lenvatinib along with her Keytruda  She understands that hypertension is a side effect of lenvatinib and therefore the lenvatinib dose will be further reduced to 4 mg orally daily  Given the palliative nature of treatment, will continue to monitor blood pressure closely and have a low threshold for stopping lenvatinib  3  She will continue to receive her Keytruda infusions until she received the adjusted dose of lenvatinib  4 She will discuss further adjustments in her blood pressure regimen with her family physician

## 2022-09-29 NOTE — PROGRESS NOTES
Assessment/Plan:    Problem List Items Addressed This Visit        Genitourinary    Endometrial cancer ([facility] [address] ) - Primary     69-year-old with stage IV B high-grade endometrial cancer, liver, retroperitoneal lymph node, lung metastatic disease currently receiving palliative keytruda therapy  She has not started lenvatinib due to hypertensive urgency with hospital admission 9/12/2022  She is currently on multiple antihypertensives with blood pressure ranges from the 130s to 160s over 70s to 90s  I reviewed her CBC, CMP from 9/26/2022, CTA chest 9/12/2022, hospital admission notes  Her performance status is 0   1  We discussed possible etiologies for her hypertensive urgency including prior Avastin use  2  I discussed the risks and benefits of starting lenvatinib along with her Keytruda  She understands that hypertension is a side effect of lenvatinib and therefore the lenvatinib dose will be further reduced to 4 mg orally daily  Given the palliative nature of treatment, will continue to monitor blood pressure closely and have a low threshold for stopping lenvatinib  3  She will continue to receive her Keytruda infusions until she received the adjusted dose of lenvatinib  4 She will discuss further adjustments in her blood pressure regimen with her family physician                   CHIEF COMPLAINT:  Treatment discussion      Problem:  Cancer Staging  Endometrial cancer [facility])  Staging form: Corpus Uteri - Carcinoma, AJCC 8th Edition  - Clinical: FIGO Stage IVB (cM1) - Signed by Saniya Harris MD on 2/17/2022        Previous therapy:  Oncology History   Endometrial cancer ([facility] [address] )   1/26/2022 Initial Diagnosis    [facility] )     2/17/2022 -  Cancer Staged    Staging form: Corpus Uteri - Carcinoma, AJCC 8th Edition  - Clinical: FIGO Stage IVB (cM1) - Signed by Saniya Harris MD on 2/17/2022  Histologic grade (G): G3  Histologic grading system: 3 grade system       2/24/2022 - Chemotherapy    Taxol 175 mg/m2 and carboplatin AUC 6 every 21 days  Taxol dose-reduced to 135 mg/m2 with cycle 2 due to arthralgias/myalgias  She has received 4 cycles in the neoadjuvant setting, and 2 following aborting debulking  Carboplatin dose-reduced to AUC 5 with cycle 5 d/t thrombocytopenia  3/11/2022 Genomic Testing    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, ER/IA positive, microsatellite stable  No other targeted therapy opportunities  5/31/2022 Surgery    Robotic-assisted BSO and lysis of adhesions  Hysterectomy aborted due to parametrial/cervical involvement and adhesive disease  9/15/2022 -  Chemotherapy    pembrolizumab (KEYTRUDA) IVPB, 200 mg, Intravenous, Once, 1 of 4 cycles  Administration: 200 mg (9/15/2022)           Patient ID: Cherly Schilder is a 75 y o  female  Who returns for treatment discussion  She was recently admitted for observation from September 12th to 9/13/2022 due to hypertensive urgency  Her blood pressure was in the 200/100 range and she was slurring her speech  CT of the head was negative  She also had a PE protocol the chest which did not reveal any evidence of pulmonary embolism  There were unchanged pulmonary nodules and persistent hilar lymphadenopathy  Her blood pressure medicine was increased  Notably, her amlodipine dose was increased from 5-10 mg  This helped control her blood pressure better and now her blood pressures at home are from the 130s to 150s over 70s to 90s  Labs from 9/26/2022 revealed a glucose of 249 on her CMP, albumin 3 grams/deciliter, serum creatinine 0 87 mg/dL  She was hypomagnesemic at 1 5  Hemoglobin 9 4 grams/deciliter  She notes nausea in the afternoon without evidence of vomiting  She is passing flatus and having bowel movements  She continues to have light bleeding from the vagina  And occasional pelvic pain which has been manageable  She is able to perform her activities of daily living    No other interval change in her medications or medical history since her last visit  The following portions of the patient's history were reviewed and updated as appropriate: allergies, current medications, past family history, past medical history, past social history, past surgical history and problem list     Review of Systems   Constitutional: Negative for activity change and unexpected weight change  HENT: Negative  Eyes: Negative  Respiratory: Negative  Cardiovascular: Negative  Gastrointestinal: Positive for abdominal pain and nausea  Negative for abdominal distention  Endocrine: Negative  Genitourinary: Positive for pelvic pain and vaginal bleeding  Musculoskeletal: Negative  Skin: Negative  Allergic/Immunologic: Negative  Neurological: Positive for headaches  Hematological: Negative  Psychiatric/Behavioral: Negative  Current Outpatient Medications   Medication Sig Dispense Refill    acetaminophen (TYLENOL) 650 mg CR tablet Take 1,300 mg by mouth in the morning and 1,300 mg before bedtime   amLODIPine (NORVASC) 5 mg tablet Take 1 tablet (5 mg total) by mouth daily 30 tablet 0    BD Pen Needle Yoana 2nd Gen 32G X 4 MM MISC 2 (two) times a day      Contour Next Test test strip 2 (two) times a day Test      fexofenadine (ALLEGRA) 180 MG tablet every 24 hours      furosemide (LASIX) 40 mg tablet Take 1 tablet by mouth daily      gabapentin (NEURONTIN) 300 mg capsule Take 300 mg by mouth 3 (three) times a day      Lenvatinib, 4 MG Daily Dose, (Lenvima, 4 MG Daily Dose,) 4 MG CPPK Take 4 mg by mouth in the morning 30 each 3    lidocaine-prilocaine (EMLA) cream Apply to port site 30 min prior to labs/chemo 30 g 2    lisinopril (ZESTRIL) 40 mg tablet Take 1 tablet (40 mg total) by mouth daily 30 tablet 0    LORazepam (ATIVAN) 0 5 mg tablet Take 0 5 mg by mouth in the morning and 0 5 mg in the evening   magnesium oxide (MAG-OX) 400 mg Take 400 mg by mouth in the morning        metFORMIN (GLUCOPHAGE) 1000 MG tablet Take 1 tablet by mouth 2 (two) times a day with meals      Microlet Lancets MISC 2 (two) times a day Test      omeprazole (PriLOSEC) 20 mg delayed release capsule every 24 hours      ondansetron (ZOFRAN) 8 mg tablet Take 1 tablet (8 mg total) by mouth every 8 (eight) hours as needed for nausea or vomiting 20 tablet 1    Rosuvastatin Calcium 20 MG CPSP Take 1 tablet by mouth daily      sertraline (ZOLOFT) 50 mg tablet Take 50 mg by mouth daily      sitaGLIPtin (JANUVIA) 100 mg tablet Take 1 tablet by mouth daily      Toujeo SoloStar 300 units/mL CONCENTRATED U-300 injection pen (1-unit dial) every 12 (twelve) hours 34 units in the am and 36 units at night       No current facility-administered medications for this visit  Objective:    Blood pressure 144/62, pulse 94, temperature 97 6 °F (36 4 °C), temperature source Tympanic, height 5' 3 31" (1 608 m), weight 99 8 kg (220 lb), SpO2 96 %  Body mass index is 38 59 kg/m²  Body surface area is 2 02 meters squared  Physical Exam  Vitals reviewed  Constitutional:       General: She is not in acute distress  Appearance: Normal appearance  She is obese  She is not ill-appearing, toxic-appearing or diaphoretic  HENT:      Head: Normocephalic and atraumatic  Mouth/Throat:      Mouth: Mucous membranes are moist    Eyes:      General: No scleral icterus  Right eye: No discharge  Left eye: No discharge  Conjunctiva/sclera: Conjunctivae normal    Pulmonary:      Effort: Pulmonary effort is normal    Musculoskeletal:      Right lower leg: No edema  Left lower leg: No edema  Skin:     General: Skin is warm and dry  Coloration: Skin is not jaundiced  Findings: No rash  Neurological:      General: No focal deficit present  Mental Status: She is alert and oriented to person, place, and time  Cranial Nerves: No cranial nerve deficit  Motor: No weakness  Gait: Gait normal    Psychiatric:         Mood and Affect: Mood normal          Behavior: Behavior normal          Thought Content:  Thought content normal          Judgment: Judgment normal          Lab Results   Component Value Date     7 3 09/12/2022     Lab Results   Component Value Date    K 4 1 09/26/2022     09/26/2022    CO2 27 09/26/2022    BUN 15 09/26/2022    CREATININE 0 87 09/26/2022    GLUF 155 (H) 09/12/2022    CALCIUM 9 2 09/26/2022    CORRECTEDCA 10 0 09/26/2022    AST 25 09/26/2022    ALT 29 09/26/2022    ALKPHOS 110 09/26/2022    EGFR 68 09/26/2022     Lab Results   Component Value Date    WBC 6 45 09/26/2022    HGB 9 4 (L) 09/26/2022    HCT 32 1 (L) 09/26/2022    MCV 97 09/26/2022     09/26/2022     Lab Results   Component Value Date    NEUTROABS 3 90 09/26/2022        Trend:  Lab Results   Component Value Date     7 3 09/12/2022     12 6 08/08/2022     14 3 07/11/2022     12 2 05/10/2022     13 6 04/25/2022     10 9 04/04/2022     11 4 03/15/2022     10 9 02/28/2022

## 2022-10-03 ENCOUNTER — APPOINTMENT (OUTPATIENT)
Dept: LAB | Facility: HOSPITAL | Age: 67
End: 2022-10-03
Payer: COMMERCIAL

## 2022-10-03 DIAGNOSIS — C54.1 ENDOMETRIAL CANCER (HCC): ICD-10-CM

## 2022-10-03 LAB
AMYLASE SERPL-CCNC: 38 IU/L (ref 25–115)
CREAT UR-MCNC: 104 MG/DL
LIPASE SERPL-CCNC: 77 U/L (ref 73–393)
PROT UR-MCNC: 21 MG/DL
PROT/CREAT UR: 0.2 MG/G{CREAT} (ref 0–0.1)
TSH SERPL DL<=0.05 MIU/L-ACNC: 1.36 UIU/ML (ref 0.45–4.5)

## 2022-10-03 PROCEDURE — 83690 ASSAY OF LIPASE: CPT

## 2022-10-03 PROCEDURE — 82150 ASSAY OF AMYLASE: CPT

## 2022-10-03 PROCEDURE — 84156 ASSAY OF PROTEIN URINE: CPT

## 2022-10-03 PROCEDURE — 84443 ASSAY THYROID STIM HORMONE: CPT

## 2022-10-03 PROCEDURE — 82570 ASSAY OF URINE CREATININE: CPT

## 2022-10-05 ENCOUNTER — TELEPHONE (OUTPATIENT)
Dept: SURGICAL ONCOLOGY | Facility: CLINIC | Age: 67
End: 2022-10-05

## 2022-10-05 RX ORDER — SODIUM CHLORIDE 9 MG/ML
20 INJECTION, SOLUTION INTRAVENOUS ONCE
Status: CANCELLED | OUTPATIENT
Start: 2022-10-06

## 2022-10-05 NOTE — TELEPHONE ENCOUNTER
Patient calling in about starting her Lenvima 4mg  She would like to know when she is supposed to start this and if she can take it before her infusion tomorrow

## 2022-10-05 NOTE — TELEPHONE ENCOUNTER
I spoke with Rosa Ismaels who has been monitoring her BP BID since 9/18/22 and reports that her BP has maintained a range of 132-162/66-79 consistently  Patient was informed it is okay to start Lenvima 4 mg and to continue to monitor BP  She is to call us if BP is > 140/90  She voiced understanding

## 2022-10-06 ENCOUNTER — PATIENT OUTREACH (OUTPATIENT)
Dept: HEMATOLOGY ONCOLOGY | Facility: CLINIC | Age: 67
End: 2022-10-06

## 2022-10-06 ENCOUNTER — HOSPITAL ENCOUNTER (OUTPATIENT)
Dept: INFUSION CENTER | Facility: HOSPITAL | Age: 67
Discharge: HOME | End: 2022-10-06
Attending: OBSTETRICS & GYNECOLOGY
Payer: COMMERCIAL

## 2022-10-06 VITALS
DIASTOLIC BLOOD PRESSURE: 66 MMHG | TEMPERATURE: 97.1 F | WEIGHT: 213.2 LBS | SYSTOLIC BLOOD PRESSURE: 148 MMHG | BODY MASS INDEX: 37.78 KG/M2 | HEART RATE: 98 BPM | HEIGHT: 63 IN | RESPIRATION RATE: 16 BRPM | OXYGEN SATURATION: 96 %

## 2022-10-06 DIAGNOSIS — C54.1 ENDOMETRIAL CANCER (HCC): Primary | ICD-10-CM

## 2022-10-06 PROCEDURE — 96413 CHEMO IV INFUSION 1 HR: CPT

## 2022-10-06 RX ORDER — SODIUM CHLORIDE 9 MG/ML
20 INJECTION, SOLUTION INTRAVENOUS ONCE
Status: COMPLETED | OUTPATIENT
Start: 2022-10-06 | End: 2022-10-06

## 2022-10-06 RX ADMIN — SODIUM CHLORIDE 20 ML/HR: 9 INJECTION, SOLUTION INTRAVENOUS at 10:09

## 2022-10-06 RX ADMIN — SODIUM CHLORIDE 200 MG: 9 INJECTION, SOLUTION INTRAVENOUS at 10:09

## 2022-10-06 NOTE — PROGRESS NOTES
Pt here for Keytruda; tolerated treatment with no adv reactions; aware of next appt; left unit amb with steady gait

## 2022-10-06 NOTE — PROGRESS NOTES
Pt had requested to speak with MSW while she was in the infusion center this day  Pt has been coming in for treatment and had never been referred for OSW services  She had lost her hair and was requesting information on wigs  MSW provided the pt with information for [employer] in [place] as well as the [facility]  MSW reviewed all of this information with the pt  The cancer funds were discussed as the pt was alarmed when she heard the cost of a wig  She had lost her hair after her 2nd treatment which was last February  MSW spoke with her abut her hair loss and the feelings this can bring about  The pt shared that she did not have a desire for a wig as she was "content" to wear a hat  More recently, her son has decided to get  and she would like to have a wig for the wedding  MSW acknowledged this reason and encouraged her to call and make an appointment  Pt shared how It was exciting to have this wedding as it had been a difficult year for her and their family  In asking her to elaborate, the pt shared that she is an only child and she was caring for her mother who was a resident at [facility]  She was also caring for her Aunt, who had no children and lived in [place]  After her uncle , she was able to convince her Aunt to move to the same community where her mother lived and she was then caring for both of them  The pt shared that she received her diagnosis in February, her mother  in March and her Aunt a month later  The pt was appropriately tearful as she spoke of these losses  MSW spent a great deal of time listening and offering support  MSW acknowledged her grief and asked if she would be interested in any support group  At this time, the pt is not interested  MSW did provide contact information, explained the role of the OSW and encouraged her to call as needed  Emotional support was offered and MSW will continue to follow

## 2022-10-10 ENCOUNTER — APPOINTMENT (OUTPATIENT)
Dept: LAB | Facility: HOSPITAL | Age: 67
End: 2022-10-10
Payer: COMMERCIAL

## 2022-10-17 ENCOUNTER — TELEPHONE (OUTPATIENT)
Dept: GYNECOLOGIC ONCOLOGY | Facility: CLINIC | Age: 67
End: 2022-10-17

## 2022-10-17 ENCOUNTER — APPOINTMENT (OUTPATIENT)
Dept: LAB | Facility: HOSPITAL | Age: 67
End: 2022-10-17
Payer: COMMERCIAL

## 2022-10-17 NOTE — TELEPHONE ENCOUNTER
Patient called in to confirm the appt change for Oct 20th @ 10:00am  Patient will have a telephone/virtual appt   Patient phone number is [phone]

## 2022-10-20 ENCOUNTER — TELEMEDICINE (OUTPATIENT)
Dept: GYNECOLOGIC ONCOLOGY | Facility: CLINIC | Age: 67
End: 2022-10-20
Payer: COMMERCIAL

## 2022-10-20 DIAGNOSIS — C54.1 ENDOMETRIAL CANCER (HCC): Primary | ICD-10-CM

## 2022-10-20 PROCEDURE — 99213 OFFICE O/P EST LOW 20 MIN: CPT | Performed by: NURSE PRACTITIONER

## 2022-10-20 NOTE — PROGRESS NOTES
Virtual Regular Visit    Verification of patient location:    Patient is located in the following state in which I hold an active license [place]      Assessment/Plan:    Problem List Items Addressed This Visit        Genitourinary    Endometrial cancer ([facility] [address] ) - Primary     77-year-old with stage IVB high-grade endometrial cancer, currently receiving palliative therapy with Keytruda and Lenvima 4mg  She is feeling well and has no current concerns  She is on multiple anti-hypertensives, and reports good BP control with the 4mg Lenvima dose (BPs in the 120-130/60-70 range recently)  Her PS is 0  The patient inquires about increasing her Lenvima dose now that her BP has been controlled  I advised her to continue with 4mg dose as she is currently stable on this medication and I am concerned that she will experience hypertensive urgency again  I advised her to discuss with Dr Cielo Malik at her next visit in 3 weeks  She was agreeable  Labs will be reviewed prior to next treatment  CT will be performed prior to her next office visit  Relevant Orders    CT chest abdomen pelvis w contrast               Reason for visit is pre-chemo visit  Chief Complaint   Patient presents with   • Virtual Regular Visit        Encounter provider NEERAJ Kan    Provider located at [address] [id number] [phone]      Recent Visits  Date Type Provider Dept   10/17/22 Telephone [facility] recent visits within past 7 days and meeting all other requirements  Today's Visits  Date Type Provider Dept   10/20/22 Telemedicine Holland Asencio, [address] [place] [address] today's visits and meeting all other requirements  Future Appointments  No visits were found meeting these conditions    Showing future appointments within next 150 days and meeting all other requirements The patient was identified by name and date of birth  Joy Pendleton was informed that this is a telemedicine visit and that the visit is being conducted through Telephone  It was my intent to perform this visit with video, but the patient did not have video capabilities today  My office door was closed  No one else was in the room  She acknowledged consent and understanding of privacy and security of the video platform  The patient has agreed to participate and understands they can discontinue the visit at any time  Patient is aware this is a billable service  Subjective  Joy Pendleton is a 75 y o  female       This is a 75 y o  female who presents to the office for pre-chemotherapy evaluation  She has been tolerating chemotherapy well and is without acute complaints  She has been afebrile  She denies nausea or vomiting  Her appetite is appropriate  She is voiding and moving her bowels without difficulty  She denies abdominal or pelvic pain  The patient is without vaginal bleeding or discharge  She endorses good control of her BP and is without headaches or visual changes  She is ambulatory             Past Medical History:   Diagnosis Date   • Anxiety    • Arthritis    • Back pain    • Cancer (HCC)    • Depression    • Diabetes mellitus ([address] )    • Disease of thyroid gland    • Endometrial cancer ([address] )    • GERD (gastroesophageal reflux disease)    • Hyperlipidemia associated with type 2 diabetes mellitus ([address] )    • Hypertension    • Morbid obesity with BMI of 40 0-44 9, adult ([address] )    • Type 2 diabetes mellitus without complication ([address] )    • Urinary incontinence    • Wears glasses        Past Surgical History:   Procedure Laterality Date   • ABDOMINAL ADHESION SURGERY N/A 5/31/2022    Procedure: Sage F[place];  Surgeon: Johnny Rodriguez MD;  Location: [facility];  Service: Gynecology Oncology   • CATARACT EXTRACTION W/ INTRAOCULAR LENS IMPLANT Bilateral    • CHOLECYSTECTOMY OPEN     • CYSTOSCOPY N/A 5/31/2022    Procedure: CYSTOSCOPY;  Surgeon: Fern Wayne MD;  Location: [facility];  Service: Gynecology Oncology   • IR PORT PLACEMENT  03/07/2022   • NY LAPAROSCOPY W TOT HYSTERECTUTERUS <=250 GRAM  W TUBE/OVARY N/A 5/31/2022    Procedure: ROBOTIC ASSISTED TOTAL LAPAROSCOPIC HYSTERECTOMY, RIGHT SALPINGO-OOPHORECTOMY, LEFT SALPINGECTOMY,;  Surgeon: Fern Wayne MD;  Location: [facility];  Service: Gynecology Oncology   • SALPINGOOPHORECTOMY N/A     only had one removed and not sure which   • TONSILLECTOMY         Current Outpatient Medications   Medication Sig Dispense Refill   • acetaminophen (TYLENOL) 650 mg CR tablet Take 1,300 mg by mouth in the morning and 1,300 mg before bedtime  • amLODIPine (NORVASC) 5 mg tablet Take 1 tablet (5 mg total) by mouth daily 30 tablet 0   • BD Pen Needle Yoana 2nd Gen 32G X 4 MM MISC 2 (two) times a day     • Contour Next Test test strip 2 (two) times a day Test     • fexofenadine (ALLEGRA) 180 MG tablet every 24 hours     • furosemide (LASIX) 40 mg tablet Take 1 tablet by mouth daily     • gabapentin (NEURONTIN) 300 mg capsule Take 300 mg by mouth 3 (three) times a day     • Lenvatinib, 4 MG Daily Dose, (Lenvima, 4 MG Daily Dose,) 4 MG CPPK Take 4 mg by mouth in the morning 30 each 3   • lidocaine-prilocaine (EMLA) cream Apply to port site 30 min prior to labs/chemo 30 g 2   • lisinopril (ZESTRIL) 40 mg tablet Take 1 tablet (40 mg total) by mouth daily 30 tablet 0   • LORazepam (ATIVAN) 0 5 mg tablet Take 0 5 mg by mouth in the morning and 0 5 mg in the evening  • magnesium oxide (MAG-OX) 400 mg Take 400 mg by mouth in the morning       • metFORMIN (GLUCOPHAGE) 1000 MG tablet Take 1 tablet by mouth 2 (two) times a day with meals     • Microlet Lancets MISC 2 (two) times a day Test     • omeprazole (PriLOSEC) 20 mg delayed release capsule every 24 hours     • ondansetron (ZOFRAN) 8 mg tablet Take 1 tablet (8 mg total) by mouth every 8 (eight) hours as needed for nausea or vomiting 20 tablet 1   • Rosuvastatin Calcium 20 MG CPSP Take 1 tablet by mouth daily     • sertraline (ZOLOFT) 50 mg tablet Take 50 mg by mouth daily     • sitaGLIPtin (JANUVIA) 100 mg tablet Take 1 tablet by mouth daily     • Toujeo SoloStar 300 units/mL CONCENTRATED U-300 injection pen (1-unit dial) every 12 (twelve) hours 34 units in the am and 36 units at night       No current facility-administered medications for this visit  Allergies   Allergen Reactions   • Empagliflozin Other (See Comments)     Yeast infection   • Exenatide Nausea Only   • Glipizide Other (See Comments)     hypoglycemic   • Repaglinide Nausea Only     Oncology History   Endometrial cancer ([facility] [address] )   1/26/2022 Initial Diagnosis    Endometrial cancer ([facility] [address] )     2/17/2022 -  Cancer Staged    Staging form: Corpus Uteri - Carcinoma, AJCC 8th Edition  - Clinical: FIGO Stage IVB (cM1) - Signed by Lala Shah MD on 2/17/2022  Histologic grade (G): G3  Histologic grading system: 3 grade system       2/24/2022 -  Chemotherapy    Taxol 175 mg/m2 and carboplatin AUC 6 every 21 days  Taxol dose-reduced to 135 mg/m2 with cycle 2 due to arthralgias/myalgias  She has received 4 cycles in the neoadjuvant setting, and 2 following aborting debulking  Carboplatin dose-reduced to AUC 5 with cycle 5 d/t thrombocytopenia  3/11/2022 Genomic Testing    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, ER/TN positive, microsatellite stable  No other targeted therapy opportunities  5/31/2022 Surgery    Robotic-assisted BSO and lysis of adhesions  Hysterectomy aborted due to parametrial/cervical involvement and adhesive disease       9/15/2022 -  Chemotherapy    pembrolizumab (KEYTRUDA) IVPB, 200 mg, Intravenous, Once, 2 of 4 cycles  Administration: 200 mg (9/15/2022), 200 mg (10/6/2022)         Review of Systems   Constitutional: Negative for activity change, appetite change, chills, fatigue, fever and unexpected weight change  HENT: Negative for mouth sores  Eyes: Negative  Respiratory: Negative for cough, chest tightness, shortness of breath and wheezing  Cardiovascular: Negative for chest pain, palpitations and leg swelling  Gastrointestinal: Negative for abdominal distention, abdominal pain, anal bleeding, blood in stool, constipation, diarrhea, nausea and vomiting  Endocrine: Negative  Genitourinary: Negative for difficulty urinating, dysuria, flank pain, frequency, hematuria, pelvic pain, urgency, vaginal bleeding, vaginal discharge and vaginal pain  Musculoskeletal: Negative for arthralgias and myalgias  Skin: Negative for color change, pallor and rash  Neurological: Negative for dizziness, weakness, numbness and headaches  Hematological: Negative  Psychiatric/Behavioral: The patient is not nervous/anxious              I spent 15 minutes directly with the patient during this visit

## 2022-10-24 ENCOUNTER — APPOINTMENT (OUTPATIENT)
Dept: LAB | Facility: HOSPITAL | Age: 67
End: 2022-10-24
Payer: COMMERCIAL

## 2022-10-24 DIAGNOSIS — C54.1 ENDOMETRIAL CANCER (HCC): ICD-10-CM

## 2022-10-24 LAB
AMYLASE SERPL-CCNC: 50 IU/L (ref 25–115)
CREAT UR-MCNC: 186 MG/DL
LIPASE SERPL-CCNC: 168 U/L (ref 73–393)
PROT UR-MCNC: 26 MG/DL
PROT/CREAT UR: 0.14 MG/G{CREAT} (ref 0–0.1)
TSH SERPL DL<=0.05 MIU/L-ACNC: 2.08 UIU/ML (ref 0.45–4.5)

## 2022-10-24 PROCEDURE — 82150 ASSAY OF AMYLASE: CPT

## 2022-10-24 PROCEDURE — 84443 ASSAY THYROID STIM HORMONE: CPT

## 2022-10-24 PROCEDURE — 83690 ASSAY OF LIPASE: CPT

## 2022-10-24 PROCEDURE — 82570 ASSAY OF URINE CREATININE: CPT

## 2022-10-24 PROCEDURE — 84156 ASSAY OF PROTEIN URINE: CPT

## 2022-10-26 RX ORDER — SODIUM CHLORIDE 9 MG/ML
20 INJECTION, SOLUTION INTRAVENOUS ONCE
Status: CANCELLED | OUTPATIENT
Start: 2022-10-27

## 2022-10-27 ENCOUNTER — HOSPITAL ENCOUNTER (OUTPATIENT)
Dept: INFUSION CENTER | Facility: HOSPITAL | Age: 67
Discharge: HOME | End: 2022-10-27
Attending: OBSTETRICS & GYNECOLOGY

## 2022-10-27 ENCOUNTER — PATIENT OUTREACH (OUTPATIENT)
Dept: HEMATOLOGY ONCOLOGY | Facility: CLINIC | Age: 67
End: 2022-10-27

## 2022-10-27 VITALS
TEMPERATURE: 97.3 F | DIASTOLIC BLOOD PRESSURE: 68 MMHG | BODY MASS INDEX: 37.93 KG/M2 | WEIGHT: 214.07 LBS | SYSTOLIC BLOOD PRESSURE: 126 MMHG | RESPIRATION RATE: 16 BRPM | OXYGEN SATURATION: 97 % | HEART RATE: 95 BPM | HEIGHT: 63 IN

## 2022-10-27 DIAGNOSIS — C54.1 ENDOMETRIAL CANCER (HCC): Primary | ICD-10-CM

## 2022-10-27 RX ORDER — SODIUM CHLORIDE 9 MG/ML
20 INJECTION, SOLUTION INTRAVENOUS ONCE
Status: COMPLETED | OUTPATIENT
Start: 2022-10-27 | End: 2022-10-27

## 2022-10-27 RX ADMIN — SODIUM CHLORIDE 200 MG: 9 INJECTION, SOLUTION INTRAVENOUS at 10:31

## 2022-10-27 RX ADMIN — SODIUM CHLORIDE 20 ML/HR: 9 INJECTION, SOLUTION INTRAVENOUS at 10:25

## 2022-10-27 NOTE — PROGRESS NOTES
MSW met with the pt and her spouse in the infusion center this day  Pt was pleasant and open to conversation  She denies pain at this time  Pt was wearing a new wig that she was able to purchase through [employer] in [place]  She is pleased with how she looks in it and how it feels on her  She states she purchased another wig on line and was not pleased with how it looked so she is having the stylist at the wig salon assist her with this  Since meeting with her last, the pt was able to witness her sons wedding and she was sharing photos  They spoke about this enjoyable day with her son, his new wife and her parents as they all went to dinner to celebrate  At this time, the pt has no issues or concerns  Support was offered and MSW will continue to follow

## 2022-10-27 NOTE — PROGRESS NOTES
Keytruda tolerated well, pt offered no complaints  PIV removed and AVS provided  Pt is aware of next appts

## 2022-10-31 ENCOUNTER — APPOINTMENT (OUTPATIENT)
Dept: LAB | Facility: HOSPITAL | Age: 67
End: 2022-10-31

## 2022-11-03 ENCOUNTER — HOSPITAL ENCOUNTER (OUTPATIENT)
Dept: CT IMAGING | Facility: HOSPITAL | Age: 67
Discharge: HOME | End: 2022-11-03

## 2022-11-03 DIAGNOSIS — C54.1 ENDOMETRIAL CANCER (HCC): ICD-10-CM

## 2022-11-03 RX ADMIN — IOHEXOL 100 ML: 350 INJECTION, SOLUTION INTRAVENOUS at 13:32

## 2022-11-04 ENCOUNTER — TELEPHONE (OUTPATIENT)
Dept: GYNECOLOGIC ONCOLOGY | Facility: CLINIC | Age: 67
End: 2022-11-04

## 2022-11-04 NOTE — TELEPHONE ENCOUNTER
Radiology called into the office regarding significant findings on the latest scan of the chest, abd, and pelvis

## 2022-11-07 ENCOUNTER — APPOINTMENT (OUTPATIENT)
Dept: LAB | Facility: HOSPITAL | Age: 67
End: 2022-11-07

## 2022-11-10 ENCOUNTER — OFFICE VISIT (OUTPATIENT)
Dept: GYNECOLOGIC ONCOLOGY | Facility: HOSPITAL | Age: 67
End: 2022-11-10

## 2022-11-10 VITALS
WEIGHT: 214 LBS | DIASTOLIC BLOOD PRESSURE: 58 MMHG | HEIGHT: 63 IN | TEMPERATURE: 98.3 F | SYSTOLIC BLOOD PRESSURE: 118 MMHG | BODY MASS INDEX: 37.92 KG/M2

## 2022-11-10 DIAGNOSIS — C54.1 ENDOMETRIAL CANCER (HCC): Primary | ICD-10-CM

## 2022-11-10 RX ORDER — AMLODIPINE BESYLATE 10 MG/1
TABLET ORAL
COMMUNITY
Start: 2022-10-14

## 2022-11-10 NOTE — LETTER
November 10, 2022     Cosmo Lucas DO  [address] [facility] [address]    Patient: London Mclean   YOB: 1955   Date of Visit: 11/10/2022       Dear Dr Haji Stage:    Thank you for referring Brent Matamoros to me for evaluation  Below are my notes for this consultation  If you have questions, please do not hesitate to call me  I look forward to following your patient along with you  Sincerely,        Leonard Coelho MD        CC: No Recipients  Leonard Coelho MD  11/10/2022 10:54 AM  Sign when Signing Visit  Assessment/Plan:    Problem List Items Addressed This Visit        Genitourinary    Endometrial cancer [facility]) - Primary     27-year-old with progressive stage IV B high-grade endometrial cancer currently receiving palliative chemotherapy with Keytruda and lenvatinib  She is received 3 cycles of treatment  I reviewed her CBC, CMP, TSH, amylase, lipase, CT images from the chest abdomen pelvis  She has progressive lymphadenopathy and increasing uterine size  She is been tolerating treatment very well at the current dose of lenvatinib 4 mg  Blood pressures are well controlled  Her performance status is 0   1  I discussed the CT imaging in detail  2  We discussed treatment options including increasing lenvatinib dose to 10 mg verses starting liposomal doxorubicin as a single agent or starting megace as her malignancy is ER positive  Based on the risks and benefits and her current quality of life, she would prefer to continue with [place] and lenvatinib with the higher dose of lenvatinib at 10 mg daily  3  She was encouraged to continue home blood pressure monitoring with the increasing dose of lenvatinib given her history of hypertensive urgency                     CHIEF COMPLAINT:  Treatment discussion and evaluation      Problem:  Cancer Staging  Endometrial cancer [facility])  Staging form: Corpus Uteri - Carcinoma, AJCC 8th Edition  - Clinical: FIGO Stage IVB (cM1) - Signed by Vania Shields MD on 2/17/2022        Previous therapy:  Oncology History   Endometrial cancer ([facility] [address] )   1/26/2022 Initial Diagnosis    Endometrial cancer ([facility] [address] )     2/17/2022 -  Cancer Staged    Staging form: Corpus Uteri - Carcinoma, AJCC 8th Edition  - Clinical: FIGO Stage IVB (cM1) - Signed by Vania Shields MD on 2/17/2022  Histologic grade (G): G3  Histologic grading system: 3 grade system       2/24/2022 -  Chemotherapy    Taxol 175 mg/m2 and carboplatin AUC 6 every 21 days  Taxol dose-reduced to 135 mg/m2 with cycle 2 due to arthralgias/myalgias  She has received 4 cycles in the neoadjuvant setting, and 2 following aborting debulking  Carboplatin dose-reduced to AUC 5 with cycle 5 d/t thrombocytopenia  3/11/2022 Genomic Testing    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, ER/[place] positive, microsatellite stable  No other targeted therapy opportunities  5/31/2022 Surgery    Robotic-assisted BSO and lysis of adhesions  Hysterectomy aborted due to parametrial/cervical involvement and adhesive disease  9/15/2022 -  Chemotherapy    pembrolizumab (KEYTRUDA) IVPB, 200 mg, Intravenous, Once, 3 of 7 cycles  Administration: 200 mg (9/15/2022), 200 mg (10/6/2022), 200 mg (10/27/2022)           Patient ID: Jennifer Millan is a 75 y o  female  Returns for treatment discussion after 3 cycles of palliative keytruda and lenvatinib  Her current lenvatinib dose is 4 mg  She feels well  She has occasional vaginal bleeding which is not heavy  She is been losing weight  Overall she feels like she has much more energy than before  She is able to perform her activities of daily living without difficulty  Labs from 11/7/2022 revealed a hemoglobin of 9 7 grams/deciliter, total white blood cell count of 3 7, platelet count 344 K  Labs from 10/24/2022 revealed a normal TSH, amylase, lipase  Glucose was 214 mg/dL    CT of chest abdomen pelvis on 11/3/2022 revealed unchanged pulmonary nodules but increasing supraclavicular lymphadenopathy, retroperitoneal lymphadenopathy, decreasing hilar lymphadenopathy, increasing uterine size  She states her blood pressures are normal       The following portions of the patient's history were reviewed and updated as appropriate: allergies, current medications, past family history, past medical history, past social history, past surgical history and problem list     Review of Systems   Constitutional: Negative for activity change and unexpected weight change  HENT: Negative  Eyes: Negative  Respiratory: Negative  Cardiovascular: Negative  Gastrointestinal: Negative for abdominal distention and abdominal pain  Endocrine: Negative  Genitourinary: Negative for pelvic pain and vaginal bleeding  Musculoskeletal: Negative  Skin: Negative  Allergic/Immunologic: Negative  Neurological: Negative  Hematological: Negative  Psychiatric/Behavioral: Negative  Current Outpatient Medications   Medication Sig Dispense Refill   • amLODIPine (NORVASC) 10 mg tablet      • acetaminophen (TYLENOL) 650 mg CR tablet Take 1,300 mg by mouth in the morning and 1,300 mg before bedtime       • amLODIPine (NORVASC) 5 mg tablet Take 1 tablet (5 mg total) by mouth daily (Patient not taking: Reported on 11/10/2022) 30 tablet 0   • BD Pen Needle Yoana 2nd Gen 32G X 4 MM MISC 2 (two) times a day     • Contour Next Test test strip 2 (two) times a day Test     • fexofenadine (ALLEGRA) 180 MG tablet every 24 hours     • furosemide (LASIX) 40 mg tablet Take 1 tablet by mouth daily     • gabapentin (NEURONTIN) 300 mg capsule Take 300 mg by mouth 3 (three) times a day     • Lenvatinib, 4 MG Daily Dose, (Lenvima, 4 MG Daily Dose,) 4 MG CPPK Take 4 mg by mouth in the morning 30 each 3   • lidocaine-prilocaine (EMLA) cream Apply to port site 30 min prior to labs/chemo 30 g 2   • lisinopril (ZESTRIL) 40 mg tablet Take 1 tablet (40 mg total) by mouth daily 30 tablet 0   • LORazepam (ATIVAN) 0 5 mg tablet Take 0 5 mg by mouth in the morning and 0 5 mg in the evening  • magnesium oxide (MAG-OX) 400 mg Take 400 mg by mouth in the morning  • metFORMIN (GLUCOPHAGE) 1000 MG tablet Take 1 tablet by mouth 2 (two) times a day with meals     • Microlet Lancets MISC 2 (two) times a day Test     • omeprazole (PriLOSEC) 20 mg delayed release capsule every 24 hours     • ondansetron (ZOFRAN) 8 mg tablet Take 1 tablet (8 mg total) by mouth every 8 (eight) hours as needed for nausea or vomiting 20 tablet 1   • Rosuvastatin Calcium 20 MG CPSP Take 1 tablet by mouth daily     • sertraline (ZOLOFT) 50 mg tablet Take 50 mg by mouth daily     • sitaGLIPtin (JANUVIA) 100 mg tablet Take 1 tablet by mouth daily     • Toujeo SoloStar 300 units/mL CONCENTRATED U-300 injection pen (1-unit dial) every 12 (twelve) hours 34 units in the am and 36 units at night       No current facility-administered medications for this visit  Objective:    Blood pressure 118/58, temperature 98 3 °F (36 8 °C), temperature source Tympanic, height 5' 3 23" (1 606 m), weight 97 1 kg (214 lb)  Body mass index is 37 63 kg/m²  Body surface area is 2 meters squared  Physical Exam  Vitals reviewed  Constitutional:       General: She is not in acute distress  Appearance: Normal appearance  She is obese  She is not ill-appearing, toxic-appearing or diaphoretic  HENT:      Head: Normocephalic and atraumatic  Mouth/Throat:      Mouth: Mucous membranes are moist    Eyes:      General: No scleral icterus  Right eye: No discharge  Left eye: No discharge  Conjunctiva/sclera: Conjunctivae normal    Pulmonary:      Effort: Pulmonary effort is normal    Musculoskeletal:      Right lower leg: No edema  Left lower leg: No edema  Skin:     General: Skin is warm  Coloration: Skin is not jaundiced  Findings: No rash     Neurological:      General: No focal deficit present  Mental Status: She is alert and oriented to person, place, and time  Cranial Nerves: No cranial nerve deficit  Motor: No weakness  Gait: Gait normal    Psychiatric:         Mood and Affect: Mood normal          Behavior: Behavior normal          Thought Content:  Thought content normal          Judgment: Judgment normal          Lab Results   Component Value Date     4 8 10/24/2022     Lab Results   Component Value Date    K 4 0 11/07/2022     11/07/2022    CO2 22 11/07/2022    BUN 19 11/07/2022    CREATININE 1 07 11/07/2022    GLUF 278 (H) 10/24/2022    CALCIUM 10 0 11/07/2022    CORRECTEDCA 10 5 (H) 11/07/2022    AST 24 11/07/2022    ALT 31 11/07/2022    ALKPHOS 102 11/07/2022    EGFR 53 11/07/2022     Lab Results   Component Value Date    WBC 5 74 11/07/2022    HGB 9 7 (L) 11/07/2022    HCT 33 8 (L) 11/07/2022    MCV 93 11/07/2022     11/07/2022     Lab Results   Component Value Date    NEUTROABS 3 61 11/07/2022        Trend:  Lab Results   Component Value Date     4 8 10/24/2022     7 9 10/03/2022     7 3 09/12/2022     12 6 08/08/2022     14 3 07/11/2022     12 2 05/10/2022     13 6 04/25/2022     10 9 04/04/2022     11 4 03/15/2022     10 9 02/28/2022

## 2022-11-10 NOTE — ASSESSMENT & PLAN NOTE
68-year-old with progressive stage IV B high-grade endometrial cancer currently receiving palliative chemotherapy with Keytruda and lenvatinib  She is received 3 cycles of treatment  I reviewed her CBC, CMP, TSH, amylase, lipase, CT images from the chest abdomen pelvis  She has progressive lymphadenopathy and increasing uterine size  She is been tolerating treatment very well at the current dose of lenvatinib 4 mg  Blood pressures are well controlled  Her performance status is 0   1  I discussed the CT imaging in detail  2  We discussed treatment options including increasing lenvatinib dose to 10 mg verses starting liposomal doxorubicin as a single agent or starting megace as her malignancy is ER positive  Based on the risks and benefits and her current quality of life, she would prefer to continue with [place] and lenvatinib with the higher dose of lenvatinib at 10 mg daily  3  She was encouraged to continue home blood pressure monitoring with the increasing dose of lenvatinib given her history of hypertensive urgency

## 2022-11-10 NOTE — PROGRESS NOTES
Assessment/Plan:    Problem List Items Addressed This Visit        Genitourinary    Endometrial cancer ([facility] ) - Primary     53-year-old with progressive stage IV B high-grade endometrial cancer currently receiving palliative chemotherapy with Keytruda and lenvatinib  She is received 3 cycles of treatment  I reviewed her CBC, CMP, TSH, amylase, lipase, CT images from the chest abdomen pelvis  She has progressive lymphadenopathy and increasing uterine size  She is been tolerating treatment very well at the current dose of lenvatinib 4 mg  Blood pressures are well controlled  Her performance status is 0   1  I discussed the CT imaging in detail  2  We discussed treatment options including increasing lenvatinib dose to 10 mg verses starting liposomal doxorubicin as a single agent or starting megace as her malignancy is ER positive  Based on the risks and benefits and her current quality of life, she would prefer to continue with [place] and lenvatinib with the higher dose of lenvatinib at 10 mg daily  3  She was encouraged to continue home blood pressure monitoring with the increasing dose of lenvatinib given her history of hypertensive urgency  CHIEF COMPLAINT:  Treatment discussion and evaluation      Problem:  Cancer Staging  Endometrial cancer [facility])  Staging form: Corpus Uteri - Carcinoma, AJCC 8th Edition  - Clinical: FIGO Stage IVB (cM1) - Signed by Chiara Ramos MD on 2/17/2022        Previous therapy:  Oncology History   Endometrial cancer ([place] [address] )   1/26/2022 Initial Diagnosis    Endometrial cancer ([place] [address] )     2/17/2022 -  Cancer Staged    Staging form: Corpus Uteri - Carcinoma, AJCC 8th Edition  - Clinical: FIGO Stage IVB (cM1) - Signed by Chiara Ramos MD on 2/17/2022  Histologic grade (G): G3  Histologic grading system: 3 grade system       2/24/2022 -  Chemotherapy    Taxol 175 mg/m2 and carboplatin AUC 6 every 21 days   Taxol dose-reduced to 135 mg/m2 with cycle 2 due to arthralgias/myalgias  She has received 4 cycles in the neoadjuvant setting, and 2 following aborting debulking  Carboplatin dose-reduced to AUC 5 with cycle 5 d/t thrombocytopenia  3/11/2022 Genomic Testing    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, [place] positive, microsatellite stable  No other targeted therapy opportunities  5/31/2022 Surgery    Robotic-assisted BSO and lysis of adhesions  Hysterectomy aborted due to parametrial/cervical involvement and adhesive disease  9/15/2022 -  Chemotherapy    pembrolizumab (KEYTRUDA) IVPB, 200 mg, Intravenous, Once, 3 of 7 cycles  Administration: 200 mg (9/15/2022), 200 mg (10/6/2022), 200 mg (10/27/2022)           Patient ID: Jennifer Millan is a 75 y o  female  Returns for treatment discussion after 3 cycles of palliative keytruda and lenvatinib  Her current lenvatinib dose is 4 mg  She feels well  She has occasional vaginal bleeding which is not heavy  She is been losing weight  Overall she feels like she has much more energy than before  She is able to perform her activities of daily living without difficulty  Labs from 11/7/2022 revealed a hemoglobin of 9 7 grams/deciliter, total white blood cell count of 3 7, platelet count 183 K  Labs from 10/24/2022 revealed a normal TSH, amylase, lipase  Glucose was 214 mg/dL  CT of chest abdomen pelvis on 11/3/2022 revealed unchanged pulmonary nodules but increasing supraclavicular lymphadenopathy, retroperitoneal lymphadenopathy, decreasing hilar lymphadenopathy, increasing uterine size  She states her blood pressures are normal       The following portions of the patient's history were reviewed and updated as appropriate: allergies, current medications, past family history, past medical history, past social history, past surgical history and problem list     Review of Systems   Constitutional: Negative for activity change and unexpected weight change  HENT: Negative  Eyes: Negative  Respiratory: Negative  Cardiovascular: Negative  Gastrointestinal: Negative for abdominal distention and abdominal pain  Endocrine: Negative  Genitourinary: Negative for pelvic pain and vaginal bleeding  Musculoskeletal: Negative  Skin: Negative  Allergic/Immunologic: Negative  Neurological: Negative  Hematological: Negative  Psychiatric/Behavioral: Negative  Current Outpatient Medications   Medication Sig Dispense Refill   • amLODIPine (NORVASC) 10 mg tablet      • acetaminophen (TYLENOL) 650 mg CR tablet Take 1,300 mg by mouth in the morning and 1,300 mg before bedtime  • amLODIPine (NORVASC) 5 mg tablet Take 1 tablet (5 mg total) by mouth daily (Patient not taking: Reported on 11/10/2022) 30 tablet 0   • BD Pen Needle Yoana 2nd Gen 32G X 4 MM MISC 2 (two) times a day     • Contour Next Test test strip 2 (two) times a day Test     • fexofenadine (ALLEGRA) 180 MG tablet every 24 hours     • furosemide (LASIX) 40 mg tablet Take 1 tablet by mouth daily     • gabapentin (NEURONTIN) 300 mg capsule Take 300 mg by mouth 3 (three) times a day     • Lenvatinib, 4 MG Daily Dose, (Lenvima, 4 MG Daily Dose,) 4 MG CPPK Take 4 mg by mouth in the morning 30 each 3   • lidocaine-prilocaine (EMLA) cream Apply to port site 30 min prior to labs/chemo 30 g 2   • lisinopril (ZESTRIL) 40 mg tablet Take 1 tablet (40 mg total) by mouth daily 30 tablet 0   • LORazepam (ATIVAN) 0 5 mg tablet Take 0 5 mg by mouth in the morning and 0 5 mg in the evening  • magnesium oxide (MAG-OX) 400 mg Take 400 mg by mouth in the morning       • metFORMIN (GLUCOPHAGE) 1000 MG tablet Take 1 tablet by mouth 2 (two) times a day with meals     • Microlet Lancets MISC 2 (two) times a day Test     • omeprazole (PriLOSEC) 20 mg delayed release capsule every 24 hours     • ondansetron (ZOFRAN) 8 mg tablet Take 1 tablet (8 mg total) by mouth every 8 (eight) hours as needed for nausea or vomiting 20 tablet 1   • Rosuvastatin Calcium 20 MG CPSP Take 1 tablet by mouth daily     • sertraline (ZOLOFT) 50 mg tablet Take 50 mg by mouth daily     • sitaGLIPtin (JANUVIA) 100 mg tablet Take 1 tablet by mouth daily     • Toujeo SoloStar 300 units/mL CONCENTRATED U-300 injection pen (1-unit dial) every 12 (twelve) hours 34 units in the am and 36 units at night       No current facility-administered medications for this visit  Objective:    Blood pressure 118/58, temperature 98 3 °F (36 8 °C), temperature source Tympanic, height 5' 3 23" (1 606 m), weight 97 1 kg (214 lb)  Body mass index is 37 63 kg/m²  Body surface area is 2 meters squared  Physical Exam  Vitals reviewed  Constitutional:       General: She is not in acute distress  Appearance: Normal appearance  She is obese  She is not ill-appearing, toxic-appearing or diaphoretic  HENT:      Head: Normocephalic and atraumatic  Mouth/Throat:      Mouth: Mucous membranes are moist    Eyes:      General: No scleral icterus  Right eye: No discharge  Left eye: No discharge  Conjunctiva/sclera: Conjunctivae normal    Pulmonary:      Effort: Pulmonary effort is normal    Musculoskeletal:      Right lower leg: No edema  Left lower leg: No edema  Skin:     General: Skin is warm  Coloration: Skin is not jaundiced  Findings: No rash  Neurological:      General: No focal deficit present  Mental Status: She is alert and oriented to person, place, and time  Cranial Nerves: No cranial nerve deficit  Motor: No weakness  Gait: Gait normal    Psychiatric:         Mood and Affect: Mood normal          Behavior: Behavior normal          Thought Content:  Thought content normal          Judgment: Judgment normal          Lab Results   Component Value Date     4 8 10/24/2022     Lab Results   Component Value Date    K 4 0 11/07/2022     11/07/2022    CO2 22 11/07/2022    BUN 19 11/07/2022    CREATININE 1 07 11/07/2022    GLUF 278 (H) 10/24/2022    CALCIUM 10 0 11/07/2022    CORRECTEDCA 10 5 (H) 11/07/2022    AST 24 11/07/2022    ALT 31 11/07/2022    ALKPHOS 102 11/07/2022    EGFR 53 11/07/2022     Lab Results   Component Value Date    WBC 5 74 11/07/2022    HGB 9 7 (L) 11/07/2022    HCT 33 8 (L) 11/07/2022    MCV 93 11/07/2022     11/07/2022     Lab Results   Component Value Date    NEUTROABS 3 61 11/07/2022        Trend:  Lab Results   Component Value Date     4 8 10/24/2022     7 9 10/03/2022     7 3 09/12/2022     12 6 08/08/2022     14 3 07/11/2022     12 2 05/10/2022     13 6 04/25/2022     10 9 04/04/2022     11 4 03/15/2022     10 9 02/28/2022

## 2022-11-14 ENCOUNTER — APPOINTMENT (OUTPATIENT)
Dept: LAB | Facility: HOSPITAL | Age: 67
End: 2022-11-14

## 2022-11-14 LAB
ALBUMIN SERPL BCP-MCNC: 2.9 G/DL (ref 3.5–5)
ALP SERPL-CCNC: 104 U/L (ref 46–116)
ALT SERPL W P-5'-P-CCNC: 23 U/L (ref 12–78)
AMYLASE SERPL-CCNC: 37 IU/L (ref 25–115)
ANION GAP SERPL CALCULATED.3IONS-SCNC: 10 MMOL/L (ref 4–13)
AST SERPL W P-5'-P-CCNC: 17 U/L (ref 5–45)
BASOPHILS # BLD AUTO: 0.02 THOUSANDS/ÂΜL (ref 0–0.1)
BASOPHILS NFR BLD AUTO: 0 % (ref 0–1)
BILIRUB SERPL-MCNC: 0.46 MG/DL (ref 0.2–1)
BUN SERPL-MCNC: 16 MG/DL (ref 5–25)
CALCIUM ALBUM COR SERPL-MCNC: 10.1 MG/DL (ref 8.3–10.1)
CALCIUM SERPL-MCNC: 9.2 MG/DL (ref 8.3–10.1)
CANCER AG125 SERPL-ACNC: 6 U/ML (ref 0–30)
CHLORIDE SERPL-SCNC: 105 MMOL/L (ref 96–108)
CO2 SERPL-SCNC: 23 MMOL/L (ref 21–32)
CREAT SERPL-MCNC: 1.08 MG/DL (ref 0.6–1.3)
CREAT UR-MCNC: 272 MG/DL
EOSINOPHIL # BLD AUTO: 0.17 THOUSAND/ÂΜL (ref 0–0.61)
EOSINOPHIL NFR BLD AUTO: 2 % (ref 0–6)
ERYTHROCYTE [DISTWIDTH] IN BLOOD BY AUTOMATED COUNT: 17.3 % (ref 11.6–15.1)
GFR SERPL CREATININE-BSD FRML MDRD: 52 ML/MIN/1.73SQ M
GLUCOSE SERPL-MCNC: 273 MG/DL (ref 65–140)
HCT VFR BLD AUTO: 32.6 % (ref 34.8–46.1)
HGB BLD-MCNC: 9.6 G/DL (ref 11.5–15.4)
IMM GRANULOCYTES # BLD AUTO: 0.05 THOUSAND/UL (ref 0–0.2)
IMM GRANULOCYTES NFR BLD AUTO: 1 % (ref 0–2)
LIPASE SERPL-CCNC: 101 U/L (ref 73–393)
LYMPHOCYTES # BLD AUTO: 1.05 THOUSANDS/ÂΜL (ref 0.6–4.47)
LYMPHOCYTES NFR BLD AUTO: 13 % (ref 14–44)
MAGNESIUM SERPL-MCNC: 1.6 MG/DL (ref 1.6–2.6)
MCH RBC QN AUTO: 26.7 PG (ref 26.8–34.3)
MCHC RBC AUTO-ENTMCNC: 29.4 G/DL (ref 31.4–37.4)
MCV RBC AUTO: 91 FL (ref 82–98)
MONOCYTES # BLD AUTO: 1.05 THOUSAND/ÂΜL (ref 0.17–1.22)
MONOCYTES NFR BLD AUTO: 13 % (ref 4–12)
NEUTROPHILS # BLD AUTO: 5.54 THOUSANDS/ÂΜL (ref 1.85–7.62)
NEUTS SEG NFR BLD AUTO: 71 % (ref 43–75)
NRBC BLD AUTO-RTO: 0 /100 WBCS
PLATELET # BLD AUTO: 175 THOUSANDS/UL (ref 149–390)
PMV BLD AUTO: 10.3 FL (ref 8.9–12.7)
POTASSIUM SERPL-SCNC: 3.8 MMOL/L (ref 3.5–5.3)
PROT SERPL-MCNC: 6.8 G/DL (ref 6.4–8.4)
PROT UR-MCNC: 58 MG/DL
PROT/CREAT UR: 0.21 MG/G{CREAT} (ref 0–0.1)
RBC # BLD AUTO: 3.6 MILLION/UL (ref 3.81–5.12)
SODIUM SERPL-SCNC: 138 MMOL/L (ref 135–147)
TSH SERPL DL<=0.05 MIU/L-ACNC: 2.53 UIU/ML (ref 0.45–4.5)
WBC # BLD AUTO: 7.88 THOUSAND/UL (ref 4.31–10.16)

## 2022-11-16 RX ORDER — SODIUM CHLORIDE 9 MG/ML
20 INJECTION, SOLUTION INTRAVENOUS ONCE
Status: CANCELLED | OUTPATIENT
Start: 2022-11-17

## 2022-11-17 ENCOUNTER — HOSPITAL ENCOUNTER (OUTPATIENT)
Dept: INFUSION CENTER | Facility: HOSPITAL | Age: 67
Discharge: HOME | End: 2022-11-17
Attending: OBSTETRICS & GYNECOLOGY

## 2022-11-17 VITALS
SYSTOLIC BLOOD PRESSURE: 144 MMHG | DIASTOLIC BLOOD PRESSURE: 65 MMHG | TEMPERATURE: 97.3 F | HEIGHT: 63 IN | BODY MASS INDEX: 37.19 KG/M2 | OXYGEN SATURATION: 94 % | WEIGHT: 209.88 LBS | RESPIRATION RATE: 16 BRPM | HEART RATE: 106 BPM

## 2022-11-17 DIAGNOSIS — C54.1 ENDOMETRIAL CANCER (HCC): Primary | ICD-10-CM

## 2022-11-17 RX ORDER — SODIUM CHLORIDE 9 MG/ML
20 INJECTION, SOLUTION INTRAVENOUS ONCE
Status: COMPLETED | OUTPATIENT
Start: 2022-11-17 | End: 2022-11-17

## 2022-11-17 RX ADMIN — SODIUM CHLORIDE 200 MG: 9 INJECTION, SOLUTION INTRAVENOUS at 10:52

## 2022-11-17 RX ADMIN — SODIUM CHLORIDE 20 ML/HR: 9 INJECTION, SOLUTION INTRAVENOUS at 10:52

## 2022-11-21 ENCOUNTER — APPOINTMENT (OUTPATIENT)
Dept: LAB | Facility: HOSPITAL | Age: 67
End: 2022-11-21

## 2022-11-22 DIAGNOSIS — C54.1 ENDOMETRIAL CANCER (HCC): Primary | ICD-10-CM

## 2022-11-22 DIAGNOSIS — R79.89 ELEVATED SERUM CREATININE: Primary | ICD-10-CM

## 2022-11-22 DIAGNOSIS — C54.1 ENDOMETRIAL CANCER (HCC): ICD-10-CM

## 2022-11-22 RX ORDER — LENVATINIB 10 MG/1
10 CAPSULE ORAL DAILY
Qty: 30 EACH | Refills: 1 | Status: SHIPPED | OUTPATIENT
Start: 2022-11-22 | End: 2022-12-22

## 2022-11-27 ENCOUNTER — HOSPITAL ENCOUNTER (OUTPATIENT)
Dept: ULTRASOUND IMAGING | Facility: HOSPITAL | Age: 67
Discharge: HOME | End: 2022-11-27

## 2022-11-27 DIAGNOSIS — C54.1 ENDOMETRIAL CANCER (HCC): ICD-10-CM

## 2022-11-27 DIAGNOSIS — R79.89 ELEVATED SERUM CREATININE: ICD-10-CM

## 2022-11-28 ENCOUNTER — APPOINTMENT (OUTPATIENT)
Dept: LAB | Facility: HOSPITAL | Age: 67
End: 2022-11-28

## 2022-12-01 ENCOUNTER — OFFICE VISIT (OUTPATIENT)
Dept: GYNECOLOGIC ONCOLOGY | Facility: HOSPITAL | Age: 67
End: 2022-12-01

## 2022-12-01 VITALS
WEIGHT: 207.3 LBS | RESPIRATION RATE: 16 BRPM | TEMPERATURE: 97 F | DIASTOLIC BLOOD PRESSURE: 76 MMHG | BODY MASS INDEX: 36.73 KG/M2 | OXYGEN SATURATION: 98 % | SYSTOLIC BLOOD PRESSURE: 122 MMHG | HEART RATE: 114 BPM | HEIGHT: 63 IN

## 2022-12-01 DIAGNOSIS — C54.1 ENDOMETRIAL CANCER (HCC): Primary | ICD-10-CM

## 2022-12-01 DIAGNOSIS — T50.905A HYPERTENSION DUE TO DRUG: ICD-10-CM

## 2022-12-01 DIAGNOSIS — I15.8 HYPERTENSION DUE TO DRUG: ICD-10-CM

## 2022-12-01 NOTE — PROGRESS NOTES
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     Tolerating increase dose of lenvatinib 10 mg daily  Continue to closely monitor BPs  Genitourinary    Endometrial cancer ([facility] ) - Primary     Progressive stage IVB high-grade endometrial cancer currently receiving palliative keytruda 200 mg IV every 21 days and lenvatinib 10 mg daily  CT imaging after cycle 3 documented disease progression  At that time, patient was on lenvatinib 4mg daily due to drug-induced hypertension  Patient has been tolerating increased lenvatinib dose  Otherwise, she is tolerating treatment well  Continue with cycle 5 of treatment as planned as long as her metabolic and hematologic parameters are adequate  Return to the office as per her chemotherapy calendar  Plan for repeat imaging after completion of cycle 6  CHIEF COMPLAINT:   Pre-chemotherapy evaluation    Problem:  Cancer Staging   Endometrial cancer [facility])  Staging form: Corpus Uteri - Carcinoma, AJCC 8th Edition  - Clinical: FIGO Stage IVB (cM1) - Signed by Mitchel Pandya MD on 2/17/2022        Previous therapy:  Oncology History   Endometrial cancer ([place] [address] )   1/26/2022 Initial Diagnosis    Endometrial cancer ([place] [address] )     2/17/2022 -  Cancer Staged    Staging form: Corpus Uteri - Carcinoma, AJCC 8th Edition  - Clinical: FIGO Stage IVB (cM1) - Signed by Mitchel Pandya MD on 2/17/2022  Histologic grade (G): G3  Histologic grading system: 3 grade system       2/24/2022 -  Chemotherapy    Taxol 175 mg/m2 and carboplatin AUC 6 every 21 days  Taxol dose-reduced to 135 mg/m2 with cycle 2 due to arthralgias/myalgias  She has received 4 cycles in the neoadjuvant setting, and 2 following aborting debulking  Carboplatin dose-reduced to AUC 5 with cycle 5 d/t thrombocytopenia        3/11/2022 Genomic Testing    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, ER/NV positive, microsatellite stable  No other targeted therapy opportunities  5/31/2022 Surgery    Robotic-assisted BSO and lysis of adhesions  Hysterectomy aborted due to parametrial/cervical involvement and adhesive disease  9/15/2022 -  Chemotherapy    Keytruda 200 mg IV every 21 days with oral lenvatinib 10 mg daily (previously increased from 4 mg)  She is scheduled for cycle 5  Patient ID: Myranda Sandhu is a 75 y o  female  who presents to the office for pre-chemotherapy evaluation  Overall, she continues to tolerate treatment well  She is without new complaints  The patient has been afebrile  She notes her blood pressures are well controlled on 10 mg daily of lenvima  She denies n/v  Her appetite is reduced  Normal bowel/bladder function  She denies new/worsening cough/SOB, blood diarrhea, skin rashes  CBC/Diff, CMP, Mg from 11/28/22 reviewed  The following portions of the patient's history were reviewed and updated as appropriate: allergies, current medications, past medical history, past surgical history and problem list     Review of Systems   Constitutional: Negative  HENT: Positive for postnasal drip  Eyes: Negative  Respiratory: Negative  Cardiovascular: Negative  Gastrointestinal: Negative  Genitourinary: Positive for vaginal discharge  Musculoskeletal: Negative  Skin: Negative  Neurological: Negative  Psychiatric/Behavioral: Negative  Current Outpatient Medications   Medication Sig Dispense Refill   • acetaminophen (TYLENOL) 650 mg CR tablet Take 1,300 mg by mouth in the morning and 1,300 mg before bedtime       • amLODIPine (NORVASC) 10 mg tablet      • BD Pen Needle Yoana 2nd Gen 32G X 4 MM MISC 2 (two) times a day     • Contour Next Test test strip 2 (two) times a day Test     • fexofenadine (ALLEGRA) 180 MG tablet every 24 hours     • furosemide (LASIX) 40 mg tablet Take 1 tablet by mouth daily     • gabapentin (NEURONTIN) 300 mg capsule Take 300 mg by mouth 3 (three) times a day     • Lenvatinib, 10 MG Daily Dose, (Lenvima, 10 MG Daily Dose,) 10 MG CPPK Take 10 mg by mouth in the morning 30 each 1   • lidocaine-prilocaine (EMLA) cream Apply to port site 30 min prior to labs/chemo 30 g 2   • lisinopril (ZESTRIL) 40 mg tablet Take 1 tablet (40 mg total) by mouth daily 30 tablet 0   • LORazepam (ATIVAN) 0 5 mg tablet Take 0 5 mg by mouth in the morning and 0 5 mg in the evening  • magnesium oxide (MAG-OX) 400 mg Take 400 mg by mouth in the morning  • metFORMIN (GLUCOPHAGE) 1000 MG tablet Take 1 tablet by mouth 2 (two) times a day with meals     • Microlet Lancets MISC 2 (two) times a day Test     • omeprazole (PriLOSEC) 20 mg delayed release capsule every 24 hours     • ondansetron (ZOFRAN) 8 mg tablet Take 1 tablet (8 mg total) by mouth every 8 (eight) hours as needed for nausea or vomiting 20 tablet 1   • Rosuvastatin Calcium 20 MG CPSP Take 1 tablet by mouth daily     • sertraline (ZOLOFT) 50 mg tablet Take 50 mg by mouth daily     • sitaGLIPtin (JANUVIA) 100 mg tablet Take 1 tablet by mouth daily     • Toujeo SoloStar 300 units/mL CONCENTRATED U-300 injection pen (1-unit dial) every 12 (twelve) hours 34 units in the am and 36 units at night     • amLODIPine (NORVASC) 5 mg tablet Take 1 tablet (5 mg total) by mouth daily (Patient not taking: Reported on 11/10/2022) 30 tablet 0     No current facility-administered medications for this visit  Objective:    Blood pressure 122/76, pulse (!) 114, temperature (!) 97 °F (36 1 °C), temperature source Temporal, resp  rate 16, height 5' 3 23" (1 606 m), weight 94 kg (207 lb 4 8 oz), SpO2 98 %  Body mass index is 36 45 kg/m²  Body surface area is 1 97 meters squared  Physical Exam  Vitals reviewed  Constitutional:       General: She is not in acute distress  Appearance: Normal appearance  She is not ill-appearing  HENT:      Head: Normocephalic and atraumatic        Mouth/Throat:      Mouth: Mucous membranes are moist    Eyes:      General: No scleral icterus  Right eye: No discharge  Left eye: No discharge  Conjunctiva/sclera: Conjunctivae normal    Pulmonary:      Effort: Pulmonary effort is normal    Musculoskeletal:      Right lower leg: No edema  Left lower leg: No edema  Skin:     General: Skin is warm and dry  Coloration: Skin is not jaundiced  Findings: No rash  Neurological:      General: No focal deficit present  Mental Status: She is alert and oriented to person, place, and time  Cranial Nerves: No cranial nerve deficit  Sensory: No sensory deficit  Motor: No weakness  Gait: Gait normal    Psychiatric:         Mood and Affect: Mood normal          Behavior: Behavior normal          Thought Content: Thought content normal          Judgment: Judgment normal      Performance status is zero           Lab Results   Component Value Date    K 4 3 11/28/2022     11/28/2022    CO2 23 11/28/2022    BUN 17 11/28/2022    CREATININE 1 01 11/28/2022    GLUF 228 (H) 11/28/2022    CALCIUM 9 6 11/28/2022    CORRECTEDCA 10 6 (H) 11/28/2022    AST 16 11/28/2022    ALT 19 11/28/2022    ALKPHOS 91 11/28/2022    EGFR 57 11/28/2022     Lab Results   Component Value Date    WBC 7 84 11/28/2022    HGB 9 6 (L) 11/28/2022    HCT 33 1 (L) 11/28/2022    MCV 91 11/28/2022     11/28/2022     Lab Results   Component Value Date    NEUTROABS 5 45 11/28/2022

## 2022-12-01 NOTE — ASSESSMENT & PLAN NOTE
Progressive stage IVB high-grade endometrial cancer currently receiving palliative keytruda 200 mg IV every 21 days and lenvatinib 10 mg daily  CT imaging after cycle 3 documented disease progression  At that time, patient was on lenvatinib 4mg daily due to drug-induced hypertension  Patient has been tolerating increased lenvatinib dose  Otherwise, she is tolerating treatment well  Continue with cycle 5 of treatment as planned as long as her metabolic and hematologic parameters are adequate  Return to the office as per her chemotherapy calendar  Plan for repeat imaging after completion of cycle 6

## 2022-12-05 ENCOUNTER — APPOINTMENT (OUTPATIENT)
Dept: LAB | Facility: HOSPITAL | Age: 67
End: 2022-12-05

## 2022-12-07 RX ORDER — SODIUM CHLORIDE 9 MG/ML
20 INJECTION, SOLUTION INTRAVENOUS ONCE
Status: CANCELLED | OUTPATIENT
Start: 2022-12-08

## 2022-12-08 ENCOUNTER — HOSPITAL ENCOUNTER (OUTPATIENT)
Dept: INFUSION CENTER | Facility: HOSPITAL | Age: 67
Discharge: HOME | End: 2022-12-08
Attending: OBSTETRICS & GYNECOLOGY

## 2022-12-08 VITALS
TEMPERATURE: 97.2 F | WEIGHT: 206.57 LBS | RESPIRATION RATE: 16 BRPM | DIASTOLIC BLOOD PRESSURE: 63 MMHG | HEIGHT: 63 IN | SYSTOLIC BLOOD PRESSURE: 139 MMHG | HEART RATE: 105 BPM | OXYGEN SATURATION: 97 % | BODY MASS INDEX: 36.6 KG/M2

## 2022-12-08 DIAGNOSIS — C54.1 ENDOMETRIAL CANCER (HCC): Primary | ICD-10-CM

## 2022-12-08 RX ORDER — SODIUM CHLORIDE 9 MG/ML
20 INJECTION, SOLUTION INTRAVENOUS ONCE
Status: COMPLETED | OUTPATIENT
Start: 2022-12-08 | End: 2022-12-08

## 2022-12-08 RX ADMIN — SODIUM CHLORIDE 200 MG: 9 INJECTION, SOLUTION INTRAVENOUS at 10:18

## 2022-12-08 RX ADMIN — SODIUM CHLORIDE 20 ML/HR: 9 INJECTION, SOLUTION INTRAVENOUS at 10:18

## 2022-12-08 NOTE — PROGRESS NOTES
Pt tolerated Keytruda infusion with no adverse reaction  Pt left unit ambulatory with steady gait   Refused AVS

## 2022-12-12 ENCOUNTER — APPOINTMENT (OUTPATIENT)
Dept: LAB | Facility: HOSPITAL | Age: 67
End: 2022-12-12

## 2022-12-19 ENCOUNTER — APPOINTMENT (OUTPATIENT)
Dept: LAB | Facility: HOSPITAL | Age: 67
End: 2022-12-19

## 2022-12-21 ENCOUNTER — OFFICE VISIT (OUTPATIENT)
Dept: GYNECOLOGIC ONCOLOGY | Facility: HOSPITAL | Age: 67
End: 2022-12-21

## 2022-12-21 VITALS
WEIGHT: 206 LBS | HEART RATE: 72 BPM | BODY MASS INDEX: 36.5 KG/M2 | SYSTOLIC BLOOD PRESSURE: 128 MMHG | HEIGHT: 63 IN | DIASTOLIC BLOOD PRESSURE: 72 MMHG | OXYGEN SATURATION: 98 %

## 2022-12-21 DIAGNOSIS — C54.1 ENDOMETRIAL CANCER (HCC): Primary | ICD-10-CM

## 2022-12-21 DIAGNOSIS — T45.1X5A MOUTH SORE SECONDARY TO CHEMOTHERAPY: ICD-10-CM

## 2022-12-21 DIAGNOSIS — K13.79 MOUTH SORE SECONDARY TO CHEMOTHERAPY: ICD-10-CM

## 2022-12-21 RX ORDER — LISINOPRIL 20 MG/1
20 TABLET ORAL DAILY
COMMUNITY
Start: 2022-12-20

## 2022-12-21 NOTE — ASSESSMENT & PLAN NOTE
Progressive stage IVB high-grade endometrial cancer currently receiving palliative keytruda 200 mg IV every 21 days and lenvatinib 10 mg daily  CT imaging after cycle 3 documented disease progression  At that time, patient was on lenvatinib 4mg daily due to drug-induced hypertension  Patient has been tolerating increased lenvatinib dose  She has a single mouth sore  She has treatment related fatigue which is stable  Otherwise, she is tolerating treatment well       Continue with cycle 6 of treatment as planned as long as her metabolic and hematologic parameters are adequate       Plan for repeat imaging after completion of cycle 6  Return to the office as per her chemotherapy calendar

## 2022-12-21 NOTE — PROGRESS NOTES
Assessment/Plan:    Problem List Items Addressed This Visit        Genitourinary    Endometrial cancer ([facility] ) - Primary     Progressive stage IVB high-grade endometrial cancer currently receiving palliative keytruda 200 mg IV every 21 days and lenvatinib 10 mg daily  CT imaging after cycle 3 documented disease progression  At that time, patient was on lenvatinib 4mg daily due to drug-induced hypertension  Patient has been tolerating increased lenvatinib dose  She has a single mouth sore  She has treatment related fatigue which is stable  Otherwise, she is tolerating treatment well       Continue with cycle 6 of treatment as planned as long as her metabolic and hematologic parameters are adequate       Plan for repeat imaging after completion of cycle 6  Return to the office as per her chemotherapy calendar  Relevant Orders    CT chest abdomen pelvis w contrast   Other Visit Diagnoses     Mouth sore secondary to chemotherapy        Relevant Medications    al mag oxide-diphenhydramine-lidocaine viscous (MAGIC MOUTHWASH) 1:1:1 suspension            CHIEF COMPLAINT:   Pre-chemotherapy evaluation    Problem:  Cancer Staging   Endometrial cancer [facility])  Staging form: Corpus Uteri - Carcinoma, AJCC 8th Edition  - Clinical: FIGO Stage IVB (cM1) - Signed by Dung Redman MD on 2/17/2022        Previous therapy:  Oncology History   Endometrial cancer ([facility] )   1/26/2022 Initial Diagnosis    [facility]     2/17/2022 -  Cancer Staged    Staging form: Corpus Uteri - Carcinoma, AJCC 8th Edition  - Clinical: FIGO Stage IVB (cM1) - Signed by Dung Redman MD on 2/17/2022  Histologic grade (G): G3  Histologic grading system: 3 grade system       2/24/2022 -  Chemotherapy    Taxol 175 mg/m2 and carboplatin AUC 6 every 21 days  Taxol dose-reduced to 135 mg/m2 with cycle 2 due to arthralgias/myalgias  She has received 4 cycles in the neoadjuvant setting, and 2 following aborting debulking  Carboplatin dose-reduced to AUC 5 with cycle 5 d/t thrombocytopenia  3/11/2022 Genomic Testing    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, ER/KS positive, microsatellite stable  No other targeted therapy opportunities  5/31/2022 Surgery    Robotic-assisted BSO and lysis of adhesions  Hysterectomy aborted due to parametrial/cervical involvement and adhesive disease  9/15/2022 -  Chemotherapy    Keytruda 200 mg IV every 21 days with oral lenvatinib 10 mg daily (previously increased from 4 mg)  She is scheduled for cycle 6  Patient ID: Abby Brunner is a 75 y o  female  who presents to the office for pre-chemotherapy evaluation  Overall, she continues to tolerate treatment well  She has been afebrile  She notes intermittent fatigue which is stable  She denies n/v/abdominal pain  No diarrhea or bloody stool  Appetite is appropriate  Blood pressures are well controlled  She denies skin rash or new/worsening SOB/cough  The patient notes a small sore on the roof of her mouth  This is triggered by certain foods  CBC/Diff, CMP, Mg from 12/19/22 reviewed  The following portions of the patient's history were reviewed and updated as appropriate: allergies, current medications, past medical history, past surgical history and problem list     Review of Systems   Constitutional: Positive for fatigue  Negative for fever  HENT: Positive for mouth sores  Eyes: Negative  Respiratory: Negative  Cardiovascular: Negative  Gastrointestinal: Negative  Genitourinary: Negative  Musculoskeletal: Negative  Skin: Negative  Neurological: Negative  Psychiatric/Behavioral: Negative  Current Outpatient Medications   Medication Sig Dispense Refill   • acetaminophen (TYLENOL) 650 mg CR tablet Take 1,300 mg by mouth in the morning and 1,300 mg before bedtime       • amLODIPine (NORVASC) 10 mg tablet      • BD Pen Needle Yoana 2nd Gen 32G X 4 MM MISC 2 (two) times a day     • Contour Next Test test strip 2 (two) times a day Test     • fexofenadine (ALLEGRA) 180 MG tablet every 24 hours     • furosemide (LASIX) 40 mg tablet Take 1 tablet by mouth daily     • gabapentin (NEURONTIN) 300 mg capsule Take 300 mg by mouth 3 (three) times a day     • Lenvatinib, 10 MG Daily Dose, (Lenvima, 10 MG Daily Dose,) 10 MG CPPK Take 10 mg by mouth in the morning 30 each 1   • lidocaine-prilocaine (EMLA) cream Apply to port site 30 min prior to labs/chemo 30 g 2   • lisinopril (ZESTRIL) 20 mg tablet Take 20 mg by mouth daily     • lisinopril (ZESTRIL) 40 mg tablet Take 1 tablet (40 mg total) by mouth daily 30 tablet 0   • LORazepam (ATIVAN) 0 5 mg tablet Take 0 5 mg by mouth in the morning and 0 5 mg in the evening  • magnesium oxide (MAG-OX) 400 mg Take 400 mg by mouth in the morning  • metFORMIN (GLUCOPHAGE) 1000 MG tablet Take 1 tablet by mouth 2 (two) times a day with meals     • Microlet Lancets MISC 2 (two) times a day Test     • omeprazole (PriLOSEC) 20 mg delayed release capsule every 24 hours     • ondansetron (ZOFRAN) 8 mg tablet Take 1 tablet (8 mg total) by mouth every 8 (eight) hours as needed for nausea or vomiting 20 tablet 1   • Rosuvastatin Calcium 20 MG CPSP Take 1 tablet by mouth daily     • sertraline (ZOLOFT) 50 mg tablet Take 50 mg by mouth daily     • sitaGLIPtin (JANUVIA) 100 mg tablet Take 1 tablet by mouth daily     • Toujeo SoloStar 300 units/mL CONCENTRATED U-300 injection pen (1-unit dial) every 12 (twelve) hours 34 units in the am and 36 units at night       No current facility-administered medications for this visit  Objective:    Blood pressure 128/72, pulse 72, height 5' 3 23" (1 606 m), weight 93 4 kg (206 lb), SpO2 98 %  Body mass index is 36 23 kg/m²  Body surface area is 1 96 meters squared  Physical Exam  Vitals reviewed  Constitutional:       General: She is not in acute distress  Appearance: Normal appearance  She is not ill-appearing     HENT: Head: Normocephalic and atraumatic  Mouth/Throat:      Mouth: Mucous membranes are moist    Eyes:      General: No scleral icterus  Right eye: No discharge  Left eye: No discharge  Conjunctiva/sclera: Conjunctivae normal    Pulmonary:      Effort: Pulmonary effort is normal    Musculoskeletal:      Right lower leg: No edema  Left lower leg: No edema  Skin:     General: Skin is warm and dry  Coloration: Skin is not jaundiced  Findings: No rash  Neurological:      General: No focal deficit present  Mental Status: She is alert and oriented to person, place, and time  Cranial Nerves: No cranial nerve deficit  Sensory: No sensory deficit  Motor: No weakness  Gait: Gait normal    Psychiatric:         Mood and Affect: Mood normal          Behavior: Behavior normal          Thought Content: Thought content normal          Judgment: Judgment normal      Performance status is zero         Lab Results   Component Value Date     3 3 12/05/2022     Lab Results   Component Value Date    K 4 1 12/19/2022     12/19/2022    CO2 23 12/19/2022    BUN 20 12/19/2022    CREATININE 1 10 12/19/2022    GLUF 228 (H) 11/28/2022    CALCIUM 9 3 12/19/2022    CORRECTEDCA 10 1 12/19/2022    AST 21 12/19/2022    ALT 22 12/19/2022    ALKPHOS 90 12/19/2022    EGFR 51 12/19/2022     Lab Results   Component Value Date    WBC 7 37 12/19/2022    HGB 10 0 (L) 12/19/2022    HCT 34 5 (L) 12/19/2022    MCV 92 12/19/2022     12/19/2022     Lab Results   Component Value Date    NEUTROABS 4 85 12/19/2022        Trend:  Lab Results   Component Value Date     3 3 12/05/2022     6 0 11/14/2022     4 8 10/24/2022     7 9 10/03/2022     7 3 09/12/2022     12 6 08/08/2022     14 3 07/11/2022     12 2 05/10/2022     13 6 04/25/2022     10 9 04/04/2022     11 4 03/15/2022     10 9 02/28/2022

## 2022-12-27 ENCOUNTER — APPOINTMENT (OUTPATIENT)
Dept: LAB | Facility: HOSPITAL | Age: 67
End: 2022-12-27

## 2022-12-28 RX ORDER — SODIUM CHLORIDE 9 MG/ML
20 INJECTION, SOLUTION INTRAVENOUS ONCE
Status: CANCELLED | OUTPATIENT
Start: 2022-12-29

## 2022-12-29 ENCOUNTER — HOSPITAL ENCOUNTER (OUTPATIENT)
Dept: INFUSION CENTER | Facility: HOSPITAL | Age: 67
Discharge: HOME | End: 2022-12-29
Attending: OBSTETRICS & GYNECOLOGY

## 2022-12-29 VITALS
HEART RATE: 101 BPM | DIASTOLIC BLOOD PRESSURE: 61 MMHG | WEIGHT: 205.47 LBS | BODY MASS INDEX: 36.13 KG/M2 | SYSTOLIC BLOOD PRESSURE: 131 MMHG | TEMPERATURE: 97.1 F | RESPIRATION RATE: 18 BRPM

## 2022-12-29 DIAGNOSIS — C54.1 ENDOMETRIAL CANCER (HCC): Primary | ICD-10-CM

## 2022-12-29 RX ORDER — SODIUM CHLORIDE 9 MG/ML
20 INJECTION, SOLUTION INTRAVENOUS ONCE
Status: COMPLETED | OUTPATIENT
Start: 2022-12-29 | End: 2022-12-29

## 2022-12-29 RX ADMIN — SODIUM CHLORIDE 20 ML/HR: 9 INJECTION, SOLUTION INTRAVENOUS at 12:23

## 2022-12-29 RX ADMIN — SODIUM CHLORIDE 200 MG: 9 INJECTION, SOLUTION INTRAVENOUS at 12:23

## 2023-01-03 ENCOUNTER — APPOINTMENT (OUTPATIENT)
Dept: LAB | Facility: HOSPITAL | Age: 68
End: 2023-01-03

## 2023-01-09 ENCOUNTER — APPOINTMENT (OUTPATIENT)
Dept: LAB | Facility: HOSPITAL | Age: 68
End: 2023-01-09

## 2023-01-10 ENCOUNTER — HOSPITAL ENCOUNTER (OUTPATIENT)
Dept: CT IMAGING | Facility: HOSPITAL | Age: 68
Discharge: HOME | End: 2023-01-10

## 2023-01-10 DIAGNOSIS — C54.1 ENDOMETRIAL CANCER (HCC): ICD-10-CM

## 2023-01-10 RX ADMIN — IOHEXOL 100 ML: 350 INJECTION, SOLUTION INTRAVENOUS at 14:18

## 2023-01-12 ENCOUNTER — OFFICE VISIT (OUTPATIENT)
Dept: GYNECOLOGIC ONCOLOGY | Facility: HOSPITAL | Age: 68
End: 2023-01-12

## 2023-01-12 VITALS
HEIGHT: 63 IN | HEART RATE: 104 BPM | BODY MASS INDEX: 35.97 KG/M2 | DIASTOLIC BLOOD PRESSURE: 68 MMHG | SYSTOLIC BLOOD PRESSURE: 134 MMHG | OXYGEN SATURATION: 98 % | WEIGHT: 203 LBS

## 2023-01-12 DIAGNOSIS — C54.1 ENDOMETRIAL CANCER (HCC): Primary | ICD-10-CM

## 2023-01-12 NOTE — ASSESSMENT & PLAN NOTE
74-year-old with progressive stage IVb grade 3 endometrial cancer, liver, lung, retroperitoneal lymph node metastases with mixed response, improvement in retroperitoneal lymphadenopathy with increased disease in the uterus, new 4 mm right lower lobe nodule  She is currently on palliative Keytruda 200 mg IV every 3 weeks and lenvatinib at 10 mg orally daily  She is tolerating treatment very well  Quality of life is good  Reviewed her CBC, CMP, CT chest abdomen and pelvis images  Her performance status is 1   1   Given stable disease in the liver, decreasing disease and retroperitoneal lymphadenopathy but increasing disease in the uterus, I recommended referral to radiation oncology to consider pelvic radiotherapy to address the potentially treatment distant disease in the uterus that was not surgically resectable  2   Continue Keytruda at 200 mg IV every 3 weeks with lenvatinib 10 mg orally daily for 3 additional cycles followed by repeat CT imaging to assess disease response  Her hematologic and metabolic parameters are adequate for treatment  Blood pressure is well controlled

## 2023-01-12 NOTE — PROGRESS NOTES
Assessment/Plan:    Problem List Items Addressed This Visit        Genitourinary    Endometrial cancer ([facility] ) - Primary     59-year-old with progressive stage IVb grade 3 endometrial cancer, liver, lung, retroperitoneal lymph node metastases with mixed response, improvement in retroperitoneal lymphadenopathy with increased disease in the uterus, new 4 mm right lower lobe nodule  She is currently on palliative Keytruda 200 mg IV every 3 weeks and lenvatinib at 10 mg orally daily  She is tolerating treatment very well  Quality of life is good  Reviewed her CBC, CMP, CT chest abdomen and pelvis images  Her performance status is 1   1   Given stable disease in the liver, decreasing disease and retroperitoneal lymphadenopathy but increasing disease in the uterus, I recommended referral to radiation oncology to consider pelvic radiotherapy to address the potentially treatment distant disease in the uterus that was not surgically resectable  2   Continue Keytruda at 200 mg IV every 3 weeks with lenvatinib 10 mg orally daily for 3 additional cycles followed by repeat CT imaging to assess disease response  Her hematologic and metabolic parameters are adequate for treatment  Blood pressure is well controlled           Relevant Orders    Ambulatory referral to Radiation Oncology         CHIEF COMPLAINT: Pretreatment evaluation, review imaging      Problem:  Cancer Staging   Endometrial cancer [facility])  Staging form: Corpus Uteri - Carcinoma, AJCC 8th Edition  - Clinical: FIGO Stage IVB (cM1) - Signed by Patrick Browning MD on 2/17/2022        Previous therapy:  Oncology History   Endometrial cancer ([facility] )   1/26/2022 Initial Diagnosis    Endometrial cancer ([place] [address] )     2/17/2022 -  Cancer Staged    Staging form: Corpus Uteri - Carcinoma, AJCC 8th Edition  - Clinical: FIGO Stage IVB (cM1) - Signed by Patrick Browning MD on 2/17/2022  Histologic grade (G): G3  Histologic grading system: 3 grade system 2/24/2022 -  Chemotherapy    Taxol 175 mg/m2 and carboplatin AUC 6 every 21 days  Taxol dose-reduced to 135 mg/m2 with cycle 2 due to arthralgias/myalgias  She has received 4 cycles in the neoadjuvant setting, and 2 following aborting debulking  Carboplatin dose-reduced to AUC 5 with cycle 5 d/t thrombocytopenia  3/11/2022 Genomic Testing    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, ER/CO positive, microsatellite stable  No other targeted therapy opportunities  5/31/2022 Surgery    Robotic-assisted BSO and lysis of adhesions  Hysterectomy aborted due to parametrial/cervical involvement and adhesive disease  9/15/2022 -  Chemotherapy    Keytruda 200 mg IV every 21 days with oral lenvatinib 10 mg daily (previously increased from 4 mg)  She is scheduled for cycle 7  Patient ID: mEily Ling is a 75 y o  female  Who returns prior to cycle #7 of palliative pembrolizumab and lenvatinib  Current lenvatinib dose is 10 mg  Blood pressure has been well controlled  She had 1 mouth sore which has resolved  She remains relatively active  She participated in [employer]  Her quality of life has been good  Labs from January 9, 2023 revealed a normal CMP, hemoglobin 10 8 g/dL, normal total white blood cell count  CT of chest abdomen pelvis from 1/11/2023 demonstrated a new right lower lobe nodule measuring 4 mm  There was decreasing retroperitoneal lymphadenopathy but an increasing uterine mass  She has occasional vaginal spotting  She does not have pelvic pain  Her blood pressure has been improved  No other interval change in medications or medical history since her last visit to the office        The following portions of the patient's history were reviewed and updated as appropriate: allergies, current medications, past family history, past medical history, past social history, past surgical history and problem list     Review of Systems   Constitutional: Negative for activity change and unexpected weight change  HENT: Positive for mouth sores  Eyes: Negative  Respiratory: Negative  Cardiovascular: Negative  Gastrointestinal: Negative for abdominal distention and abdominal pain  Endocrine: Negative  Genitourinary: Positive for vaginal bleeding  Negative for pelvic pain  Musculoskeletal: Negative  Skin: Negative  Allergic/Immunologic: Negative  Neurological: Negative  Hematological: Negative  Psychiatric/Behavioral: Negative  Current Outpatient Medications   Medication Sig Dispense Refill   • acetaminophen (TYLENOL) 650 mg CR tablet Take 1,300 mg by mouth in the morning and 1,300 mg before bedtime  • al mag oxide-diphenhydramine-lidocaine viscous (MAGIC MOUTHWASH) 1:1:1 suspension Swish and spit 10 mL every 4 (four) hours as needed for mouth pain or discomfort 90 mL 0   • amLODIPine (NORVASC) 10 mg tablet      • BD Pen Needle Yoana 2nd Gen 32G X 4 MM MISC 2 (two) times a day     • Contour Next Test test strip 2 (two) times a day Test     • fexofenadine (ALLEGRA) 180 MG tablet every 24 hours     • furosemide (LASIX) 40 mg tablet Take 1 tablet by mouth daily     • gabapentin (NEURONTIN) 300 mg capsule Take 300 mg by mouth 3 (three) times a day     • lidocaine-prilocaine (EMLA) cream Apply to port site 30 min prior to labs/chemo 30 g 2   • lisinopril (ZESTRIL) 20 mg tablet Take 20 mg by mouth daily     • lisinopril (ZESTRIL) 40 mg tablet Take 1 tablet (40 mg total) by mouth daily 30 tablet 0   • LORazepam (ATIVAN) 0 5 mg tablet Take 0 5 mg by mouth in the morning and 0 5 mg in the evening  • magnesium oxide (MAG-OX) 400 mg Take 400 mg by mouth in the morning       • metFORMIN (GLUCOPHAGE) 1000 MG tablet Take 1 tablet by mouth 2 (two) times a day with meals     • Microlet Lancets MISC 2 (two) times a day Test     • omeprazole (PriLOSEC) 20 mg delayed release capsule every 24 hours     • ondansetron (ZOFRAN) 8 mg tablet Take 1 tablet (8 mg total) by mouth every 8 (eight) hours as needed for nausea or vomiting 20 tablet 1   • Rosuvastatin Calcium 20 MG CPSP Take 1 tablet by mouth daily     • sertraline (ZOLOFT) 50 mg tablet Take 50 mg by mouth daily     • sitaGLIPtin (JANUVIA) 100 mg tablet Take 1 tablet by mouth daily     • Toujeo SoloStar 300 units/mL CONCENTRATED U-300 injection pen (1-unit dial) every 12 (twelve) hours 34 units in the am and 36 units at night     • Lenvatinib, 10 MG Daily Dose, (Lenvima, 10 MG Daily Dose,) 10 MG CPPK Take 10 mg by mouth in the morning 30 each 1     No current facility-administered medications for this visit  Objective:    Blood pressure 134/68, pulse 104, height 5' 3 23" (1 606 m), weight 92 1 kg (203 lb), SpO2 98 %  Body mass index is 35 7 kg/m²  Body surface area is 1 95 meters squared  Physical Exam  Vitals reviewed  Constitutional:       General: She is not in acute distress  Appearance: Normal appearance  She is obese  She is not ill-appearing or toxic-appearing  HENT:      Head: Normocephalic and atraumatic  Mouth/Throat:      Mouth: Mucous membranes are moist    Eyes:      General: No scleral icterus  Right eye: No discharge  Left eye: No discharge  Conjunctiva/sclera: Conjunctivae normal    Pulmonary:      Effort: Pulmonary effort is normal    Skin:     General: Skin is warm and dry  Coloration: Skin is not jaundiced  Findings: No rash  Neurological:      General: No focal deficit present  Mental Status: She is alert and oriented to person, place, and time  Cranial Nerves: No cranial nerve deficit  Motor: No weakness  Gait: Gait normal    Psychiatric:         Mood and Affect: Mood normal          Behavior: Behavior normal          Thought Content:  Thought content normal          Judgment: Judgment normal          Lab Results   Component Value Date     4 3 12/27/2022     Lab Results   Component Value Date    K 3 7 01/09/2023     01/09/2023    CO2 27 01/09/2023    BUN 15 01/09/2023    CREATININE 1 14 01/09/2023    GLUF 228 (H) 11/28/2022    CALCIUM 9 5 01/09/2023    CORRECTEDCA 10 1 01/09/2023    AST 23 01/09/2023    ALT 24 01/09/2023    ALKPHOS 91 01/09/2023    EGFR 49 01/09/2023     Lab Results   Component Value Date    WBC 6 92 01/09/2023    HGB 10 8 (L) 01/09/2023    HCT 36 6 01/09/2023    MCV 90 01/09/2023     01/09/2023     Lab Results   Component Value Date    NEUTROABS 4 30 01/09/2023        Trend:  Lab Results   Component Value Date     4 3 12/27/2022     3 3 12/05/2022     6 0 11/14/2022     4 8 10/24/2022     7 9 10/03/2022     7 3 09/12/2022     12 6 08/08/2022     14 3 07/11/2022     12 2 05/10/2022     13 6 04/25/2022     10 9 04/04/2022     11 4 03/15/2022     10 9 02/28/2022

## 2023-01-16 ENCOUNTER — APPOINTMENT (OUTPATIENT)
Dept: LAB | Facility: HOSPITAL | Age: 68
End: 2023-01-16

## 2023-01-16 LAB
ALBUMIN SERPL BCP-MCNC: 3.1 G/DL (ref 3.5–5)
ALP SERPL-CCNC: 84 U/L (ref 46–116)
ALT SERPL W P-5'-P-CCNC: 19 U/L (ref 12–78)
AMYLASE SERPL-CCNC: 43 IU/L (ref 25–115)
ANION GAP SERPL CALCULATED.3IONS-SCNC: 10 MMOL/L (ref 4–13)
AST SERPL W P-5'-P-CCNC: 19 U/L (ref 5–45)
BASOPHILS # BLD AUTO: 0.02 THOUSANDS/ÂΜL (ref 0–0.1)
BASOPHILS NFR BLD AUTO: 0 % (ref 0–1)
BILIRUB SERPL-MCNC: 0.42 MG/DL (ref 0.2–1)
BUN SERPL-MCNC: 18 MG/DL (ref 5–25)
CALCIUM ALBUM COR SERPL-MCNC: 10.1 MG/DL (ref 8.3–10.1)
CALCIUM SERPL-MCNC: 9.4 MG/DL (ref 8.3–10.1)
CHLORIDE SERPL-SCNC: 104 MMOL/L (ref 96–108)
CO2 SERPL-SCNC: 25 MMOL/L (ref 21–32)
CREAT SERPL-MCNC: 1.07 MG/DL (ref 0.6–1.3)
CREAT UR-MCNC: 199 MG/DL
EOSINOPHIL # BLD AUTO: 0.22 THOUSAND/ÂΜL (ref 0–0.61)
EOSINOPHIL NFR BLD AUTO: 3 % (ref 0–6)
ERYTHROCYTE [DISTWIDTH] IN BLOOD BY AUTOMATED COUNT: 18 % (ref 11.6–15.1)
GFR SERPL CREATININE-BSD FRML MDRD: 53 ML/MIN/1.73SQ M
GLUCOSE SERPL-MCNC: 224 MG/DL (ref 65–140)
HCT VFR BLD AUTO: 33.6 % (ref 34.8–46.1)
HGB BLD-MCNC: 9.8 G/DL (ref 11.5–15.4)
IMM GRANULOCYTES # BLD AUTO: 0.04 THOUSAND/UL (ref 0–0.2)
IMM GRANULOCYTES NFR BLD AUTO: 1 % (ref 0–2)
LIPASE SERPL-CCNC: 97 U/L (ref 73–393)
LYMPHOCYTES # BLD AUTO: 1.23 THOUSANDS/ÂΜL (ref 0.6–4.47)
LYMPHOCYTES NFR BLD AUTO: 19 % (ref 14–44)
MAGNESIUM SERPL-MCNC: 1.5 MG/DL (ref 1.6–2.6)
MCH RBC QN AUTO: 26.6 PG (ref 26.8–34.3)
MCHC RBC AUTO-ENTMCNC: 29.2 G/DL (ref 31.4–37.4)
MCV RBC AUTO: 91 FL (ref 82–98)
MONOCYTES # BLD AUTO: 0.86 THOUSAND/ÂΜL (ref 0.17–1.22)
MONOCYTES NFR BLD AUTO: 13 % (ref 4–12)
NEUTROPHILS # BLD AUTO: 4.23 THOUSANDS/ÂΜL (ref 1.85–7.62)
NEUTS SEG NFR BLD AUTO: 64 % (ref 43–75)
NRBC BLD AUTO-RTO: 0 /100 WBCS
PLATELET # BLD AUTO: 162 THOUSANDS/UL (ref 149–390)
PMV BLD AUTO: 10.2 FL (ref 8.9–12.7)
POTASSIUM SERPL-SCNC: 3.6 MMOL/L (ref 3.5–5.3)
PROT SERPL-MCNC: 6.3 G/DL (ref 6.4–8.4)
PROT UR-MCNC: 60 MG/DL
PROT/CREAT UR: 0.3 MG/G{CREAT} (ref 0–0.1)
RBC # BLD AUTO: 3.69 MILLION/UL (ref 3.81–5.12)
SODIUM SERPL-SCNC: 139 MMOL/L (ref 135–147)
TSH SERPL DL<=0.05 MIU/L-ACNC: 3.29 UIU/ML (ref 0.45–4.5)
WBC # BLD AUTO: 6.6 THOUSAND/UL (ref 4.31–10.16)

## 2023-01-17 LAB — CANCER AG125 SERPL-ACNC: 4.1 U/ML (ref 0–30)

## 2023-01-18 RX ORDER — SODIUM CHLORIDE 9 MG/ML
20 INJECTION, SOLUTION INTRAVENOUS ONCE
Status: CANCELLED | OUTPATIENT
Start: 2023-01-19

## 2023-01-19 ENCOUNTER — HOSPITAL ENCOUNTER (OUTPATIENT)
Dept: INFUSION CENTER | Facility: HOSPITAL | Age: 68
Discharge: HOME | End: 2023-01-19
Attending: OBSTETRICS & GYNECOLOGY

## 2023-01-19 VITALS
SYSTOLIC BLOOD PRESSURE: 132 MMHG | BODY MASS INDEX: 35.94 KG/M2 | RESPIRATION RATE: 18 BRPM | WEIGHT: 202.82 LBS | TEMPERATURE: 98.6 F | DIASTOLIC BLOOD PRESSURE: 66 MMHG | HEART RATE: 101 BPM | OXYGEN SATURATION: 96 % | HEIGHT: 63 IN

## 2023-01-19 DIAGNOSIS — C54.1 ENDOMETRIAL CANCER (HCC): Primary | ICD-10-CM

## 2023-01-19 RX ORDER — SODIUM CHLORIDE 9 MG/ML
20 INJECTION, SOLUTION INTRAVENOUS ONCE
Status: COMPLETED | OUTPATIENT
Start: 2023-01-19 | End: 2023-01-19

## 2023-01-19 RX ADMIN — SODIUM CHLORIDE 200 MG: 9 INJECTION, SOLUTION INTRAVENOUS at 10:18

## 2023-01-19 RX ADMIN — SODIUM CHLORIDE 20 ML/HR: 9 INJECTION, SOLUTION INTRAVENOUS at 10:18

## 2023-01-19 NOTE — PROGRESS NOTES
Pt here for chemo tolerated well no adverse reactions declined AVS and left ambulatory with steady gait

## 2023-01-20 ENCOUNTER — RADIATION ONCOLOGY CONSULT (OUTPATIENT)
Dept: RADIATION ONCOLOGY | Facility: HOSPITAL | Age: 68
End: 2023-01-20

## 2023-01-20 ENCOUNTER — CLINICAL SUPPORT (OUTPATIENT)
Dept: RADIATION ONCOLOGY | Facility: HOSPITAL | Age: 68
End: 2023-01-20
Attending: STUDENT IN AN ORGANIZED HEALTH CARE EDUCATION/TRAINING PROGRAM

## 2023-01-20 VITALS
HEART RATE: 97 BPM | DIASTOLIC BLOOD PRESSURE: 80 MMHG | SYSTOLIC BLOOD PRESSURE: 127 MMHG | RESPIRATION RATE: 18 BRPM | BODY MASS INDEX: 35.79 KG/M2 | OXYGEN SATURATION: 99 % | WEIGHT: 202 LBS | HEIGHT: 63 IN | TEMPERATURE: 97.5 F

## 2023-01-20 DIAGNOSIS — C54.1 ENDOMETRIAL CANCER (HCC): Primary | ICD-10-CM

## 2023-01-20 DIAGNOSIS — C54.1 ENDOMETRIAL CANCER (HCC): ICD-10-CM

## 2023-01-20 NOTE — PROGRESS NOTES
[facility] 1955 is a 75 y o  female Radiation Oncology consult for endometrial cancer, referred by Dr Kaushal Franco to discuss pelvic radiation for unresectable disease  75year old female with stage IV B high-grade endometrial cancer, liver, retroperitoneal lymph node and lung metastatic disease  Initial diagnosis January 2022 when she presented with 1-2 months of postmenopausal bleeding  She had a pelvic ultrasound at [facility] in Dec 2021 that showed endometrial thickening, as well as a lobulated mass at the cervix measuring 2 4 cm  She proceeded with cervical polypectomy and endometrial biopsy at [facility] on 1/4/2022 that revealed high-grade endometrial cancer  CT c/a/p in Feb 2022 revealed extensive pelvic disease, lymph node, liver and lung metastases  Surgery was canceled and she proceeded with systemic chemotherapy with carboplatin/taxol  After 4 cycles of chemotherapy, hysterectomy was attempted and aborted given extensive parametrial disease  She continued with Carbo/Taxol and Avastin  CT scan in Aug 2022 showed disease progression in the right external iliac lymph nodes with stable disease elsewhere  Carbo/Taxol/Avastin was discontinued at that time and Immunotherapy, IV Keytruda q 3 weeks and oral lenvatinib was initiated in September 2022  Recent CT in Jan 2023 shows increasing disease in uterus  She is referred for pelvic RT and continue current systemic treatment with Keytruda and Lenvatinib  1/4/22 Biopsies at [address] [place] [address]  Cervical polyp biopsy:   High grade endometrial carcinoma   B  Endometrium biopsy:  High grade endometrial carcinoma      2/7/22 CT chest abd pelvis  Large mass compatible patient's known endometrial cancer filling the endometrial cavity with evidence of myometrial and possible serosal invasion  Mediastinal and retroperitoneal adenopathy consistent with mariana metastatic disease     Extensive subcentimeter hypodensities throughout the liver concerning for hepatic metastasis  Several scattered pulmonary nodules measuring up to 5 mm which may represent pulmonary metastasis  5/31/22 Robotic assisted BSO and lysis of adhesions  (Hysterectomy aborted due to parametrial/cervical involvement and adhesive disease)  A  Fallopian tube, left, salpingectomy:  -  Complete cross-section of benign fallopian tube with attached benign fimbria showing focal benign paratubal cyst, negative for atypia or malignancy  B  Fallopian tube and ovary, right, salpingo-oophorectomy:  -  Benign ovarian parenchyma with benign corpora lutea and cystic follicles, negative for atypia or malignancy  -  Complete cross-section of benign fallopian tube with attached benign fimbria showing focal benign paratubal cysts, negative for atypia or malignancy      1/10/23 CT chest abd pelvis   1  The heterogeneous mass occupying the uterus appears larger  2   There is a new nonspecific 4 mm subpleural right lower lobe pulmonary nodule  3   Stable appearing mediastinal and left supraclavicular adenopathy  4   Stable appearing numerous hepatic metastases  5   Persistent retroperitoneal and pelvic sidewall adenopathy  Of note, one of the left retroperitoneal lymph nodes appears smaller  1/12/23 Dr Alberto Goode, [facility]  CT shows mixed response, improvement in retroperitoneal lymphadenopathy with increased disease in the uterus, new 4 mm right lower lobe nodule  She is currently on palliative Keytruda 200 mg IV every 3 weeks and lenvatinib at 10 mg orally daily  She is tolerating treatment very well  Quality of life is good  PLAN: Given stable disease in the liver, decreasing disease and retroperitoneal lymphadenopathy but increasing disease in the uterus, recommended referral to radiation oncology to consider pelvic radiotherapy to address the potentially treatment distant disease in the uterus that was not surgically resectable    Continue Keytruda at 200 mg IV every 3 weeks with lenvatinib 10 mg orally daily for 3 additional cycles followed by repeat CT imaging to assess disease response  [email or web address] Isabel Price PA-C        Oncology History   Endometrial cancer [facility])   2022 Initial Diagnosis    Endometrial cancer ([facility] )     2022 Biopsy    [facility]    A  Cervical polyp biopsy:   High grade endometrial carcinoma    B  Endometrium biopsy:  High grade endometrial carcinoma     2022 -  Cancer Staged    Staging form: Corpus Uteri - Carcinoma, AJCC 8th Edition  - Clinical: FIGO Stage IVB (cM1) - Signed by Jess Simpson MD on 2022  Histologic grade (G): G3  Histologic grading system: 3 grade system       2022 -  Chemotherapy    Taxol 175 mg/m2 and carboplatin AUC 6 every 21 days  Taxol dose-reduced to 135 mg/m2 with cycle 2 due to arthralgias/myalgias  She has received 4 cycles in the neoadjuvant setting, and 2 following aborting debulking  Carboplatin dose-reduced to AUC 5 with cycle 5 d/t thrombocytopenia  3/11/2022 Genomic Testing    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, ER/PA positive, microsatellite stable  No other targeted therapy opportunities  2022 Surgery    Robotic-assisted BSO and lysis of adhesions  Hysterectomy aborted due to parametrial/cervical involvement and adhesive disease  9/15/2022 -  Chemotherapy    Keytruda 200 mg IV every 21 days with oral lenvatinib 10 mg daily (previously increased from 4 mg)  She is scheduled for cycle 7  Review of Systems:  Review of Systems   Constitutional: Positive for fatigue (d/t immunotherapy) and unexpected weight change (wt loss )  HENT: Positive for mouth sores (roof of mouth sore uses MMW ), postnasal drip, rhinorrhea, sinus pressure and sneezing  Eyes:        Wears glasses , cataract surgery    Respiratory: Negative  Cardiovascular: Positive for leg swelling (left >right )  Gastrointestinal: Positive for nausea   Negative for abdominal distention, abdominal pain, constipation and diarrhea  Endocrine: Positive for cold intolerance  Genitourinary: Positive for vaginal bleeding (" spotting" )  Negative for difficulty urinating and dysuria  Bladder control issues    Musculoskeletal: Positive for arthralgias, back pain and joint swelling  Skin: Negative  Allergic/Immunologic: Negative  Neurological: Negative  Hematological: Negative  Psychiatric/Behavioral: Negative for self-injury and suicidal ideas  The patient is not nervous/anxious  Clinical Trial: no    OB/GYN History:  The patient underwent menarche at 10yrs  Menopause Status   No LMP recorded  Patient has had a hysterectomy  oophorectomy   Menopause at 49} years  Menopause Reason age/natureal   Hormone replacement therapy: no      1   Para 1   Age at first delivery being 28 years  Nursing: no    Birth control pills: yes    Years used not sure     Pregnancy test needed:  no          Prior Radiation no    Teaching completed with [facility] radiation and nutrition booklet, community support calender and virtual dietary sheet plus onco link sheet on fatigue, skin, pelvic RT      MST completeted    Implantable Devices (Port, Pacemaker, pain stimulator) PORT RT CHEST     Hip Replacement NO         Health Maintenance   Topic Date Due   • Hepatitis C Screening  Never done   • Kidney Health Evaluation: Microalbumin/Creatinine Ratio  Never done   • COVID-19 Vaccine (1) Never done   • Diabetic Foot Exam  Never done   • DM Eye Exam  Never done   • Depression Screening  Never done   • BMI: Followup Plan  Never done   • Annual Physical  Never done   • DTaP,Tdap,and Td Vaccines (1 - Tdap) Never done   • Breast Cancer Screening: Mammogram  Never done   • Colorectal Cancer Screening  Never done   • Osteoporosis Screening  Never done   • Pneumococcal Vaccine: 65+ Years (2 - PCV) 2016   • Fall Risk  Never done   • Urinary Incontinence Screening  Never done   • Influenza Vaccine (1) 09/01/2022   • HEMOGLOBIN A1C  11/11/2022   • Endometrial Survivorship Visit  Never done   • Weight Management Consult  Never done   • ONC Mammogram  Never done   • Colorectal Surgery Screening  12/26/2022   • Onc DXA Scan  01/09/2023   • Kidney Health Evaluation: GFR  01/16/2024   • BMI: Adult  01/19/2024   • HIB Vaccine  Aged Out   • IPV Vaccine  Aged Out   • Hepatitis A Vaccine  Aged Out   • Meningococcal ACWY Vaccine  Aged Out   • HPV Vaccine  Aged Out     Past Medical History:   Diagnosis Date   • Anxiety    • Arthritis    • Back pain    • Cancer ([facility] )    • Depression    • Diabetes mellitus ([place] [address] )    • Disease of thyroid gland    • Endometrial cancer ([place] [address] )    • GERD (gastroesophageal reflux disease)    • Hyperlipidemia associated with type 2 diabetes mellitus ([place] [address] )    • Hypertension    • Morbid obesity with BMI of 40 0-44 9, adult ([place] [address] )    • Type 2 diabetes mellitus without complication ([place] [address] )    • Urinary incontinence    • Wears glasses      Past Surgical History:   Procedure Laterality Date   • ABDOMINAL ADHESION SURGERY N/A 5/31/2022    Procedure: LYSIS ADHESIONS LAPAROSCOPIC W ROBOT;  Surgeon: Aurora Mesa MD;  Location: [facility];  Service: Gynecology Oncology   • CATARACT EXTRACTION W/ INTRAOCULAR LENS IMPLANT Bilateral    • CHOLECYSTECTOMY OPEN     • CYSTOSCOPY N/A 5/31/2022    Procedure: Venida Black;  Surgeon: Aurora Mesa MD;  Location: [facility];  Service: Gynecology Oncology   • IR PORT PLACEMENT  03/07/2022   • AL LAPS TOTAL HYSTERECT 250 GM/< W/RMVL TUBE/OVARY N/A 5/31/2022    Procedure: ROBOTIC ASSISTED TOTAL LAPAROSCOPIC HYSTERECTOMY, RIGHT SALPINGO-OOPHORECTOMY, LEFT SALPINGECTOMY,;  Surgeon: Aurora Mesa MD;  Location: [facility];  Service: Gynecology Oncology   • SALPINGOOPHORECTOMY N/A     only had one removed and not sure which   • TONSILLECTOMY       No family history on file    Social History     Tobacco Use   • Smoking status: Never   • Smokeless tobacco: Never   Vaping Use   • Vaping Use: Never used   Substance Use Topics   • Alcohol use: Not Currently   • Drug use: Never        Current Outpatient Medications:   •  acetaminophen (TYLENOL) 650 mg CR tablet, Take 1,300 mg by mouth in the morning and 1,300 mg before bedtime  , Disp: , Rfl:   •  al mag oxide-diphenhydramine-lidocaine viscous (MAGIC MOUTHWASH) 1:1:1 suspension, Swish and spit 10 mL every 4 (four) hours as needed for mouth pain or discomfort, Disp: 90 mL, Rfl: 0  •  amLODIPine (NORVASC) 10 mg tablet, , Disp: , Rfl:   •  BD Pen Needle Yoana 2nd Gen 32G X 4 MM MISC, 2 (two) times a day, Disp: , Rfl:   •  Contour Next Test test strip, 2 (two) times a day Test, Disp: , Rfl:   •  fexofenadine (ALLEGRA) 180 MG tablet, every 24 hours, Disp: , Rfl:   •  furosemide (LASIX) 40 mg tablet, Take 1 tablet by mouth daily, Disp: , Rfl:   •  gabapentin (NEURONTIN) 300 mg capsule, Take 300 mg by mouth 3 (three) times a day, Disp: , Rfl:   •  Lenvatinib, 10 MG Daily Dose, (Lenvima, 10 MG Daily Dose,) 10 MG CPPK, Take 10 mg by mouth in the morning, Disp: 30 each, Rfl: 1  •  lidocaine-prilocaine (EMLA) cream, Apply to port site 30 min prior to labs/chemo, Disp: 30 g, Rfl: 2  •  lisinopril (ZESTRIL) 20 mg tablet, Take 20 mg by mouth daily, Disp: , Rfl:   •  lisinopril (ZESTRIL) 40 mg tablet, Take 1 tablet (40 mg total) by mouth daily, Disp: 30 tablet, Rfl: 0  •  LORazepam (ATIVAN) 0 5 mg tablet, Take 0 5 mg by mouth in the morning and 0 5 mg in the evening , Disp: , Rfl:   •  magnesium oxide (MAG-OX) 400 mg, Take 400 mg by mouth in the morning , Disp: , Rfl:   •  metFORMIN (GLUCOPHAGE) 1000 MG tablet, Take 1 tablet by mouth 2 (two) times a day with meals, Disp: , Rfl:   •  Microlet Lancets MISC, 2 (two) times a day Test, Disp: , Rfl:   •  omeprazole (PriLOSEC) 20 mg delayed release capsule, every 24 hours, Disp: , Rfl:   •  ondansetron (ZOFRAN) 8 mg tablet, Take 1 tablet (8 mg total) by mouth every 8 (eight) hours as needed for nausea or vomiting, Disp: 20 tablet, Rfl: 1  •  Rosuvastatin Calcium 20 MG CPSP, Take 1 tablet by mouth daily, Disp: , Rfl:   •  sertraline (ZOLOFT) 50 mg tablet, Take 50 mg by mouth daily, Disp: , Rfl:   •  sitaGLIPtin (JANUVIA) 100 mg tablet, Take 1 tablet by mouth daily, Disp: , Rfl:   •  Toujeo SoloStar 300 units/mL CONCENTRATED U-300 injection pen (1-unit dial), every 12 (twelve) hours 34 units in the am and 36 units at night, Disp: , Rfl:   Allergies   Allergen Reactions   • Empagliflozin Other (See Comments)     Yeast infection   • Exenatide Nausea Only   • Glipizide Other (See Comments)     hypoglycemic   • Repaglinide Nausea Only      There were no vitals filed for this visit

## 2023-01-20 NOTE — PROGRESS NOTES
Arti Monteiro 1955 is a 75 y o  female    Oncology History Overview Note   Radiation Oncology consult for endometrial cancer, referred by Dr Navya Auguste to discuss pelvic radiation for unresectable disease  75year old female with stage IV B high-grade endometrial cancer, liver, retroperitoneal lymph node and lung metastatic disease  Initial diagnosis January 2022 when she presented with 1-2 months of postmenopausal bleeding  She had a pelvic ultrasound at [facility] in Dec 2021 that showed endometrial thickening, as well as a lobulated mass at the cervix measuring 2 4 cm  She proceeded with cervical polypectomy and endometrial biopsy at [address] [place] [address] on 1/4/2022 that revealed high-grade endometrial cancer  CT c/a/p in Feb 2022 revealed extensive pelvic disease, lymph node, liver and lung metastases  Surgery was canceled and she proceeded with systemic chemotherapy with carboplatin/taxol  After 4 cycles of chemotherapy, hysterectomy was attempted and aborted given extensive parametrial disease  She continued with Carbo/Taxol and Avastin  CT scan in Aug 2022 showed disease progression in the right external iliac lymph nodes with stable disease elsewhere  Carbo/Taxol/Avastin was discontinued at that time and Immunotherapy, IV Keytruda q 3 weeks and oral lenvatinib was initiated in September 2022  Recent CT in Jan 2023 shows increasing disease in uterus  She is referred for pelvic RT and continue current systemic treatment with Keytruda and Lenvatinib  1/4/22 Biopsies at [address] [place] [address]  Cervical polyp biopsy:   High grade endometrial carcinoma   B  Endometrium biopsy:  High grade endometrial carcinoma      2/7/22 CT chest abd pelvis  Large mass compatible patient's known endometrial cancer filling the endometrial cavity with evidence of myometrial and possible serosal invasion  Mediastinal and retroperitoneal adenopathy consistent with mariana metastatic disease     Extensive subcentimeter hypodensities throughout the liver concerning for hepatic metastasis  Several scattered pulmonary nodules measuring up to 5 mm which may represent pulmonary metastasis  5/31/22 Robotic assisted BSO and lysis of adhesions  (Hysterectomy aborted due to parametrial/cervical involvement and adhesive disease)  A  Fallopian tube, left, salpingectomy:  -  Complete cross-section of benign fallopian tube with attached benign fimbria showing focal benign paratubal cyst, negative for atypia or malignancy  B  Fallopian tube and ovary, right, salpingo-oophorectomy:  -  Benign ovarian parenchyma with benign corpora lutea and cystic follicles, negative for atypia or malignancy  -  Complete cross-section of benign fallopian tube with attached benign fimbria showing focal benign paratubal cysts, negative for atypia or malignancy      1/10/23 CT chest abd pelvis   1  The heterogeneous mass occupying the uterus appears larger  2   There is a new nonspecific 4 mm subpleural right lower lobe pulmonary nodule  3   Stable appearing mediastinal and left supraclavicular adenopathy  4   Stable appearing numerous hepatic metastases  5   Persistent retroperitoneal and pelvic sidewall adenopathy  Of note, one of the left retroperitoneal lymph nodes appears smaller  1/12/23 [facility], [place]  CT shows mixed response, improvement in retroperitoneal lymphadenopathy with increased disease in the uterus, new 4 mm right lower lobe nodule  She is currently on palliative Keytruda 200 mg IV every 3 weeks and lenvatinib at 10 mg orally daily  She is tolerating treatment very well  Quality of life is good  PLAN: Given stable disease in the liver, decreasing disease and retroperitoneal lymphadenopathy but increasing disease in the uterus, recommended referral to radiation oncology to consider pelvic radiotherapy to address the potentially treatment distant disease in the uterus that was not surgically resectable    Continue Keytruda at 200 mg IV every 3 weeks with lenvatinib 10 mg orally daily for 3 additional cycles followed by repeat CT imaging to assess disease response  [facility], KAREN doty  23 Gyn Onc, Te Dean PA-C       Endometrial cancer ([facility] [address] )   2022 Initial Diagnosis    Endometrial cancer ([facility] [address] )     2022 Biopsy    [facility]    A  Cervical polyp biopsy:   High grade endometrial carcinoma    B  Endometrium biopsy:  High grade endometrial carcinoma     2022 -  Cancer Staged    Staging form: Corpus Uteri - Carcinoma, AJCC 8th Edition  - Clinical: FIGO Stage IVB (cM1) - Signed by Jonathon Odonnell MD on 2022  Histologic grade (G): G3  Histologic grading system: 3 grade system       2022 -  Chemotherapy    Taxol 175 mg/m2 and carboplatin AUC 6 every 21 days  Taxol dose-reduced to 135 mg/m2 with cycle 2 due to arthralgias/myalgias  She has received 4 cycles in the neoadjuvant setting, and 2 following aborting debulking  Carboplatin dose-reduced to AUC 5 with cycle 5 d/t thrombocytopenia  3/11/2022 Genomic Testing    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, ER/TN positive, microsatellite stable  No other targeted therapy opportunities  2022 Surgery    Robotic-assisted BSO and lysis of adhesions  Hysterectomy aborted due to parametrial/cervical involvement and adhesive disease  9/15/2022 -  Chemotherapy    Keytruda 200 mg IV every 21 days with oral lenvatinib 10 mg daily (previously increased from 4 mg)  She is scheduled for cycle 7              Review of Systems:  Review of Systems        [unfilled]  Health Maintenance   Topic Date Due   • Hepatitis C Screening  Never done   • Kidney Health Evaluation: Microalbumin/Creatinine Ratio  Never done   • COVID-19 Vaccine (1) Never done   • Diabetic Foot Exam  Never done   • DM Eye Exam  Never done   • Depression Screening  Never done   • BMI: Followup Plan  Never done   • Annual Physical  Never done   • DTaP,Tdap,and Td Vaccines (1 - Tdap) Never done   • Breast Cancer Screening: Mammogram  Never done   • Colorectal Cancer Screening  Never done   • Osteoporosis Screening  Never done   • Pneumococcal Vaccine: 65+ Years (2 - PCV) 02/25/2016   • Fall Risk  Never done   • Urinary Incontinence Screening  Never done   • Influenza Vaccine (1) 09/01/2022   • HEMOGLOBIN A1C  11/11/2022   • Endometrial Survivorship Visit  Never done   • Weight Management Consult  Never done   • ONC Mammogram  Never done   • Colorectal Surgery Screening  12/26/2022   • Onc DXA Scan  01/09/2023   • Kidney Health Evaluation: GFR  01/16/2024   • BMI: Adult  01/19/2024   • HIB Vaccine  Aged Out   • IPV Vaccine  Aged Out   • Hepatitis A Vaccine  Aged Out   • Meningococcal ACWY Vaccine  Aged Out   • HPV Vaccine  Aged Out     Past Medical History:   Diagnosis Date   • Anxiety    • Arthritis    • Back pain    • Cancer ([facility] [address] )    • Depression    • Diabetes mellitus ([facility] [address] )    • Disease of thyroid gland    • Endometrial cancer ([facility] [address] )    • GERD (gastroesophageal reflux disease)    • Hyperlipidemia associated with type 2 diabetes mellitus ([facility] [address] )    • Hypertension    • Morbid obesity with BMI of 40 0-44 9, adult ([facility] [address] )    • Type 2 diabetes mellitus without complication ([facility] [address] )    • Urinary incontinence    • Wears glasses      Past Surgical History:   Procedure Laterality Date   • ABDOMINAL ADHESION SURGERY N/A 5/31/2022    Procedure: LYSIS ADHESIONS LAPAROSCOPIC María Jean-Baptiste;  Surgeon: Yeny Hernandez MD;  Location: [facility];  Service: Gynecology Oncology   • CATARACT EXTRACTION W/ INTRAOCULAR LENS IMPLANT Bilateral    • CHOLECYSTECTOMY OPEN     • CYSTOSCOPY N/A 5/31/2022    Procedure: Cecelia Cook;  Surgeon: Yeny Hernandez MD;  Location: [facility];  Service: Gynecology Oncology   • IR PORT PLACEMENT  03/07/2022   • GA LAPS TOTAL HYSTERECT 250 GM/< W/RMVL TUBE/OVARY N/A 5/31/2022    Procedure: ROBOTIC ASSISTED TOTAL LAPAROSCOPIC HYSTERECTOMY, RIGHT SALPINGO-OOPHORECTOMY, LEFT SALPINGECTOMY,;  Surgeon: Yevgeniy Naik MD;  Location: [facility];  Service: Gynecology Oncology   • SALPINGOOPHORECTOMY N/A     only had one removed and not sure which   • TONSILLECTOMY       No family history on file  Social History     Tobacco Use   • Smoking status: Never   • Smokeless tobacco: Never   Vaping Use   • Vaping Use: Never used   Substance Use Topics   • Alcohol use: Not Currently   • Drug use: Never        Current Outpatient Medications:   •  acetaminophen (TYLENOL) 650 mg CR tablet, Take 1,300 mg by mouth in the morning and 1,300 mg before bedtime  , Disp: , Rfl:   •  al mag oxide-diphenhydramine-lidocaine viscous (MAGIC MOUTHWASH) 1:1:1 suspension, Swish and spit 10 mL every 4 (four) hours as needed for mouth pain or discomfort, Disp: 90 mL, Rfl: 0  •  amLODIPine (NORVASC) 10 mg tablet, , Disp: , Rfl:   •  BD Pen Needle Yoana 2nd Gen 32G X 4 MM MISC, 2 (two) times a day, Disp: , Rfl:   •  Contour Next Test test strip, 2 (two) times a day Test, Disp: , Rfl:   •  fexofenadine (ALLEGRA) 180 MG tablet, every 24 hours, Disp: , Rfl:   •  furosemide (LASIX) 40 mg tablet, Take 1 tablet by mouth daily, Disp: , Rfl:   •  gabapentin (NEURONTIN) 300 mg capsule, Take 300 mg by mouth 3 (three) times a day, Disp: , Rfl:   •  Lenvatinib, 10 MG Daily Dose, (Lenvima, 10 MG Daily Dose,) 10 MG CPPK, Take 10 mg by mouth in the morning, Disp: 30 each, Rfl: 1  •  lidocaine-prilocaine (EMLA) cream, Apply to port site 30 min prior to labs/chemo, Disp: 30 g, Rfl: 2  •  lisinopril (ZESTRIL) 20 mg tablet, Take 20 mg by mouth daily, Disp: , Rfl:   •  lisinopril (ZESTRIL) 40 mg tablet, Take 1 tablet (40 mg total) by mouth daily, Disp: 30 tablet, Rfl: 0  •  LORazepam (ATIVAN) 0 5 mg tablet, Take 0 5 mg by mouth in the morning and 0 5 mg in the evening , Disp: , Rfl:   •  magnesium oxide (MAG-OX) 400 mg, Take 400 mg by mouth in the morning , Disp: , Rfl:   •  metFORMIN (GLUCOPHAGE) 1000 MG tablet, Take 1 tablet by mouth 2 (two) times a day with meals, Disp: , Rfl:   •  Microlet Lancets MISC, 2 (two) times a day Test, Disp: , Rfl:   •  omeprazole (PriLOSEC) 20 mg delayed release capsule, every 24 hours, Disp: , Rfl:   •  ondansetron (ZOFRAN) 8 mg tablet, Take 1 tablet (8 mg total) by mouth every 8 (eight) hours as needed for nausea or vomiting, Disp: 20 tablet, Rfl: 1  •  Rosuvastatin Calcium 20 MG CPSP, Take 1 tablet by mouth daily, Disp: , Rfl:   •  sertraline (ZOLOFT) 50 mg tablet, Take 50 mg by mouth daily, Disp: , Rfl:   •  sitaGLIPtin (JANUVIA) 100 mg tablet, Take 1 tablet by mouth daily, Disp: , Rfl:   •  Toujeo SoloStar 300 units/mL CONCENTRATED U-300 injection pen (1-unit dial), every 12 (twelve) hours 34 units in the am and 36 units at night, Disp: , Rfl:   Allergies   Allergen Reactions   • Empagliflozin Other (See Comments)     Yeast infection   • Exenatide Nausea Only   • Glipizide Other (See Comments)     hypoglycemic   • Repaglinide Nausea Only      There were no vitals filed for this visit

## 2023-01-23 ENCOUNTER — APPOINTMENT (OUTPATIENT)
Dept: LAB | Facility: HOSPITAL | Age: 68
End: 2023-01-23

## 2023-01-23 NOTE — PROGRESS NOTES
Consultation - Radiation Oncology      LXI:[id number] : 1955  Encounter: [id number]  Patient Information: Jose Barajas  Chief Complaint   Patient presents with   • Consult     Cancer Staging   Endometrial cancer [facility])  Staging form: Corpus Uteri - Carcinoma, AJCC 8th Edition  - Clinical: FIGO Stage IVB (cM1) - Signed by Sam Garsia MD on 2022  Histologic grade (G): G3  Histologic grading system: 3 grade system    Assessment and Plan:  Ms Lesley Arshad is a 75year old woman with Stage IVB, grade 3 endometrial carcinoma with disease metastatic to the liver, lungs, and retroperitoneal/mediastinal LNs  She is s/p multiple lines of systemic therapy, currently on pembro/lenvantinib with oligoprogressive disease in the uterus  She is seen today in consideration of palliative/consolidative RT  We reviewed the role of RT in providing local control to symptomatic or bulky progressing sites of disease in patient's with metastatic disease  While she is currently relatively asymptomatic of her uterine disease (apart from spotting), this is clearly progressing on interval imagine whereas the remainder of her disease is stable  We discussed that in this setting RT would be reasonable to attempt to control the portion of her disease that is not responding to chemo/immunotherapy  For this I would recommend 30-37 5 Gy in 10-15 fractions  Side effects of treatment could include fatigue, myelosuppression, diarrhea/loose stool, and increased urinary frequency  Rarely RT concurrent with VEGF inhibitors can predispose to abdominal bleeds with high dose/fraction treatment (i e  SBRT); given the lower planned RT dose I would favor continuing lenvantinib but will plan to discuss with Dr Brennan Lopez  The patient will return next week for CT simulation with RT to begin approximately 1 week later  I will remain available should any questions or concerns arise       Summary  - Plan to treat uterus alone to 30-37 5 Gy in 10-15 fractions (to be determined at time of RT planning)  - CT simulation next week, RT to start shortly thereafter    - Will coordinate treatment with Dr Lona Sofia  History of Present Illness   Ms Willy Lezama is a 75year old woman with Stage IVB, grade 3 endometrial carcinoma initially diagnosed in 1/2022 after presenting with post-menopausal bleeding with subsequent biopsy showing high grade carcinoma  She underwent CT C/A/P (2/7/22) which demonstrated a large mass compatible with the patient's known endometrial cancer filling the endometrial cavity with evidence of myometrial and possibly serosal invasion; mediastinal and retroperitoneal adenopathy consistent with mariana metastatic disease; extensive subcentimeter hypodensities in the liver concerning for metastases; scattered pulmonary nodules  She was treated with carbo/taxol (2/14/22-6/23/22), carbo/taxol/peña (7/21/22-8/11/22), and more recently on pembro/lenvantinib (8/30/22-present)  Repeat CT C/A/P (1/10/23) demonstrated a mixed response to therapy with the heterogenous mass occupying the uterus to have increased in size with otherwise stable to decreased disease apart from a new non-specific 4mm subpleural nodule in the RLL  She was referred to our office for consideration of palliative/consolidative RT to the uterus  Currently she is feeling well overall  She has no abdominal pain or bloating, no diarrhea or loose stool  She has some vaginal spotting, but no norma bleeding  She has baseline, long-standing bilateral LE edema, which predated her cancer diagnosis  Historical Information   Oncology History   Endometrial cancer ([address] )   1/2022 Initial Diagnosis    Endometrial cancer ([address] )     1/4/2022 Biopsy    [facility]    A  Cervical polyp biopsy:   High grade endometrial carcinoma    B   Endometrium biopsy:  High grade endometrial carcinoma     2/17/2022 -  Cancer Staged    Staging form: Corpus Uteri - Carcinoma, AJCC 8th Edition  - Clinical: FIGO Stage IVB (cM1) - Signed by China Johnson MD on 2/17/2022  Histologic grade (G): G3  Histologic grading system: 3 grade system       2/24/2022 -  Chemotherapy    Taxol 175 mg/m2 and carboplatin AUC 6 every 21 days  Taxol dose-reduced to 135 mg/m2 with cycle 2 due to arthralgias/myalgias  She has received 4 cycles in the neoadjuvant setting, and 2 following aborting debulking  Carboplatin dose-reduced to AUC 5 with cycle 5 d/t thrombocytopenia  3/11/2022 Genomic Testing    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, ER/WI positive, microsatellite stable  No other targeted therapy opportunities  5/31/2022 Surgery    Robotic-assisted BSO and lysis of adhesions  Hysterectomy aborted due to parametrial/cervical involvement and adhesive disease  9/15/2022 -  Chemotherapy    Keytruda 200 mg IV every 21 days with oral lenvatinib 10 mg daily (previously increased from 4 mg)  She is scheduled for cycle 7                Past Medical History:   Diagnosis Date   • Anxiety    • Arthritis    • Back pain    • Cancer (HCC)    • Depression    • Diabetes mellitus ([address] )    • Disease of thyroid gland    • Endometrial cancer ([address] )    • GERD (gastroesophageal reflux disease)    • Hyperlipidemia associated with type 2 diabetes mellitus ([address] )    • Hypertension    • Morbid obesity with BMI of 40 0-44 9, adult ([facility] [address] )    • Type 2 diabetes mellitus without complication ([address] )    • Urinary incontinence    • Wears glasses      Past Surgical History:   Procedure Laterality Date   • ABDOMINAL ADHESION SURGERY N/A 5/31/2022    Procedure: Marti Banks;  Surgeon: China Johnson MD;  Location: [facility];  Service: Gynecology Oncology   • CATARACT EXTRACTION W/ INTRAOCULAR LENS IMPLANT Bilateral    • CHOLECYSTECTOMY OPEN     • CYSTOSCOPY N/A 5/31/2022    Procedure: Lorenzo Pierson;  Surgeon: China Johnson MD;  Location: [facility];  Service: Gynecology Oncology   • IR PORT PLACEMENT  03/07/2022   • DC LAPS TOTAL HYSTERECT 250 GM/< W/RMVL TUBE/OVARY N/A 5/31/2022    Procedure: ROBOTIC ASSISTED TOTAL LAPAROSCOPIC HYSTERECTOMY, RIGHT SALPINGO-OOPHORECTOMY, LEFT SALPINGECTOMY,;  Surgeon: Anthony Cruz MD;  Location: [facility];  Service: Gynecology Oncology   • SALPINGOOPHORECTOMY N/A     only had one removed and not sure which   • TONSILLECTOMY         Family History   Problem Relation Age of Onset   • Pancreatic cancer Father    • Colon cancer Maternal Aunt        Social History   Social History     Substance and Sexual Activity   Alcohol Use Not Currently     Social History     Substance and Sexual Activity   Drug Use Never     Social History     Tobacco Use   Smoking Status Never   Smokeless Tobacco Never         Meds/Allergies     Current Outpatient Medications:   •  acetaminophen (TYLENOL) 650 mg CR tablet, Take 1,300 mg by mouth in the morning and 1,300 mg before bedtime  , Disp: , Rfl:   •  al mag oxide-diphenhydramine-lidocaine viscous (MAGIC MOUTHWASH) 1:1:1 suspension, Swish and spit 10 mL every 4 (four) hours as needed for mouth pain or discomfort, Disp: 90 mL, Rfl: 0  •  amLODIPine (NORVASC) 10 mg tablet, , Disp: , Rfl:   •  BD Pen Needle Yoana 2nd Gen 32G X 4 MM MISC, 2 (two) times a day, Disp: , Rfl:   •  Contour Next Test test strip, 2 (two) times a day Test, Disp: , Rfl:   •  fexofenadine (ALLEGRA) 180 MG tablet, every 24 hours, Disp: , Rfl:   •  furosemide (LASIX) 40 mg tablet, Take 1 tablet by mouth daily, Disp: , Rfl:   •  lidocaine-prilocaine (EMLA) cream, Apply to port site 30 min prior to labs/chemo, Disp: 30 g, Rfl: 2  •  lisinopril (ZESTRIL) 40 mg tablet, Take 1 tablet (40 mg total) by mouth daily, Disp: 30 tablet, Rfl: 0  •  LORazepam (ATIVAN) 0 5 mg tablet, Take 0 5 mg by mouth in the morning and 0 5 mg in the evening , Disp: , Rfl:   •  metFORMIN (GLUCOPHAGE) 1000 MG tablet, Take 1 tablet by mouth 2 (two) times a day with meals, Disp: , Rfl:   •  Microlet Lancets MISC, 2 (two) times a day Test, Disp: , Rfl:   •  omeprazole (PriLOSEC) 20 mg delayed release capsule, every 24 hours, Disp: , Rfl:   •  ondansetron (ZOFRAN) 8 mg tablet, Take 1 tablet (8 mg total) by mouth every 8 (eight) hours as needed for nausea or vomiting, Disp: 20 tablet, Rfl: 1  •  Rosuvastatin Calcium 20 MG CPSP, Take 1 tablet by mouth daily, Disp: , Rfl:   •  sitaGLIPtin (JANUVIA) 100 mg tablet, Take 1 tablet by mouth daily, Disp: , Rfl:   •  Toujeo SoloStar 300 units/mL CONCENTRATED U-300 injection pen (1-unit dial), every 12 (twelve) hours 34 units in the am and 36 units at night, Disp: , Rfl:   •  gabapentin (NEURONTIN) 300 mg capsule, Take 300 mg by mouth 3 (three) times a day (Patient not taking: Reported on 1/20/2023), Disp: , Rfl:   •  Lenvatinib, 10 MG Daily Dose, (Lenvima, 10 MG Daily Dose,) 10 MG CPPK, Take 10 mg by mouth in the morning, Disp: 30 each, Rfl: 1  •  lisinopril (ZESTRIL) 20 mg tablet, Take 20 mg by mouth daily (Patient not taking: Reported on 1/20/2023), Disp: , Rfl:   •  magnesium oxide (MAG-OX) 400 mg, Take 400 mg by mouth in the morning  (Patient not taking: Reported on 1/20/2023), Disp: , Rfl:   •  sertraline (ZOLOFT) 50 mg tablet, Take 50 mg by mouth daily (Patient not taking: Reported on 1/20/2023), Disp: , Rfl:   Allergies   Allergen Reactions   • Empagliflozin Other (See Comments)     Yeast infection   • Exenatide Nausea Only   • Glipizide Other (See Comments)     hypoglycemic   • Repaglinide Nausea Only     Review of Systems   Constitutional: Positive for fatigue (d/t immunotherapy) and unexpected weight change (wt loss )  HENT: Positive for mouth sores (roof of mouth sore uses MMW ), postnasal drip, rhinorrhea, sinus pressure and sneezing  Eyes:        Wears glasses , cataract surgery    Respiratory: Negative  Cardiovascular: Positive for leg swelling (left >right )  Gastrointestinal: Positive for nausea   Negative for abdominal distention, abdominal pain, constipation and diarrhea  Endocrine: Positive for cold intolerance  Genitourinary: Positive for vaginal bleeding (" spotting" )  Negative for difficulty urinating and dysuria  Bladder control issues    Musculoskeletal: Positive for arthralgias, back pain and joint swelling  Skin: Negative  Allergic/Immunologic: Negative  Neurological: Negative  Hematological: Negative  Psychiatric/Behavioral: Negative for self-injury and suicidal ideas  The patient is not nervous/anxious           Clinical Trial: no     OB/GYN History:  The patient underwent menarche at 10yrs  Menopause Status   No LMP recorded  Patient has had a hysterectomy  oophorectomy   Menopause at 49} years  Menopause Reason age/natureal   Hormone replacement therapy: no      1   Para 1   Age at first delivery being 28 years  Nursing: no    Birth control pills: yes  Years used not sure        OBJECTIVE:   /80   Pulse 97   Temp 97 5 °F (36 4 °C) (Temporal)   Resp 18   Ht 5' 2 5" (1 588 m)   Wt 91 6 kg (202 lb)   SpO2 99%   BMI 36 36 kg/m²   Pain Assessment:  0  Performance Status: ECOG/Zubrod/WHO: 1 - Symptomatic but completely ambulatory    Physical Exam   Generally well appearing  NAD    RRR  No murmurs  No increased work of breathing  Abd soft, NT, ND  No masses appreciated  LE edema noted bilaterally  Pelvic exam deferred       RESULTS  Lab Results    Chemistry        Component Value Date/Time    K 3 6 2023 1202    K 4 3 2022 1023     2023 1202    CL 99 2022 1023    CO2 25 2023 1202    CO2 23 2022 1023    BUN 18 2023 1202    BUN 18 2022 1023    CREATININE 1 07 2023 1202        Component Value Date/Time    CALCIUM 9 4 2023 1202    ALKPHOS 84 2023 1202    AST 19 2023 1202    AST 15 2022 1023    ALT 19 2023 1202    ALT 17 2022 1023            Lab Results   Component Value Date    WBC 6 60 01/16/2023    HGB 9 8 (L) 01/16/2023    HCT 33 6 (L) 01/16/2023    MCV 91 01/16/2023     01/16/2023         Imaging Studies  CT chest abdomen pelvis w contrast    Result Date: 1/11/2023  Narrative: CT CHEST, ABDOMEN AND PELVIS WITH IV CONTRAST INDICATION:   C54 1: Malignant neoplasm of endometrium  COMPARISON:  11/3/2022 TECHNIQUE: CT examination of the chest, abdomen and pelvis was performed  Axial, sagittal, and coronal 2D reformatted images were created from the source data and submitted for interpretation  Radiation dose length product (DLP) for this visit:  964 14 mGy-cm   This examination, like all CT scans performed in the [facility], was performed utilizing techniques to minimize radiation dose exposure, including the use of iterative  reconstruction and automated exposure control  IV Contrast:  100 mL of iohexol (OMNIPAQUE) Enteric Contrast: Enteric contrast was administered  FINDINGS: CHEST LUNGS:  Numerous bilateral subpleural subcentimeter pulmonary nodules measuring between 3 and 5 mm in size are stable  These nodules are indicated on series 3  However, a new 4 mm subpleural right lower lobe nodule is noted on series 3, image 64  There is a stable 4 mm left upper lobe nodule (3/61)  There is a stable 3 mm subpleural right lower lobe nodule (3/62)  Stable 3 mm left upper lobe nodule (3/32)  Stable 3 mm right lower lobe nodule (3/74)  PLEURA:  Unremarkable  HEART/GREAT VESSELS: Heart is unremarkable for patient's age  No thoracic aortic aneurysm  MEDIASTINUM AND EDISON:  Mediastinal adenopathy appears similar to prior  Precarinal lymph node has a short axis of 1 4 cm, similar to prior at 1 5 cm  Subcarinal adenopathy has a short axis of 1 cm, similar to prior  Left supraclavicular adenopathy persists  The more medial one has a short axis of 1 4 cm, similar to prior  The more lateral lymph node has a short axis of 1 5 cm, similar to prior   CHEST WALL AND LOWER NECK: Implanted port right anterior chest wall  ABDOMEN LIVER/BILIARY TREE:  Innumerable hypodense lesions throughout the liver appears similar to prior consistent with extensive metastases  GALLBLADDER:  Gallbladder is surgically absent  SPLEEN:  Stable nonspecific heterogeneity of the spleen  PANCREAS:  Unremarkable  ADRENAL GLANDS:  Unremarkable  KIDNEYS/URETERS:  Unremarkable  No hydronephrosis  STOMACH AND BOWEL:  Unremarkable  APPENDIX:  No findings to suggest appendicitis  ABDOMINOPELVIC CAVITY:  Left retroperitoneal periaortic lymph node a smaller measuring 2 9 x 2 2 cm, previously 3 5 x 2 9 cm  Aortocaval lymph node measures 2 3 x 1 8 cm, similar to prior (2/70)  Right pelvic sidewall lymph node appears stable measuring 2 1 x 1 9 cm (2/97)  No ascites  No pneumoperitoneum  VESSELS:  Unremarkable for patient's age  PELVIS REPRODUCTIVE ORGANS:  The heterogeneous mass occupying the uterus appears larger now measuring 11 x 8 1 x 6 1 cm, previously 10 7 x 7 7 x 5 4 cm  URINARY BLADDER:  Unremarkable  ABDOMINAL WALL/INGUINAL REGIONS:  Unremarkable  OSSEOUS STRUCTURES:  No acute fracture or destructive osseous lesion  Impression: 1  The heterogeneous mass occupying the uterus appears larger  2   There is a new nonspecific 4 mm subpleural right lower lobe pulmonary nodule  3   Stable appearing mediastinal and left supraclavicular adenopathy  4   Stable appearing numerous hepatic metastases  5   Persistent retroperitoneal and pelvic sidewall adenopathy  Of note, one of the left retroperitoneal lymph nodes appears smaller  Workstation performed: [id number]         Pathology:        ASSESSMENT  1   Endometrial cancer [facility])  Ambulatory referral to Radiation Oncology    Radiation Simulation Treatment        Cancer Staging   Endometrial cancer [facility])  Staging form: Corpus Uteri - Carcinoma, AJCC 8th Edition  - Clinical: FIGO Stage IVB (cM1) - Signed by Dorothy Bernheim, MD on 2/17/2022  Histologic grade (G): G3  Histologic grading system: 3 grade system        PLAN/DISCUSSION  Orders Placed This Encounter   Procedures   • [address]      Conchita Zambrano MD  1/23/2023,8:29 AM    Total time spent reviewing EMR, seeing patient in consultation, documenting visit, placing treatment orders, and communicating with other medical providers: 48 minutes  Portions of the record may have been created with voice recognition software  Occasional wrong word or "sound a like" substitutions may have occurred due to the inherent limitations of voice recognition software  Read the chart carefully and recognize, using context, where substitutions have occurred

## 2023-01-25 ENCOUNTER — APPOINTMENT (OUTPATIENT)
Dept: RADIATION ONCOLOGY | Facility: CLINIC | Age: 68
End: 2023-01-25
Attending: STUDENT IN AN ORGANIZED HEALTH CARE EDUCATION/TRAINING PROGRAM

## 2023-01-26 ENCOUNTER — TELEPHONE (OUTPATIENT)
Dept: GYNECOLOGIC ONCOLOGY | Facility: CLINIC | Age: 68
End: 2023-01-26

## 2023-01-26 ENCOUNTER — APPOINTMENT (OUTPATIENT)
Dept: RADIATION ONCOLOGY | Facility: HOSPITAL | Age: 68
End: 2023-01-26

## 2023-01-26 NOTE — TELEPHONE ENCOUNTER
Patient called into the office to speak with [facility] regarding chemo and the radiation treatments   Patient can be reached back @ [phone]

## 2023-01-26 NOTE — TELEPHONE ENCOUNTER
Return call placed to patient  Plan to start RT on 2/6 (10-15 treatments planned)  Will hold lenvima starting 1/30 and cancel keytruda treatment on 2/9  Patient will call with an update when she knows her last day of RT

## 2023-01-27 DIAGNOSIS — C54.1 ENDOMETRIAL CANCER (HCC): ICD-10-CM

## 2023-01-27 RX ORDER — LENVATINIB 10 MG/1
CAPSULE ORAL
Qty: 30 EACH | Refills: 2 | Status: SHIPPED | OUTPATIENT
Start: 2023-01-27

## 2023-01-30 ENCOUNTER — APPOINTMENT (OUTPATIENT)
Dept: LAB | Facility: HOSPITAL | Age: 68
End: 2023-01-30

## 2023-02-03 ENCOUNTER — DOCUMENTATION (OUTPATIENT)
Dept: HEMATOLOGY ONCOLOGY | Facility: CLINIC | Age: 68
End: 2023-02-03

## 2023-02-03 NOTE — PROGRESS NOTES
Copay card listed below has since    Please see below and attached new funding information    [address]   ID# [id number]  BIN# [id number]  [facility]# [id number]  NO PCN  COPAY $0 w/ $40,000 limit  EXP 23    Information forwarded to pharmacy and team

## 2023-02-06 ENCOUNTER — APPOINTMENT (OUTPATIENT)
Dept: RADIATION ONCOLOGY | Facility: HOSPITAL | Age: 68
End: 2023-02-06
Attending: STUDENT IN AN ORGANIZED HEALTH CARE EDUCATION/TRAINING PROGRAM

## 2023-02-10 ENCOUNTER — TELEPHONE (OUTPATIENT)
Dept: GYNECOLOGIC ONCOLOGY | Facility: CLINIC | Age: 68
End: 2023-02-10

## 2023-02-10 NOTE — TELEPHONE ENCOUNTER
Patient called into the office requesting to change her pre-cycle appointment with Lang Colón on 2 23 23- Patient stated that due to her radiation treatment being in [place] she does not believe she will make it down to [facility] in enough time   Patient can be reached back @ [phone]

## 2023-02-10 NOTE — TELEPHONE ENCOUNTER
Spoke with patient  Appointment on 2/23 cancelled  Patient was r/s to 2/27 as a virtual appointment due to scheduling conflict on 6/15

## 2023-02-13 ENCOUNTER — APPOINTMENT (OUTPATIENT)
Dept: LAB | Facility: HOSPITAL | Age: 68
End: 2023-02-13

## 2023-02-13 DIAGNOSIS — C54.1 ENDOMETRIAL CANCER (HCC): ICD-10-CM

## 2023-02-13 DIAGNOSIS — C54.1 ENDOMETRIAL CANCER (HCC): Primary | ICD-10-CM

## 2023-02-13 LAB
BASOPHILS # BLD AUTO: 0.02 THOUSANDS/ÂΜL (ref 0–0.1)
BASOPHILS NFR BLD AUTO: 0 % (ref 0–1)
EOSINOPHIL # BLD AUTO: 0.1 THOUSAND/ÂΜL (ref 0–0.61)
EOSINOPHIL NFR BLD AUTO: 2 % (ref 0–6)
ERYTHROCYTE [DISTWIDTH] IN BLOOD BY AUTOMATED COUNT: 19.5 % (ref 11.6–15.1)
HCT VFR BLD AUTO: 29.4 % (ref 34.8–46.1)
HGB BLD-MCNC: 8.3 G/DL (ref 11.5–15.4)
IMM GRANULOCYTES # BLD AUTO: 0.07 THOUSAND/UL (ref 0–0.2)
IMM GRANULOCYTES NFR BLD AUTO: 1 % (ref 0–2)
LYMPHOCYTES # BLD AUTO: 0.81 THOUSANDS/ÂΜL (ref 0.6–4.47)
LYMPHOCYTES NFR BLD AUTO: 13 % (ref 14–44)
MCH RBC QN AUTO: 26 PG (ref 26.8–34.3)
MCHC RBC AUTO-ENTMCNC: 28.2 G/DL (ref 31.4–37.4)
MCV RBC AUTO: 92 FL (ref 82–98)
MONOCYTES # BLD AUTO: 0.73 THOUSAND/ÂΜL (ref 0.17–1.22)
MONOCYTES NFR BLD AUTO: 12 % (ref 4–12)
NEUTROPHILS # BLD AUTO: 4.52 THOUSANDS/ÂΜL (ref 1.85–7.62)
NEUTS SEG NFR BLD AUTO: 72 % (ref 43–75)
NRBC BLD AUTO-RTO: 1 /100 WBCS
PLATELET # BLD AUTO: 214 THOUSANDS/UL (ref 149–390)
PMV BLD AUTO: 10.2 FL (ref 8.9–12.7)
RBC # BLD AUTO: 3.19 MILLION/UL (ref 3.81–5.12)
WBC # BLD AUTO: 6.25 THOUSAND/UL (ref 4.31–10.16)

## 2023-02-27 ENCOUNTER — APPOINTMENT (OUTPATIENT)
Dept: LAB | Facility: HOSPITAL | Age: 68
End: 2023-02-27

## 2023-02-27 ENCOUNTER — TELEMEDICINE (OUTPATIENT)
Dept: GYNECOLOGIC ONCOLOGY | Facility: CLINIC | Age: 68
End: 2023-02-27

## 2023-02-27 ENCOUNTER — TELEPHONE (OUTPATIENT)
Dept: HEMATOLOGY ONCOLOGY | Facility: CLINIC | Age: 68
End: 2023-02-27

## 2023-02-27 DIAGNOSIS — C54.1 ENDOMETRIAL CANCER (HCC): ICD-10-CM

## 2023-02-27 RX ORDER — ONDANSETRON HYDROCHLORIDE 8 MG/1
8 TABLET, FILM COATED ORAL EVERY 8 HOURS PRN
Qty: 20 TABLET | Refills: 1 | Status: SHIPPED | OUTPATIENT
Start: 2023-02-27

## 2023-02-27 NOTE — PROGRESS NOTES
Virtual Regular Visit    Verification of patient location:    Patient is located in the following state in which I hold an active license [place]      Assessment/Plan:    Problem List Items Addressed This Visit        Genitourinary    Endometrial cancer ([facility] [address] )     Progressive stage IVB, grade 3 endometrial cancer with liver/lung and retroperitoneal metastases currently receiving palliative treatment with keytruda 200 mg IV every 21 days with lenvatinib 10 mg PO daily  Treatment has been held since 1/30/23 as patient received 15 fractions RT to pelvis  She tolerated radiation well, with minimal side-effects  This was completed on 2/24/23  She has treatment-related fatigue  Plan to restart keytruda/lenvima on 3/2/23 as planned as long as her metabolic and hematologic parameters are adequate  Repeat CT imaging to be performed after completion of cycle 8  Return to the office as per her chemotherapy calendar  Relevant Medications    ondansetron (ZOFRAN) 8 mg tablet    Other Relevant Orders    CT chest abdomen pelvis w contrast            Reason for visit is   Chief Complaint   Patient presents with   • Virtual Regular Visit        Encounter provider Pia Moreau PA-C    Provider located at [address] [address] [phone]      Recent Visits  No visits were found meeting these conditions  Showing recent visits within past 7 days and meeting all other requirements  Today's Visits  Date Type Provider Dept   02/27/23 Telemedicine Pia Moreau PA-C [facility] today's visits and meeting all other requirements  Future Appointments  No visits were found meeting these conditions  Showing future appointments within next 150 days and meeting all other requirements       The patient was identified by name and date of birth   Dick Augustine was informed that this is a telemedicine visit and that the visit is being conducted through Telephone  My office door was closed  No one else was in the room  She acknowledged consent and understanding of privacy and security of the video platform  The patient has agreed to participate and understands they can discontinue the visit at any time  Patient is aware this is a billable service  Subjective  Ruth Alejnadro is a 75 y o  female   who presents virtually for pre-chemotherapy evaluation  The patient last cycle of Velton Grass was given on 1/26/23  In the interim, she underwent palliative RT to the pelvis, for 15 fractions  She completed this on 2/24/23  The patient is fairly asymptomatic  She notes occasional diarrhea  She is fatigued  Appetite is normal  She denies n/v/abdominal pain  Normal bladder function  Keytruda and lenvima have been held during RT  She is due for labs this week  Oncology History   Endometrial cancer ([facility] [address] )   1/2022 Initial Diagnosis    Endometrial cancer ([facility] [address] )     1/4/2022 Biopsy    [facility]    A  Cervical polyp biopsy:   High grade endometrial carcinoma    B  Endometrium biopsy:  High grade endometrial carcinoma     2/17/2022 -  Cancer Staged    Staging form: Corpus Uteri - Carcinoma, AJCC 8th Edition  - Clinical: FIGO Stage IVB (cM1) - Signed by Sam Garsia MD on 2/17/2022  Histologic grade (G): G3  Histologic grading system: 3 grade system       2/24/2022 -  Chemotherapy    Taxol 175 mg/m2 and carboplatin AUC 6 every 21 days  Taxol dose-reduced to 135 mg/m2 with cycle 2 due to arthralgias/myalgias  She has received 4 cycles in the neoadjuvant setting, and 2 following aborting debulking  Carboplatin dose-reduced to AUC 5 with cycle 5 d/t thrombocytopenia  3/11/2022 Genomic Testing    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, ER/ND positive, microsatellite stable  No other targeted therapy opportunities  5/31/2022 Surgery    Robotic-assisted BSO and lysis of adhesions   Hysterectomy aborted due to parametrial/cervical involvement and adhesive disease  9/15/2022 -  Chemotherapy    Keytruda 200 mg IV every 21 days with oral lenvatinib 10 mg daily (previously increased from 4 mg)  She is scheduled for cycle 8          - 2/24/2023 Radiation    Per patient, she completed 15 fractions to pelvis on 2/24/23              Past Medical History:   Diagnosis Date   • Anxiety    • Arthritis    • Back pain    • Cancer (HCC)    • Depression    • Diabetes mellitus ([facility] )    • Disease of thyroid gland    • Endometrial cancer ([place] [address] )    • GERD (gastroesophageal reflux disease)    • Hyperlipidemia associated with type 2 diabetes mellitus ([place] [address] )    • Hypertension    • Morbid obesity with BMI of 40 0-44 9, adult ([place] [address] )    • Type 2 diabetes mellitus without complication ([place] [address] )    • Urinary incontinence    • Wears glasses        Past Surgical History:   Procedure Laterality Date   • ABDOMINAL ADHESION SURGERY N/A 5/31/2022    Procedure: David Harper;  Surgeon: Abdoul Lu MD;  Location: [facility];  Service: Gynecology Oncology   • CATARACT EXTRACTION W/ INTRAOCULAR LENS IMPLANT Bilateral    • CHOLECYSTECTOMY OPEN     • CYSTOSCOPY N/A 5/31/2022    Procedure: Emily Herman;  Surgeon: Abdoul Lu MD;  Location: [facility];  Service: Gynecology Oncology   • IR PORT PLACEMENT  03/07/2022   • NE LAPS TOTAL HYSTERECT 250 GM/< W/RMVL TUBE/OVARY N/A 5/31/2022    Procedure: ROBOTIC ASSISTED TOTAL LAPAROSCOPIC HYSTERECTOMY, RIGHT SALPINGO-OOPHORECTOMY, LEFT SALPINGECTOMY,;  Surgeon: Abdoul Lu MD;  Location: [facility];  Service: Gynecology Oncology   • SALPINGOOPHORECTOMY N/A     only had one removed and not sure which   • TONSILLECTOMY         Current Outpatient Medications   Medication Sig Dispense Refill   • ondansetron (ZOFRAN) 8 mg tablet Take 1 tablet (8 mg total) by mouth every 8 (eight) hours as needed for nausea or vomiting 20 tablet 1   • acetaminophen (TYLENOL) 650 mg CR tablet Take 1,300 mg by mouth in the morning and 1,300 mg before bedtime  • al mag oxide-diphenhydramine-lidocaine viscous (MAGIC MOUTHWASH) 1:1:1 suspension Swish and spit 10 mL every 4 (four) hours as needed for mouth pain or discomfort 90 mL 0   • amLODIPine (NORVASC) 10 mg tablet      • BD Pen Needle Yoana 2nd Gen 32G X 4 MM MISC 2 (two) times a day     • Contour Next Test test strip 2 (two) times a day Test     • fexofenadine (ALLEGRA) 180 MG tablet every 24 hours     • furosemide (LASIX) 40 mg tablet Take 1 tablet by mouth daily     • gabapentin (NEURONTIN) 300 mg capsule Take 300 mg by mouth 3 (three) times a day (Patient not taking: Reported on 1/20/2023)     • Lenvima, 10 MG Daily Dose, 10 MG CPPK TAKE 1 CAPSULE BY MOUTH DAILY 30 each 2   • lidocaine-prilocaine (EMLA) cream Apply to port site 30 min prior to labs/chemo 30 g 2   • lisinopril (ZESTRIL) 20 mg tablet Take 20 mg by mouth daily (Patient not taking: Reported on 1/20/2023)     • lisinopril (ZESTRIL) 40 mg tablet Take 1 tablet (40 mg total) by mouth daily 30 tablet 0   • LORazepam (ATIVAN) 0 5 mg tablet Take 0 5 mg by mouth in the morning and 0 5 mg in the evening  • magnesium oxide (MAG-OX) 400 mg Take 400 mg by mouth in the morning   (Patient not taking: Reported on 1/20/2023)     • metFORMIN (GLUCOPHAGE) 1000 MG tablet Take 1 tablet by mouth 2 (two) times a day with meals     • Microlet Lancets MISC 2 (two) times a day Test     • omeprazole (PriLOSEC) 20 mg delayed release capsule every 24 hours     • Rosuvastatin Calcium 20 MG CPSP Take 1 tablet by mouth daily     • sertraline (ZOLOFT) 50 mg tablet Take 50 mg by mouth daily (Patient not taking: Reported on 1/20/2023)     • sitaGLIPtin (JANUVIA) 100 mg tablet Take 1 tablet by mouth daily     • Toujeo SoloStar 300 units/mL CONCENTRATED U-300 injection pen (1-unit dial) every 12 (twelve) hours 34 units in the am and 36 units at night       No current facility-administered medications for this visit  Allergies   Allergen Reactions   • Empagliflozin Other (See Comments)     Yeast infection   • Exenatide Nausea Only   • Glipizide Other (See Comments)     hypoglycemic   • Repaglinide Nausea Only       Review of Systems   Constitutional: Positive for fatigue  Negative for fever  HENT: Negative  Eyes: Negative  Respiratory: Negative  Cardiovascular: Negative  Gastrointestinal: Negative  Genitourinary: Negative  Musculoskeletal: Negative  Skin: Negative  Neurological: Negative  Psychiatric/Behavioral: Negative  Video Exam    There were no vitals filed for this visit  Visit performed via audio only  No video capabilities available      I spent 20 minutes with patient today in which greater than 50% of the time was spent in counseling/coordination of care regarding chemotherapy

## 2023-02-27 NOTE — ASSESSMENT & PLAN NOTE
Progressive stage IVB, grade 3 endometrial cancer with liver/lung and retroperitoneal metastases currently receiving palliative treatment with keytruda 200 mg IV every 21 days with lenvatinib 10 mg PO daily  Treatment has been held since 1/30/23 as patient received 15 fractions RT to pelvis  She tolerated radiation well, with minimal side-effects  This was completed on 2/24/23  She has treatment-related fatigue  Plan to restart keytruda/lenvima on 3/2/23 as planned as long as her metabolic and hematologic parameters are adequate  Repeat CT imaging to be performed after completion of cycle 8  Return to the office as per her chemotherapy calendar

## 2023-02-27 NOTE — TELEPHONE ENCOUNTER
Confirmed with patient appt  On 3/20/23 for CT at the [facility]  Patient must  2 bottles of barium prior to the test  Went over the instructions for drinking the barium

## 2023-02-28 ENCOUNTER — DOCUMENTATION (OUTPATIENT)
Dept: HEMATOLOGY ONCOLOGY | Facility: CLINIC | Age: 68
End: 2023-02-28

## 2023-02-28 NOTE — PROGRESS NOTES
Oncology Finance Advocacy Intake and Intervention  Oncology Finance Counselor/Advocate placed call to patient  This writer informed patient that this writer is here to assist patient with billing questions, financial assistance, payment/payment plans, quotes, copayment assistance, insurance optimization, and insurance navigation  This writer conducted a thorough benefit review of copayment, deductible, and out of pocket cost  This information is documented below and has been reviewed with patient  Copayment: N/A  Deductible: $600 MET REMAINING $0  Out of Pocket Cost: $4000 MET $[id number] REMAINING $[id number]  Insurance optimization (Limited benefit vs self-pay):  Patient assistance status:  Free Drug Applications:  Interventions:  Called pt & went over her benefits she sd that she pays for the [place] out of her pocket & she knows that she is on a payment plan & that she pays $100 a month but she says that with the [place] that will be a long time before she will be able to pay it off  Talked to her about possible getting help for the medication they have a co-pay program  that I can send her the application  She sd that she has no way to print it or sin it because she only has her phone  Told her I will get the application & have the dr sign it & then  I will get the application to her so she can sign it  Emailed the application to [facility] for Dr Caleb Jara to sign          Information above was review thoroughly with patient and patient was advise of possible assistance programs/interventions  If any question arise patient can contact this writer at below information  This information was given to patient at time of contact  Skylar Chicas  Phone:[phone]  Email: [email or web address]

## 2023-03-01 RX ORDER — SODIUM CHLORIDE 9 MG/ML
20 INJECTION, SOLUTION INTRAVENOUS ONCE
Status: CANCELLED | OUTPATIENT
Start: 2023-03-02

## 2023-03-02 ENCOUNTER — HOSPITAL ENCOUNTER (OUTPATIENT)
Dept: INFUSION CENTER | Facility: HOSPITAL | Age: 68
Discharge: HOME | End: 2023-03-02
Attending: OBSTETRICS & GYNECOLOGY

## 2023-03-02 VITALS
HEART RATE: 101 BPM | SYSTOLIC BLOOD PRESSURE: 141 MMHG | DIASTOLIC BLOOD PRESSURE: 72 MMHG | BODY MASS INDEX: 36.31 KG/M2 | WEIGHT: 201.72 LBS | RESPIRATION RATE: 18 BRPM | TEMPERATURE: 97.3 F | OXYGEN SATURATION: 98 %

## 2023-03-02 DIAGNOSIS — C54.1 ENDOMETRIAL CANCER (HCC): Primary | ICD-10-CM

## 2023-03-02 RX ORDER — SODIUM CHLORIDE 9 MG/ML
20 INJECTION, SOLUTION INTRAVENOUS ONCE
Status: COMPLETED | OUTPATIENT
Start: 2023-03-02 | End: 2023-03-02

## 2023-03-02 RX ADMIN — SODIUM CHLORIDE 20 ML/HR: 9 INJECTION, SOLUTION INTRAVENOUS at 09:36

## 2023-03-02 RX ADMIN — SODIUM CHLORIDE 200 MG: 9 INJECTION, SOLUTION INTRAVENOUS at 09:43

## 2023-03-02 NOTE — PROGRESS NOTES
Pt tolerated treatment with no adv reactions; pt aware of next appt; left unit ambulatory with steady gait

## 2023-03-06 ENCOUNTER — APPOINTMENT (OUTPATIENT)
Dept: LAB | Facility: HOSPITAL | Age: 68
End: 2023-03-06

## 2023-03-07 ENCOUNTER — DOCUMENTATION (OUTPATIENT)
Dept: HEMATOLOGY ONCOLOGY | Facility: CLINIC | Age: 68
End: 2023-03-07

## 2023-03-07 NOTE — PROGRESS NOTES
Received email from [facility] that she thought  The pt was already enrolled in a program for [employer] & spoke to  April she sd that there is no record of this pt being enrolled into the [place] program  Emailed that back to [facility] & asked her to complete the applicaiton

## 2023-03-08 ENCOUNTER — DOCUMENTATION (OUTPATIENT)
Dept: HEMATOLOGY ONCOLOGY | Facility: CLINIC | Age: 68
End: 2023-03-08

## 2023-03-08 NOTE — PROGRESS NOTES
Called pt to see about her signing the [employer] application I emailed the application to her # [email or web address]  Pt sd that she will sign it & send it back to me  Pt did email it back & I faxed the application to [facility] pt & told her that I received it & faxed it & will f/u w/them in a day or so    She thanked me for the call & the help

## 2023-03-10 ENCOUNTER — DOCUMENTATION (OUTPATIENT)
Dept: HEMATOLOGY ONCOLOGY | Facility: CLINIC | Age: 68
End: 2023-03-10

## 2023-03-10 NOTE — PROGRESS NOTES
Called [employer] & spoke to kaushik sutherland that they never recvd the application she asked me to refax it to [phone]  refaxed the application

## 2023-03-13 ENCOUNTER — APPOINTMENT (OUTPATIENT)
Dept: LAB | Facility: HOSPITAL | Age: 68
End: 2023-03-13

## 2023-03-14 ENCOUNTER — DOCUMENTATION (OUTPATIENT)
Dept: HEMATOLOGY ONCOLOGY | Facility: CLINIC | Age: 68
End: 2023-03-14

## 2023-03-14 NOTE — PROGRESS NOTES
Called [employer] & spoke to Richard she sd that they need the date closer to the pts signature  Put the date closer to the signature & refaxed the application

## 2023-03-20 ENCOUNTER — HOSPITAL ENCOUNTER (OUTPATIENT)
Dept: CT IMAGING | Facility: HOSPITAL | Age: 68
Discharge: HOME | End: 2023-03-20

## 2023-03-20 DIAGNOSIS — C54.1 ENDOMETRIAL CANCER (HCC): ICD-10-CM

## 2023-03-20 RX ADMIN — IOHEXOL 100 ML: 350 INJECTION, SOLUTION INTRAVENOUS at 16:01

## 2023-03-21 ENCOUNTER — APPOINTMENT (OUTPATIENT)
Dept: LAB | Facility: HOSPITAL | Age: 68
End: 2023-03-21

## 2023-03-21 ENCOUNTER — TELEPHONE (OUTPATIENT)
Dept: SURGICAL ONCOLOGY | Facility: CLINIC | Age: 68
End: 2023-03-21

## 2023-03-21 DIAGNOSIS — E87.6 HYPOKALEMIA: Primary | ICD-10-CM

## 2023-03-21 RX ORDER — POTASSIUM CHLORIDE 20 MEQ/1
TABLET, EXTENDED RELEASE ORAL
Qty: 16 TABLET | Refills: 0 | Status: SHIPPED | OUTPATIENT
Start: 2023-03-21

## 2023-03-22 ENCOUNTER — DOCUMENTATION (OUTPATIENT)
Dept: HEMATOLOGY ONCOLOGY | Facility: CLINIC | Age: 68
End: 2023-03-22

## 2023-03-22 NOTE — PROGRESS NOTES
Called [employer] & spoke to chhaya she sd that pt was approved for $25 co-pay w/a $25K limit     Called pt to give her this info & she thanked me for the help & the info    Faxed the following to [facility] for payment  Acct# [de-identified] [facility] 01/19/23 [id number]

## 2023-03-23 ENCOUNTER — HOSPITAL ENCOUNTER (OUTPATIENT)
Dept: INFUSION CENTER | Facility: HOSPITAL | Age: 68
Discharge: HOME | End: 2023-03-23
Attending: OBSTETRICS & GYNECOLOGY

## 2023-03-23 ENCOUNTER — OFFICE VISIT (OUTPATIENT)
Age: 68
End: 2023-03-23

## 2023-03-23 VITALS
HEIGHT: 63 IN | TEMPERATURE: 98.2 F | SYSTOLIC BLOOD PRESSURE: 122 MMHG | DIASTOLIC BLOOD PRESSURE: 64 MMHG | BODY MASS INDEX: 34.55 KG/M2 | WEIGHT: 195 LBS

## 2023-03-23 DIAGNOSIS — C54.1 ENDOMETRIAL CANCER (HCC): Primary | ICD-10-CM

## 2023-03-23 NOTE — ASSESSMENT & PLAN NOTE
56-year-old with stage IVb grade 3 endometrial cancer with liver, lung, retroperitoneal lymph node metastases who is receiving palliative Keytruda and lenvatinib  There is evidence of disease progression in retroperitoneal lymph nodes, lung, supraclavicular lymph nodes  I reviewed her CT chest abdomen pelvis images, CBC, CMP, urine protein to creatinine ratio  Vaginal bleeding was palliated effectively with palliative pelvic radiation therapy  Her tumor is ER positive  Her performance status is 0   1   Given disease progression, I discussed stopping her current regimen and considering treatment options including stopping treatment, hormone therapy, cytotoxic chemotherapy, referral to consider clinical trial   Based on the risks, benefits, response rates of the various treatment options, she would prefer to start cytotoxic chemotherapy  2   I discussed the risks and benefits of palliative chemotherapy with liposomal doxorubicin at 40 mg per metered squared every 28 days for at least 3 cycles followed by repeat CT imaging to assess disease response  She understands the risks and benefits of liposomal doxorubicin and was provided with written information regarding potential side effects  Consent for treatment as well as for potential blood transfusion was obtained by me in the office

## 2023-03-23 NOTE — PROGRESS NOTES
Assessment/Plan:    Problem List Items Addressed This Visit        Genitourinary    Endometrial cancer ([facility] ) - Primary     50-year-old with stage IVb grade 3 endometrial cancer with liver, lung, retroperitoneal lymph node metastases who is receiving palliative Keytruda and lenvatinib  There is evidence of disease progression in retroperitoneal lymph nodes, lung, supraclavicular lymph nodes  I reviewed her CT chest abdomen pelvis images, CBC, CMP, urine protein to creatinine ratio  Vaginal bleeding was palliated effectively with palliative pelvic radiation therapy  Her tumor is ER positive  Her performance status is 0   1   Given disease progression, I discussed stopping her current regimen and considering treatment options including stopping treatment, hormone therapy, cytotoxic chemotherapy, referral to consider clinical trial   Based on the risks, benefits, response rates of the various treatment options, she would prefer to start cytotoxic chemotherapy  2   I discussed the risks and benefits of palliative chemotherapy with liposomal doxorubicin at 40 mg per metered squared every 28 days for at least 3 cycles followed by repeat CT imaging to assess disease response  She understands the risks and benefits of liposomal doxorubicin and was provided with written information regarding potential side effects  Consent for treatment as well as for potential blood transfusion was obtained by me in the office  CHIEF COMPLAINT: Treatment discussion      Problem:  Cancer Staging   Endometrial cancer [facility])  Staging form: Corpus Uteri - Carcinoma, AJCC 8th Edition  - Clinical: FIGO Stage IVB (cM1) - Signed by Storm Nielsen MD on 2/17/2022        Previous therapy:  Oncology History   Endometrial [facility] ([facility] )   1/2022 Initial Diagnosis    [facility]omet[facility] ([facility] )     1/4/2022 Biopsy    [facility]    A  Cervical polyp biopsy:   High grade endometrial carcinoma    B   Endometrium biopsy:  High grade endometrial carcinoma     2/17/2022 -  Cancer Staged    Staging form: Corpus Uteri - Carcinoma, AJCC 8th Edition  - Clinical: FIGO Stage IVB (cM1) - Signed by Kayden Roque MD on 2/17/2022  Histologic grade (G): G3  Histologic grading system: 3 grade system       2/24/2022 -  Chemotherapy    Taxol 175 mg/m2 and carboplatin AUC 6 every 21 days  Taxol dose-reduced to 135 mg/m2 with cycle 2 due to arthralgias/myalgias  She has received 4 cycles in the neoadjuvant setting, and 2 following aborting debulking  Carboplatin dose-reduced to AUC 5 with cycle 5 d/t thrombocytopenia  3/11/2022 Genomic Testing    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, ER/OR positive, microsatellite stable  No other targeted therapy opportunities  5/31/2022 Surgery    Robotic-assisted BSO and lysis of adhesions  Hysterectomy aborted due to parametrial/cervical involvement and adhesive disease  9/15/2022 -  Chemotherapy    Keytruda 200 mg IV every 21 days with oral lenvatinib 10 mg daily (previously increased from 4 mg)  She is scheduled for cycle 8          - 2/24/2023 Radiation    Per patient, she completed 15 fractions to pelvis on 2/24/23  Patient ID: Emily Ling is a 75 y o  female  Who returns for treatment discussion  She is status post palliative radiation therapy to the pelvis  She notes significant improvement in vaginal bleeding  She has been receiving palliative Keytruda and lenvatinib  She received 9 cycles  CT of chest 7 pelvis 3/20/2023 revealed increasing pulmonary, retroperitoneal lymph node, supraclavicular lymph node metastases  Labs from 3/21/2023 reveal hypokalemia with a potassium of 2 9 mmol/L, anemia with hemoglobin 10 1 g/dL  Serum creatinine 1 08 mg/dL  Her urine protein to creatinine ratio was normal at 0 4  She is able to perform her activities of daily living  She has had minimal side effects from treatment        The following portions of the patient's history were reviewed and updated as appropriate: allergies, current medications, past family history, past medical history, past social history, past surgical history and problem list     Review of Systems   Constitutional: Negative for activity change and unexpected weight change  HENT: Negative  Eyes: Negative  Respiratory: Negative  Cardiovascular: Negative  Gastrointestinal: Negative for abdominal distention and abdominal pain  Endocrine: Negative  Genitourinary: Negative for pelvic pain and vaginal bleeding  Musculoskeletal: Negative  Skin: Negative  Allergic/Immunologic: Negative  Neurological: Negative  Hematological: Negative  Psychiatric/Behavioral: Negative  Current Outpatient Medications   Medication Sig Dispense Refill   • acetaminophen (TYLENOL) 650 mg CR tablet Take 1,300 mg by mouth in the morning and 1,300 mg before bedtime       • al mag oxide-diphenhydramine-lidocaine viscous (MAGIC MOUTHWASH) 1:1:1 suspension Swish and spit 10 mL every 4 (four) hours as needed for mouth pain or discomfort 90 mL 0   • amLODIPine (NORVASC) 10 mg tablet      • BD Pen Needle Yoana 2nd Gen 32G X 4 MM MISC 2 (two) times a day     • Contour Next Test test strip 2 (two) times a day Test     • fexofenadine (ALLEGRA) 180 MG tablet every 24 hours     • furosemide (LASIX) 40 mg tablet Take 1 tablet by mouth daily     • gabapentin (NEURONTIN) 300 mg capsule Take 300 mg by mouth 3 (three) times a day (Patient not taking: Reported on 1/20/2023)     • Lenvima, 10 MG Daily Dose, 10 MG CPPK TAKE 1 CAPSULE BY MOUTH DAILY 30 each 2   • lidocaine-prilocaine (EMLA) cream Apply to port site 30 min prior to labs/chemo 30 g 2   • lisinopril (ZESTRIL) 20 mg tablet Take 20 mg by mouth daily (Patient not taking: Reported on 1/20/2023)     • lisinopril (ZESTRIL) 40 mg tablet Take 1 tablet (40 mg total) by mouth daily 30 tablet 0   • LORazepam (ATIVAN) 0 5 mg tablet Take 0 5 mg by mouth in the morning and 0 5 mg in the evening  • magnesium oxide (MAG-OX) 400 mg Take 400 mg by mouth in the morning  (Patient not taking: Reported on 1/20/2023)     • metFORMIN (GLUCOPHAGE) 1000 MG tablet Take 1 tablet by mouth 2 (two) times a day with meals     • Microlet Lancets MISC 2 (two) times a day Test     • omeprazole (PriLOSEC) 20 mg delayed release capsule every 24 hours     • ondansetron (ZOFRAN) 8 mg tablet Take 1 tablet (8 mg total) by mouth every 8 (eight) hours as needed for nausea or vomiting 20 tablet 1   • potassium chloride (K-DUR,KLOR-CON) 20 mEq tablet Take 2 tablets PO BID x 3 days, then 1 tablet PO BID x 2 days  16 tablet 0   • Rosuvastatin Calcium 20 MG CPSP Take 1 tablet by mouth daily     • sertraline (ZOLOFT) 50 mg tablet Take 50 mg by mouth daily (Patient not taking: Reported on 1/20/2023)     • sitaGLIPtin (JANUVIA) 100 mg tablet Take 1 tablet by mouth daily     • Toujeo SoloStar 300 units/mL CONCENTRATED U-300 injection pen (1-unit dial) every 12 (twelve) hours 34 units in the am and 36 units at night       No current facility-administered medications for this visit  Objective:    Blood pressure 122/64, temperature 98 2 °F (36 8 °C), temperature source Tympanic, height 5' 2 6" (1 59 m), weight 88 5 kg (195 lb)  Body mass index is 34 99 kg/m²  Body surface area is 1 9 meters squared  Physical Exam  Vitals reviewed  Constitutional:       General: She is not in acute distress  Appearance: Normal appearance  She is not ill-appearing  HENT:      Head: Normocephalic and atraumatic  Mouth/Throat:      Mouth: Mucous membranes are moist    Eyes:      General: No scleral icterus  Right eye: No discharge  Left eye: No discharge  Conjunctiva/sclera: Conjunctivae normal    Pulmonary:      Effort: Pulmonary effort is normal    Musculoskeletal:      Right lower leg: No edema  Left lower leg: No edema  Skin:     General: Skin is warm and dry        Coloration: Skin is not jaundiced  Findings: No rash  Neurological:      General: No focal deficit present  Mental Status: She is alert and oriented to person, place, and time  Cranial Nerves: No cranial nerve deficit  Motor: No weakness  Gait: Gait normal    Psychiatric:         Mood and Affect: Mood normal          Behavior: Behavior normal          Thought Content:  Thought content normal          Judgment: Judgment normal          Lab Results   Component Value Date     3 3 03/21/2023     Lab Results   Component Value Date    K 2 9 (L) 03/21/2023     03/21/2023    CO2 26 03/21/2023    BUN 19 03/21/2023    CREATININE 1 08 03/21/2023    GLUF 255 (H) 03/13/2023    CALCIUM 9 9 03/21/2023    CORRECTEDCA 10 6 (H) 02/27/2023    AST 15 03/21/2023    ALT 14 03/21/2023    ALKPHOS 73 03/21/2023    EGFR 52 03/21/2023     Lab Results   Component Value Date    WBC 5 64 03/21/2023    HGB 10 1 (L) 03/21/2023    HCT 34 1 (L) 03/21/2023    MCV 90 03/21/2023     03/21/2023     Lab Results   Component Value Date    NEUTROABS 3 31 03/13/2023        Trend:  Lab Results   Component Value Date     3 3 03/21/2023     7 1 02/27/2023     4 1 01/16/2023     4 3 12/27/2022     3 3 12/05/2022     6 0 11/14/2022     4 8 10/24/2022     7 9 10/03/2022     7 3 09/12/2022     12 6 08/08/2022     14 3 07/11/2022     12 2 05/10/2022     13 6 04/25/2022     10 9 04/04/2022     11 4 03/15/2022     10 9 02/28/2022

## 2023-03-27 ENCOUNTER — APPOINTMENT (OUTPATIENT)
Dept: LAB | Facility: HOSPITAL | Age: 68
End: 2023-03-27

## 2023-03-27 ENCOUNTER — TELEMEDICINE (OUTPATIENT)
Dept: RADIATION ONCOLOGY | Facility: HOSPITAL | Age: 68
End: 2023-03-27

## 2023-03-27 DIAGNOSIS — C54.1 ENDOMETRIAL CANCER (HCC): Primary | ICD-10-CM

## 2023-03-27 LAB
ALBUMIN SERPL BCP-MCNC: 4.1 G/DL (ref 3.5–5)
ALP SERPL-CCNC: 74 U/L (ref 34–104)
ALT SERPL W P-5'-P-CCNC: 10 U/L (ref 7–52)
ANION GAP SERPL CALCULATED.3IONS-SCNC: 10 MMOL/L (ref 4–13)
AST SERPL W P-5'-P-CCNC: 11 U/L (ref 13–39)
BASOPHILS # BLD AUTO: 0.03 THOUSANDS/ÂΜL (ref 0–0.1)
BASOPHILS NFR BLD AUTO: 1 % (ref 0–1)
BILIRUB SERPL-MCNC: 0.55 MG/DL (ref 0.2–1)
BUN SERPL-MCNC: 35 MG/DL (ref 5–25)
CALCIUM SERPL-MCNC: 10.4 MG/DL (ref 8.4–10.2)
CHLORIDE SERPL-SCNC: 105 MMOL/L (ref 96–108)
CO2 SERPL-SCNC: 23 MMOL/L (ref 21–32)
CREAT SERPL-MCNC: 1.32 MG/DL (ref 0.6–1.3)
EOSINOPHIL # BLD AUTO: 0.19 THOUSAND/ÂΜL (ref 0–0.61)
EOSINOPHIL NFR BLD AUTO: 3 % (ref 0–6)
ERYTHROCYTE [DISTWIDTH] IN BLOOD BY AUTOMATED COUNT: 19.5 % (ref 11.6–15.1)
GFR SERPL CREATININE-BSD FRML MDRD: 41 ML/MIN/1.73SQ M
GLUCOSE SERPL-MCNC: 117 MG/DL (ref 65–140)
HCT VFR BLD AUTO: 33.3 % (ref 34.8–46.1)
HGB BLD-MCNC: 9.4 G/DL (ref 11.5–15.4)
IMM GRANULOCYTES # BLD AUTO: 0.09 THOUSAND/UL (ref 0–0.2)
IMM GRANULOCYTES NFR BLD AUTO: 2 % (ref 0–2)
LYMPHOCYTES # BLD AUTO: 0.76 THOUSANDS/ÂΜL (ref 0.6–4.47)
LYMPHOCYTES NFR BLD AUTO: 12 % (ref 14–44)
MAGNESIUM SERPL-MCNC: 2.1 MG/DL (ref 1.9–2.7)
MCH RBC QN AUTO: 26.2 PG (ref 26.8–34.3)
MCHC RBC AUTO-ENTMCNC: 28.2 G/DL (ref 31.4–37.4)
MCV RBC AUTO: 93 FL (ref 82–98)
MONOCYTES # BLD AUTO: 1.04 THOUSAND/ÂΜL (ref 0.17–1.22)
MONOCYTES NFR BLD AUTO: 17 % (ref 4–12)
NEUTROPHILS # BLD AUTO: 4.07 THOUSANDS/ÂΜL (ref 1.85–7.62)
NEUTS SEG NFR BLD AUTO: 65 % (ref 43–75)
NRBC BLD AUTO-RTO: 2 /100 WBCS
PLATELET # BLD AUTO: 169 THOUSANDS/UL (ref 149–390)
PMV BLD AUTO: 10.6 FL (ref 8.9–12.7)
POTASSIUM SERPL-SCNC: 4.7 MMOL/L (ref 3.5–5.3)
PROT SERPL-MCNC: 6.6 G/DL (ref 6.4–8.4)
RBC # BLD AUTO: 3.59 MILLION/UL (ref 3.81–5.12)
SODIUM SERPL-SCNC: 138 MMOL/L (ref 135–147)
WBC # BLD AUTO: 6.18 THOUSAND/UL (ref 4.31–10.16)

## 2023-03-27 NOTE — PROGRESS NOTES
Follow-up - Radiation Oncology   MistiBryantgold Flower 1955 75 y o  female [id number]      History of Present Illness   Cancer Staging   Endometrial cancer [facility])  Staging form: Corpus Uteri - Carcinoma, AJCC 8th Edition  - Clinical: FIGO Stage IVB (cM1) - Signed by Vero Vasquez MD on 2/17/2022  Histologic grade (G): G3  Histologic grading system: 3 grade system      Ms Ben Winn is a 75year old woman with Stage IVB endometrial cancer with metastases to the liver, lungs, and LNs  On 2/24/23 she completed a course of palliative RT to the uterus to a dose of 3750 cGy in 15 fractions  She returns today for telephone follow-up  Interval History:  The patient was last seen at the completion of RT  She tolerated treatment well overall with minimal side effects  3/20/23 CT chest abdomen pelvis  1  Worsening of the metastatic disease within the lungs as detailed above  2   Worsening lymphadenopathy in the left supraclavicular soft tissues and mild worsening of the subcarinal and possibly hilar lymph nodes as compared to the prior exam   3   Mild interval worsening of the retroperitoneum and pelvic lymphadenopathy with new index enlarged lymph node adjacent to the left common iliac artery  4   Innumerable metastatic lesions throughout the liver, difficult to index and compared to the prior exam   5   Possible lytic metastatic disease in the left iliac wing  Consider correlation with pelvic MRI  6   Ill-defined heterogeneous mass involving the uterus appears to have decreased in size as compared to the prior study  3/23/23 Gyn Onc, Dr Eugenia Haywood   Pt has been receiving palliative Keytruda and lenvatinib  There is evidence of disease progression in retroperitoneal lymph nodes, lung, supraclavicular lymph nodes on recent CT  Vaginal bleeding was palliated effectively with palliative pelvic radiation therapy      PLAN: Given disease progression, discussed stopping current regimen and considering treatment options including stopping treatment, hormone therapy, cytotoxic chemotherapy, referral to consider clinical trial     She prefers to start cytotoxic chemotherapy  Plan for palliative chemotherapy with liposomal doxorubicin at 40 mg per metered squared every 28 days for at least 3 cycles followed by repeat CT imaging to assess disease response  Currently she is doing well overall  She did have some fatigue with treatment but this has resolved  She has had resolution of her vaginal bleeding and her prior intermittent pelvic pain  She did not have any diarrhea or loose stool with treatment, no dysuria  Upcoming:  3/30/23 Infusion  4/20/23 Gyn [facility] Information   Oncology History   Endometrial cancer ([facility] [address] )   1/2022 Initial Diagnosis    Endometrial cancer ([facility] [address] )     1/4/2022 Biopsy    [facility]    A  Cervical polyp biopsy:   High grade endometrial carcinoma    B  Endometrium biopsy:  High grade endometrial carcinoma     2/17/2022 -  Cancer Staged    Staging form: Corpus Uteri - Carcinoma, AJCC 8th Edition  - Clinical: FIGO Stage IVB (cM1) - Signed by Leodan Forrester MD on 2/17/2022  Histologic grade (G): G3  Histologic grading system: 3 grade system       2/24/2022 -  Chemotherapy    Taxol 175 mg/m2 and carboplatin AUC 6 every 21 days  Taxol dose-reduced to 135 mg/m2 with cycle 2 due to arthralgias/myalgias  She has received 4 cycles in the neoadjuvant setting, and 2 following aborting debulking  Carboplatin dose-reduced to AUC 5 with cycle 5 d/t thrombocytopenia  3/11/2022 Genomic Testing    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, ER/NC positive, microsatellite stable  No other targeted therapy opportunities  5/31/2022 Surgery    Robotic-assisted BSO and lysis of adhesions  Hysterectomy aborted due to parametrial/cervical involvement and adhesive disease       9/15/2022 -  Chemotherapy    Keytruda 200 mg IV every 21 days with oral lenvatinib 10 mg daily (previously increased from 4 mg)   She is scheduled for cycle 8          - 2/24/2023 Radiation    Per patient, she completed 15 fractions to pelvis on 2/24/23 2/6/2023 - 2/24/2023 Radiation    Course: C1    Plan ID Energy Fractions Dose per Fraction (cGy) Dose Correction (cGy) Total Dose Delivered (cGy) Elapsed Days   Pelvis 10X 15 / 15 250 0 3,750 18         3/30/2023 -  Chemotherapy    DOXOrubicin liposomal (DOXIL) chemo infusion, 40 mg/m2 = 76 mg, Intravenous, Once, 0 of 4 cycles         Past Medical History:   Diagnosis Date   • Anxiety    • Arthritis    • Back pain    • Cancer ([facility] )    • Depression    • Diabetes mellitus ([facility] )    • Disease of thyroid gland    • Endometrial cancer ([facility] )    • GERD (gastroesophageal reflux disease)    • Hyperlipidemia associated with type 2 diabetes mellitus ([facility] )    • Hypertension    • Morbid obesity with BMI of 40 0-44 9, adult ([facility] )    • Type 2 diabetes mellitus without complication ([facility] )    • Urinary incontinence    • Wears glasses      Past Surgical History:   Procedure Laterality Date   • ABDOMINAL ADHESION SURGERY N/A 5/31/2022    Procedure: Otilio Jackson;  Surgeon: Dave Gamino MD;  Location: [facility];  Service: Gynecology Oncology   • CATARACT EXTRACTION W/ INTRAOCULAR LENS IMPLANT Bilateral    • CHOLECYSTECTOMY OPEN     • CYSTOSCOPY N/A 5/31/2022    Procedure: CYSTOSCOPY;  Surgeon: Dave Gamino MD;  Location: [facility];  Service: Gynecology Oncology   • IR PORT PLACEMENT  03/07/2022   • AR LAPS TOTAL HYSTERECT 250 GM/< W/RMVL TUBE/OVARY N/A 5/31/2022    Procedure: ROBOTIC ASSISTED TOTAL LAPAROSCOPIC HYSTERECTOMY, RIGHT SALPINGO-OOPHORECTOMY, LEFT SALPINGECTOMY,;  Surgeon: Dave Gamino MD;  Location: [facility];  Service: Gynecology Oncology   • SALPINGOOPHORECTOMY N/A     only had one removed and not sure which   • TONSILLECTOMY         Social History   Social History     Substance and Sexual Activity Alcohol Use Not Currently     Social History     Substance and Sexual Activity   Drug Use Never     Social History     Tobacco Use   Smoking Status Never   Smokeless Tobacco Never         Meds/Allergies     Current Outpatient Medications:   •  acetaminophen (TYLENOL) 650 mg CR tablet, Take 1,300 mg by mouth in the morning and 1,300 mg before bedtime  , Disp: , Rfl:   •  al mag oxide-diphenhydramine-lidocaine viscous (MAGIC MOUTHWASH) 1:1:1 suspension, Swish and spit 10 mL every 4 (four) hours as needed for mouth pain or discomfort, Disp: 90 mL, Rfl: 0  •  amLODIPine (NORVASC) 10 mg tablet, , Disp: , Rfl:   •  BD Pen Needle Yoana 2nd Gen 32G X 4 MM MISC, 2 (two) times a day, Disp: , Rfl:   •  Contour Next Test test strip, 2 (two) times a day Test, Disp: , Rfl:   •  fexofenadine (ALLEGRA) 180 MG tablet, every 24 hours, Disp: , Rfl:   •  furosemide (LASIX) 40 mg tablet, Take 1 tablet by mouth daily, Disp: , Rfl:   •  gabapentin (NEURONTIN) 300 mg capsule, Take 300 mg by mouth 3 (three) times a day (Patient not taking: Reported on 1/20/2023), Disp: , Rfl:   •  Lenvima, 10 MG Daily Dose, 10 MG CPPK, TAKE 1 CAPSULE BY MOUTH DAILY, Disp: 30 each, Rfl: 2  •  lidocaine-prilocaine (EMLA) cream, Apply to port site 30 min prior to labs/chemo, Disp: 30 g, Rfl: 2  •  lisinopril (ZESTRIL) 20 mg tablet, Take 20 mg by mouth daily (Patient not taking: Reported on 1/20/2023), Disp: , Rfl:   •  lisinopril (ZESTRIL) 40 mg tablet, Take 1 tablet (40 mg total) by mouth daily, Disp: 30 tablet, Rfl: 0  •  LORazepam (ATIVAN) 0 5 mg tablet, Take 0 5 mg by mouth in the morning and 0 5 mg in the evening , Disp: , Rfl:   •  magnesium oxide (MAG-OX) 400 mg, Take 400 mg by mouth in the morning   (Patient not taking: Reported on 1/20/2023), Disp: , Rfl:   •  metFORMIN (GLUCOPHAGE) 1000 MG tablet, Take 1 tablet by mouth 2 (two) times a day with meals, Disp: , Rfl:   •  Microlet Lancets MISC, 2 (two) times a day Test, Disp: , Rfl:   •  omeprazole (PriLOSEC) 20 mg delayed release capsule, every 24 hours, Disp: , Rfl:   •  ondansetron (ZOFRAN) 8 mg tablet, Take 1 tablet (8 mg total) by mouth every 8 (eight) hours as needed for nausea or vomiting, Disp: 20 tablet, Rfl: 1  •  potassium chloride (K-DUR,KLOR-CON) 20 mEq tablet, Take 2 tablets PO BID x 3 days, then 1 tablet PO BID x 2 days  , Disp: 16 tablet, Rfl: 0  •  Rosuvastatin Calcium 20 MG CPSP, Take 1 tablet by mouth daily, Disp: , Rfl:   •  sertraline (ZOLOFT) 50 mg tablet, Take 50 mg by mouth daily (Patient not taking: Reported on 1/20/2023), Disp: , Rfl:   •  sitaGLIPtin (JANUVIA) 100 mg tablet, Take 1 tablet by mouth daily, Disp: , Rfl:   •  Toujeo SoloStar 300 units/mL CONCENTRATED U-300 injection pen (1-unit dial), every 12 (twelve) hours 34 units in the am and 36 units at night, Disp: , Rfl:   Allergies   Allergen Reactions   • Empagliflozin Other (See Comments)     Yeast infection   • Exenatide Nausea Only   • Glipizide Other (See Comments)     hypoglycemic   • Repaglinide Nausea Only             OBJECTIVE:   There were no vitals taken for this visit  Telephone visit   No exam       RESULTS    Lab Results:   Recent Results (from the past 672 hour(s))   CBC and differential    Collection Time: 03/06/23 11:58 AM   Result Value Ref Range    WBC 5 06 4 31 - 10 16 Thousand/uL    RBC 3 59 (L) 3 81 - 5 12 Million/uL    Hemoglobin 9 7 (L) 11 5 - 15 4 g/dL    Hematocrit 32 8 (L) 34 8 - 46 1 %    MCV 91 82 - 98 fL    MCH 27 0 26 8 - 34 3 pg    MCHC 29 6 (L) 31 4 - 37 4 g/dL    RDW 19 6 (H) 11 6 - 15 1 %    MPV 9 7 8 9 - 12 7 fL    Platelets 773 500 - 789 Thousands/uL    nRBC 1 /100 WBCs    Neutrophils Relative 68 43 - 75 %    Immat GRANS % 1 0 - 2 %    Lymphocytes Relative 13 (L) 14 - 44 %    Monocytes Relative 14 (H) 4 - 12 %    Eosinophils Relative 4 0 - 6 %    Basophils Relative 0 0 - 1 %    Neutrophils Absolute 3 37 1 85 - 7 62 Thousands/µL    Immature Grans Absolute 0 06 0 00 - 0 20 Thousand/uL Lymphocytes Absolute 0 67 0 60 - 4 47 Thousands/µL    Monocytes Absolute 0 73 0 17 - 1 22 Thousand/µL    Eosinophils Absolute 0 21 0 00 - 0 61 Thousand/µL    Basophils Absolute 0 02 0 00 - 0 10 Thousands/µL   Comprehensive metabolic panel    Collection Time: 03/06/23 11:58 AM   Result Value Ref Range    Sodium 141 135 - 147 mmol/L    Potassium 3 8 3 5 - 5 3 mmol/L    Chloride 103 96 - 108 mmol/L    CO2 27 21 - 32 mmol/L    ANION GAP 11 4 - 13 mmol/L    BUN 17 5 - 25 mg/dL    Creatinine 1 01 0 60 - 1 30 mg/dL    Glucose, Fasting 167 (H) 65 - 99 mg/dL    Calcium 8 9 8 4 - 10 2 mg/dL    AST 17 13 - 39 U/L    ALT 14 7 - 52 U/L    Alkaline Phosphatase 73 34 - 104 U/L    Total Protein 6 6 6 4 - 8 4 g/dL    Albumin 4 0 3 5 - 5 0 g/dL    Total Bilirubin 0 69 0 20 - 1 00 mg/dL    eGFR 57 ml/min/1 73sq m   Magnesium    Collection Time: 03/06/23 11:58 AM   Result Value Ref Range    Magnesium 1 7 (L) 1 9 - 2 7 mg/dL   CBC and differential    Collection Time: 03/13/23 11:10 AM   Result Value Ref Range    WBC 4 57 4 31 - 10 16 Thousand/uL    RBC 3 62 (L) 3 81 - 5 12 Million/uL    Hemoglobin 9 8 (L) 11 5 - 15 4 g/dL    Hematocrit 33 4 (L) 34 8 - 46 1 %    MCV 92 82 - 98 fL    MCH 27 1 26 8 - 34 3 pg    MCHC 29 3 (L) 31 4 - 37 4 g/dL    RDW 19 1 (H) 11 6 - 15 1 %    MPV 10 0 8 9 - 12 7 fL    Platelets 746 236 - 112 Thousands/uL    nRBC 1 /100 WBCs    Neutrophils Relative 73 43 - 75 %    Immat GRANS % 2 0 - 2 %    Lymphocytes Relative 11 (L) 14 - 44 %    Monocytes Relative 11 4 - 12 %    Eosinophils Relative 3 0 - 6 %    Basophils Relative 0 0 - 1 %    Neutrophils Absolute 3 31 1 85 - 7 62 Thousands/µL    Immature Grans Absolute 0 07 0 00 - 0 20 Thousand/uL    Lymphocytes Absolute 0 52 (L) 0 60 - 4 47 Thousands/µL    Monocytes Absolute 0 52 0 17 - 1 22 Thousand/µL    Eosinophils Absolute 0 13 0 00 - 0 61 Thousand/µL    Basophils Absolute 0 02 0 00 - 0 10 Thousands/µL   Comprehensive metabolic panel    Collection Time: 03/13/23 11:10 AM   Result Value Ref Range    Sodium 135 135 - 147 mmol/L    Potassium 3 8 3 5 - 5 3 mmol/L    Chloride 103 96 - 108 mmol/L    CO2 24 21 - 32 mmol/L    ANION GAP 8 4 - 13 mmol/L    BUN 21 5 - 25 mg/dL    Creatinine 1 06 0 60 - 1 30 mg/dL    Glucose, Fasting 255 (H) 65 - 99 mg/dL    Calcium 9 8 8 3 - 10 1 mg/dL    AST 20 5 - 45 U/L    ALT 22 12 - 78 U/L    Alkaline Phosphatase 75 46 - 116 U/L    Total Protein 6 5 6 4 - 8 4 g/dL    Albumin 3 5 3 5 - 5 0 g/dL    Total Bilirubin 0 62 0 20 - 1 00 mg/dL    eGFR 54 ml/min/1 73sq m   Magnesium    Collection Time: 03/13/23 11:15 AM   Result Value Ref Range    Magnesium 1 7 1 6 - 2 6 mg/dL   TSH, 3rd generation with Free T4 reflex    Collection Time: 03/21/23 11:28 AM   Result Value Ref Range    TSH 3RD GENERATON 3 547 0 450 - 4 500 uIU/mL   Amylase    Collection Time: 03/21/23 11:28 AM   Result Value Ref Range    Amylase 33 29 - 103 IU/L   Lipase    Collection Time: 03/21/23 11:28 AM   Result Value Ref Range    Lipase 23 11 - 82 u/L       Collection Time: 03/21/23 11:28 AM   Result Value Ref Range     3 3 0 0 - 30 0 U/mL   Protein / creatinine ratio, urine    Collection Time: 03/21/23 11:28 AM   Result Value Ref Range    Creatinine, Ur 194 5 mg/dL    Protein Urine Random 80 mg/dL    Prot/Creat Ratio, Ur 0 41 (H) 0 00 - 0 10   CBC and differential    Collection Time: 03/21/23 11:28 AM   Result Value Ref Range    WBC 5 64 4 31 - 10 16 Thousand/uL    RBC 3 81 3 81 - 5 12 Million/uL    Hemoglobin 10 1 (L) 11 5 - 15 4 g/dL    Hematocrit 34 1 (L) 34 8 - 46 1 %    MCV 90 82 - 98 fL    MCH 26 5 (L) 26 8 - 34 3 pg    MCHC 29 6 (L) 31 4 - 37 4 g/dL    RDW 19 0 (H) 11 6 - 15 1 %    MPV 10 4 8 9 - 12 7 fL    Platelets 024 266 - 466 Thousands/uL   Comprehensive metabolic panel    Collection Time: 03/21/23 11:28 AM   Result Value Ref Range    Sodium 139 135 - 147 mmol/L    Potassium 2 9 (L) 3 5 - 5 3 mmol/L    Chloride 100 96 - 108 mmol/L    CO2 26 21 - 32 mmol/L    ANION GAP 13 4 - 13 mmol/L    BUN 19 5 - 25 mg/dL    Creatinine 1 08 0 60 - 1 30 mg/dL    Glucose 187 (H) 65 - 140 mg/dL    Calcium 9 9 8 4 - 10 2 mg/dL    AST 15 13 - 39 U/L    ALT 14 7 - 52 U/L    Alkaline Phosphatase 73 34 - 104 U/L    Total Protein 6 6 6 4 - 8 4 g/dL    Albumin 4 0 3 5 - 5 0 g/dL    Total Bilirubin 0 69 0 20 - 1 00 mg/dL    eGFR 52 ml/min/1 73sq m   Magnesium    Collection Time: 03/21/23 11:28 AM   Result Value Ref Range    Magnesium 1 6 (L) 1 9 - 2 7 mg/dL   Manual Differential(PHLEBS Do Not Order)    Collection Time: 03/21/23 11:28 AM   Result Value Ref Range    Segmented % 70 43 - 75 %    Bands % 3 0 - 8 %    Lymphocytes % 14 14 - 44 %    Monocytes % 10 4 - 12 %    Eosinophils, % 2 0 - 6 %    Basophils % 0 0 - 1 %    Atypical Lymphocytes % 1 (H) <=0 %    Absolute Neutrophils 4 12 1 85 - 7 62 Thousand/uL    Lymphocytes Absolute 0 79 0 60 - 4 47 Thousand/uL    Monocytes Absolute 0 56 0 00 - 1 22 Thousand/uL    Eosinophils Absolute 0 11 0 00 - 0 40 Thousand/uL    Basophils Absolute 0 00 0 00 - 0 10 Thousand/uL    Total Counted      nRBC 1 0 - 2 /100 WBC    RBC Morphology Present     Anisocytosis Present     Ovalocytes Present     Tear Drop Cells Present     Platelet Estimate Borderline (A) Adequate   CBC and differential    Collection Time: 03/27/23 11:31 AM   Result Value Ref Range    WBC 6 18 4 31 - 10 16 Thousand/uL    RBC 3 59 (L) 3 81 - 5 12 Million/uL    Hemoglobin 9 4 (L) 11 5 - 15 4 g/dL    Hematocrit 33 3 (L) 34 8 - 46 1 %    MCV 93 82 - 98 fL    MCH 26 2 (L) 26 8 - 34 3 pg    MCHC 28 2 (L) 31 4 - 37 4 g/dL    RDW 19 5 (H) 11 6 - 15 1 %    MPV 10 6 8 9 - 12 7 fL    Platelets 813 160 - 737 Thousands/uL    nRBC 2 /100 WBCs    Neutrophils Relative 65 43 - 75 %    Immat GRANS % 2 0 - 2 %    Lymphocytes Relative 12 (L) 14 - 44 %    Monocytes Relative 17 (H) 4 - 12 %    Eosinophils Relative 3 0 - 6 %    Basophils Relative 1 0 - 1 %    Neutrophils Absolute 4 07 1 85 - 7 62 Thousands/µL    Immature Grans Absolute 0 09 0 00 - 0 20 Thousand/uL    Lymphocytes Absolute 0 76 0 60 - 4 47 Thousands/µL    Monocytes Absolute 1 04 0 17 - 1 22 Thousand/µL    Eosinophils Absolute 0 19 0 00 - 0 61 Thousand/µL    Basophils Absolute 0 03 0 00 - 0 10 Thousands/µL   Comprehensive metabolic panel    Collection Time: 03/27/23 11:31 AM   Result Value Ref Range    Sodium 138 135 - 147 mmol/L    Potassium 4 7 3 5 - 5 3 mmol/L    Chloride 105 96 - 108 mmol/L    CO2 23 21 - 32 mmol/L    ANION GAP 10 4 - 13 mmol/L    BUN 35 (H) 5 - 25 mg/dL    Creatinine 1 32 (H) 0 60 - 1 30 mg/dL    Glucose 117 65 - 140 mg/dL    Calcium 10 4 (H) 8 4 - 10 2 mg/dL    AST 11 (L) 13 - 39 U/L    ALT 10 7 - 52 U/L    Alkaline Phosphatase 74 34 - 104 U/L    Total Protein 6 6 6 4 - 8 4 g/dL    Albumin 4 1 3 5 - 5 0 g/dL    Total Bilirubin 0 55 0 20 - 1 00 mg/dL    eGFR 41 ml/min/1 73sq m   Magnesium    Collection Time: 03/27/23 11:31 AM   Result Value Ref Range    Magnesium 2 1 1 9 - 2 7 mg/dL       Imaging Studies:CT chest abdomen pelvis w contrast    Result Date: 3/21/2023  Narrative: CT CHEST, ABDOMEN AND PELVIS WITH IV CONTRAST INDICATION:   C54 1: Malignant neoplasm of endometrium  COMPARISON:  1/10/2023, 11/3/2022 TECHNIQUE: CT examination of the chest, abdomen and pelvis was performed  In addition to portal venous phase postcontrast scanning through the abdomen and pelvis, delayed phase postcontrast scanning was performed through the upper abdominal viscera  Axial, sagittal, and coronal 2D reformatted images were created from the source data and submitted for interpretation  Radiation dose length product (DLP) for this visit:  1461 23 mGy-cm   This examination, like all CT scans performed in the [facility], was performed utilizing techniques to minimize radiation dose exposure, including the use of iterative reconstruction and automated exposure control   IV Contrast:  100 mL of iohexol (OMNIPAQUE) Enteric Contrast: Enteric contrast was administered  FINDINGS: CHEST LUNGS:  Innumerable noncalcified pulmonary nodules bilaterally felt to be compatible with metastatic disease  Index nodule in the medial right lower lobe on 3/122 measuring 1 4 x 2 cm, may correspond to the nodule identified on the prior study on 3/64 which measured 0 4 cm  Dependent index nodule in the medial left lower lobe on 3/122 measuring 1 x 1 7 cm, not identified on the prior exam   No tracheal or endobronchial lesion  PLEURA:  Unremarkable  HEART/GREAT VESSELS: Moderate coronary vascular calcifications  No thoracic aortic aneurysm  MEDIASTINUM AND EDISON:  Multiple enlarged mediastinal and hilar lymph nodes  Index precarinal lymph node measuring 1 5 x 1 2 cm  Subcarinal lymph node measuring 1 2 x 2 5 cm, previously measured 1 x 2 3 cm  CHEST WALL AND LOWER NECK:  Enlarged left-sided supraclavicular lymph nodes with index lymph node on 2/7 measuring 1 4 x 2 4 cm, previously measured 1 4 x 1 6 cm  Lymph node abutting the left internal jugular vein measuring 1 8 x 2 cm on 2/2  Partially  imaged right-sided central line terminating in the superior atriocaval junction  ABDOMEN LIVER/BILIARY TREE:  Intraluminal hypodensities throughout the liver felt to be compatible with metastatic disease  There may be slight nodularity of the liver contour  GALLBLADDER:  Gallbladder is surgically absent  SPLEEN:  Nonspecific heterogeneity of the liver  PANCREAS:  Unremarkable  ADRENAL GLANDS:  Unremarkable  KIDNEYS/URETERS:  Unremarkable  No hydronephrosis  STOMACH AND BOWEL:  There is colonic diverticulosis without evidence of acute diverticulitis  APPENDIX:  No findings to suggest appendicitis  ABDOMINOPELVIC CAVITY:  No ascites or pneumoperitoneum  Abdominal and pelvic lymphadenopathy with index lymph nodes as discussed: Left periaortic lymph node measuring 2 1 x 3 cm on 2/129, previously measured 1 8 x 2 9 cm   Aortic caval lymph node measuring 2 x 2 4 cm on 2/152, previously measured 1 8 x 2 3 cm  Lymph node deep to the left psoas muscle adjacent to the common iliac artery measuring 1 2 x 2 cm on 2/171, not identified on prior study  Right pelvic sidewall lymph node measuring 1 9 x 2 cm, 2/188, previously measured 1 9 x 2 1 cm  Additional retroperitoneal and portal caval lymph nodes are present but not indexed  VESSELS:  Atherosclerotic changes are present  No evidence of aneurysm  PELVIS REPRODUCTIVE ORGANS:  Heterogeneous appearance of the uterus with ill-defined mass estimated to measure 6 9 x 6 1 x 4 1 cm in greatest dimensions  Adnexa are not well visualized  URINARY BLADDER:  Under distention diminishing diagnostic assessment  ABDOMINAL WALL/INGUINAL REGIONS:  Unremarkable  OSSEOUS STRUCTURES:  Lucency in the left iliac wing on 2/182 measuring 0 9 cm without evidence of cortical breakthrough  Possible lucency within the left iliac wing on 2/177 measuring 0 7 cm  Unchanged sclerotic density in the right scapula/base of the  coracoid process and possible sclerotic density in the proximal right humeral head  Impression: 1  Worsening of the metastatic disease within the lungs as detailed above  2   Worsening lymphadenopathy in the left supraclavicular soft tissues and mild worsening of the subcarinal and possibly hilar lymph nodes as compared to the prior exam  3   Mild interval worsening of the retroperitoneum and pelvic lymphadenopathy with new index enlarged lymph node adjacent to the left common iliac artery  4   Innumerable metastatic lesions throughout the liver, difficult to index and compared to the prior exam  5   Possible lytic metastatic disease in the left iliac wing  Consider correlation with pelvic MRI  6   Ill-defined heterogeneous mass involving the uterus appears to have decreased in size as compared to the prior study  This study was marked significant in Epic   Workstation performed: [id number]       Assessment/Plan:  No orders of the defined types were placed in "this encounter  Approximately 1 month following completion of palliative RT, the patient is doing fair overall  She feels well overall and has had resolution of her prior vaginal bleeding and pelvic discomfort; likewise she has recovered from her prior treatment without any apparent ill effects  She has unfortunately developed progression of disease and Dr Lainey Dudley has recommended a change in chemotherapy to liposomal doxorubicin  This is scheduled to begin on 3/30/23  I will remain available going forward should any further radiation related questions arise  Kam Hawkins MD  0/38/7271,4:45 PM    Portions of the record may have been created with voice recognition software   Occasional wrong word or \"sound a like\" substitutions may have occurred due to the inherent limitations of voice recognition software   Read the chart carefully and recognize, using context, where substitutions have occurred        "

## 2023-03-30 ENCOUNTER — HOSPITAL ENCOUNTER (OUTPATIENT)
Dept: INFUSION CENTER | Facility: HOSPITAL | Age: 68
Discharge: HOME | End: 2023-03-30
Attending: OBSTETRICS & GYNECOLOGY

## 2023-03-30 VITALS
HEIGHT: 63 IN | HEART RATE: 95 BPM | RESPIRATION RATE: 18 BRPM | WEIGHT: 195.99 LBS | SYSTOLIC BLOOD PRESSURE: 102 MMHG | TEMPERATURE: 96.4 F | DIASTOLIC BLOOD PRESSURE: 60 MMHG | BODY MASS INDEX: 34.73 KG/M2 | OXYGEN SATURATION: 98 %

## 2023-03-30 DIAGNOSIS — C54.1 ENDOMETRIAL CANCER (HCC): Primary | ICD-10-CM

## 2023-03-30 RX ORDER — DEXTROSE MONOHYDRATE 50 MG/ML
20 INJECTION, SOLUTION INTRAVENOUS ONCE
Status: COMPLETED | OUTPATIENT
Start: 2023-03-30 | End: 2023-03-30

## 2023-03-30 RX ADMIN — DEXTROSE MONOHYDRATE 20 ML/HR: 50 INJECTION, SOLUTION INTRAVENOUS at 11:17

## 2023-03-30 RX ADMIN — SODIUM CHLORIDE 500 ML: 9 INJECTION, SOLUTION INTRAVENOUS at 10:12

## 2023-03-30 RX ADMIN — DOXORUBICIN HYDROCHLORIDE 76 MG: 2 INJECTABLE, LIPOSOMAL INTRAVENOUS at 11:35

## 2023-03-30 RX ADMIN — DEXAMETHASONE SODIUM PHOSPHATE 10 MG: 10 INJECTION, SOLUTION INTRAMUSCULAR; INTRAVENOUS at 10:49

## 2023-03-30 NOTE — PROGRESS NOTES
Pt here for chemo tolerated well no adverse reactions AVS provided and pt left ambulatory with steady gait

## 2023-04-03 ENCOUNTER — APPOINTMENT (OUTPATIENT)
Dept: LAB | Facility: HOSPITAL | Age: 68
End: 2023-04-03

## 2023-04-13 PROBLEM — I10 HTN (HYPERTENSION): Status: ACTIVE | Noted: 2022-12-01

## 2023-04-13 PROBLEM — H34.239 BRAO (BRANCH RETINAL ARTERY OCCLUSION): Status: ACTIVE | Noted: 2023-04-13

## 2023-04-13 LAB
LEFT EYE DIABETIC RETINOPATHY: NORMAL
RIGHT EYE DIABETIC RETINOPATHY: NORMAL

## 2023-04-14 PROBLEM — D61.818 PANCYTOPENIA (HCC): Status: ACTIVE | Noted: 2022-07-14

## 2023-04-15 PROBLEM — I63.9 CVA (CEREBRAL VASCULAR ACCIDENT) (HCC): Status: ACTIVE | Noted: 2023-04-13

## 2023-04-20 PROBLEM — D61.810 ANTINEOPLASTIC CHEMOTHERAPY INDUCED PANCYTOPENIA (CODE) (HCC): Status: ACTIVE | Noted: 2022-07-14

## 2023-04-24 ENCOUNTER — APPOINTMENT (OUTPATIENT)
Dept: LAB | Facility: HOSPITAL | Age: 68
End: 2023-04-24

## 2023-04-24 DIAGNOSIS — C54.1 ENDOMETRIAL CANCER (HCC): ICD-10-CM

## 2023-04-24 LAB — MAGNESIUM SERPL-MCNC: 1.7 MG/DL (ref 1.9–2.7)

## 2023-04-26 RX ORDER — DEXTROSE MONOHYDRATE 50 MG/ML
20 INJECTION, SOLUTION INTRAVENOUS ONCE
Status: CANCELLED | OUTPATIENT
Start: 2023-04-27

## 2023-04-27 ENCOUNTER — HOSPITAL ENCOUNTER (OUTPATIENT)
Dept: INFUSION CENTER | Facility: HOSPITAL | Age: 68
Discharge: HOME | End: 2023-04-27
Attending: OBSTETRICS & GYNECOLOGY

## 2023-04-27 ENCOUNTER — PATIENT OUTREACH (OUTPATIENT)
Dept: HEMATOLOGY ONCOLOGY | Facility: CLINIC | Age: 68
End: 2023-04-27

## 2023-04-27 ENCOUNTER — TELEPHONE (OUTPATIENT)
Dept: NUTRITION | Facility: CLINIC | Age: 68
End: 2023-04-27

## 2023-04-27 VITALS
RESPIRATION RATE: 18 BRPM | TEMPERATURE: 97.5 F | WEIGHT: 189.82 LBS | HEART RATE: 101 BPM | OXYGEN SATURATION: 97 % | DIASTOLIC BLOOD PRESSURE: 58 MMHG | HEIGHT: 63 IN | BODY MASS INDEX: 33.63 KG/M2 | SYSTOLIC BLOOD PRESSURE: 124 MMHG

## 2023-04-27 DIAGNOSIS — C54.1 ENDOMETRIAL CANCER (HCC): Primary | ICD-10-CM

## 2023-04-27 RX ORDER — DEXTROSE MONOHYDRATE 50 MG/ML
20 INJECTION, SOLUTION INTRAVENOUS ONCE
Status: COMPLETED | OUTPATIENT
Start: 2023-04-27 | End: 2023-04-27

## 2023-04-27 RX ADMIN — DEXAMETHASONE SODIUM PHOSPHATE 10 MG: 10 INJECTION, SOLUTION INTRAMUSCULAR; INTRAVENOUS at 09:43

## 2023-04-27 RX ADMIN — DEXTROSE MONOHYDRATE 20 ML/HR: 50 INJECTION, SOLUTION INTRAVENOUS at 10:13

## 2023-04-27 RX ADMIN — DOXORUBICIN HYDROCHLORIDE 57 MG: 2 INJECTABLE, LIPOSOMAL INTRAVENOUS at 10:14

## 2023-04-27 NOTE — PROGRESS NOTES
MSW met with the pt and her spouse in the infusion center this day  The pt was pleasant and denied pain at this time  She shared that her recent hospital stay was due to having a stroke in her eye  The pt briefly lost partial vision and this has since returned  An MRI determined that this in fact, was a stroke and she reports that she is now on a blood thinner  She states that this has also made her more susceptible to rahman  Pt presents as appropriate and she does not appear anxious as she shared this information  The pt is on a new chemotherapy and she does report that this has caused her to be much more lethargic  She states that she does not feel any more rested after sleeping for a length of time  Pt had discussed this with Gyn Onc and they have slightly changed her treatment and she is hopeful to not feel quite as tired after this treatment  Pt reports no other OSW needs at this time  Emotional support offered

## 2023-04-27 NOTE — TELEPHONE ENCOUNTER
Received notification by infusion RN, Lester WEATHERS , on 4/27/23 that pt has triggered for oncology nutrition care (reason for referral: Malnutrition Screening Tool (MST) Triggers: scored a 4 indicating 24-33# (11-15 kg) recent wt loss and is eating poorly due to a decreased appetite  (Date of MST: 4/27/23))  Contacted Hattie Sanchez and introduced self and explained the reason for today's call  Spoke with Hattie Sanchez today who reports she would like to meet with RD to discuss her nutrition  Discussed oncology nutrition services available (options for in-person and phone consultation) and the benefits of meeting for a consultation  Initial RD consultation set up for 5/8/23 at 9 am       Provided this RD’s contact information asking that Hattie Sanchez reach out prn  All questions/concerns addressed at this time

## 2023-04-28 ENCOUNTER — TELEPHONE (OUTPATIENT)
Dept: NEUROLOGY | Facility: CLINIC | Age: 68
End: 2023-04-28

## 2023-04-28 NOTE — TELEPHONE ENCOUNTER
Hello,     Can you please advise if AP and or Attending Only? Follow up with stroke clinic as outpatient       [facility]/YAHAIRA WARE/ GARRICK/CVA    Discharged home on 04/16/2023      Thank you,     Tenisha Rogers

## 2023-05-01 ENCOUNTER — APPOINTMENT (OUTPATIENT)
Dept: LAB | Facility: HOSPITAL | Age: 68
End: 2023-05-01

## 2023-05-01 DIAGNOSIS — C54.1 ENDOMETRIAL CANCER (HCC): ICD-10-CM

## 2023-05-01 LAB — MAGNESIUM SERPL-MCNC: 2.1 MG/DL (ref 1.6–2.6)

## 2023-05-01 NOTE — TELEPHONE ENCOUNTER
1st attempt,     No answer, unable to [facility] phone rings and rings and then justhangs up      Thank you,     Tena Dave

## 2023-05-08 ENCOUNTER — NUTRITION (OUTPATIENT)
Dept: NUTRITION | Facility: HOSPITAL | Age: 68
End: 2023-05-08

## 2023-05-08 DIAGNOSIS — Z71.3 NUTRITIONAL COUNSELING: Primary | ICD-10-CM

## 2023-05-08 NOTE — PATIENT INSTRUCTIONS
Nutrition Rx & Recommendations:  Diet: High Calorie, High Protein (for high calorie foods see pages 52-53, and for high protein foods see pages 49-51 in your Eating Hints book)  Keep a daily food journal (pen/paper, melissa such as [employer])  Nutrition Supplements: try to consume at least one daily; aim for higher calorie/protein options like Ensure Plus or Complete  May use homemade shakes/smoothies as desired  If using a pre-made shake/bar, choose ones with >300 calories and >10 grams protein (ex  Ensure Complete, Ensure Enlive, Ensure Plus, Boost Plus, Boost Very High Calorie, etc )  Small, frequent meals/snacks may be easier to tolerate than 3 large daily meals  Aim for 5-6 small meals per day (every 2-3 hours)  Include protein at all meals/snacks  Include a variety of foods (as tolerated/allowed by diet)  Incorporate physical activity as able/allowed  Stay hydrated by sipping fluids of choice/tolerance throughout the day  Liquid nutrition may be better tolerated than solids at times  Alter food choices and eating patterns to accommodate changing needs  Refer to Eating Hints book for other meal/snack ideas and symptom management  Follow proper oral care; Try baking soda/salt water rinse recipe (mix 3/4 tsp salt + 1 tsp baking soda + 1 qt water; rinse with plain water after using) in Eating Hints book (pg 18)  Brush your teeth before/after meals & before bed  For lack of taste: Practice good oral care  Blend fresh fruits into shakes, ice cream or yogurt  Eat frozen fruits: grapes, chopped cantaloupe, watermelon  Select fresh vegetables (may be more appealing than canned/frozen)  Add extra flavor to foods: experiment with spices, salt & sweeteners, extra oil/butter, acidic foods; marinate meats (see pages 29-30 in your Eating Hints book)    For constipation: drink plenty of fluids (>64 oz/day); drink hot liquids; eat high-fiber foods as tolerated (whole grains, beans, peas, nuts, seeds, fruits, vegetables, etc); increase physical activity as tolerated  Avoid increasing fiber intake too quickly, add fiber into your diet slowly; keep a record of your bowel movements (see pages 13-14 in you Eating Hints book)  For nausea/vomiting: try plain/bland foods; try lemon/lime flavors; eat 5-6 small meals/day (except when vomiting); do not skip meals/snacks; choose appealing foods; sip only small amounts of liquids during meals; stay hydrated in between meals; eat foods/drinks that are room temperature; eat dry toast/crackers (see pages  21-22 and 31-32 in your Eating Hints book)  For dry mouth: sip water throughout the day; try very sweet (or tart, as tolerated) drinks; chew gum or suck on hard candy, popsicles, & ice chips; eat easy to swallow foods (such as pureed cooked foods or soups); moisten food with sauce/gravy/salad dressing; do not drink beer, wine, or any type of alcohol; keep lips moist with lip balm; avoid tobacco products & second-hand smoke; try Biotene as needed (see pages 17-18 in your Eating Hints book)  Follow proper oral care; Try baking soda/salt water rinse recipe (mix 3/4 tsp salt + 1 tsp baking soda + 1 qt water; rinse with pain water after using) in Eating Hints book (pg 18)  Brush your teeth before/after meals & before bed  Weigh yourself regularly  If you notice weight loss, make an effort to increase your daily food/calorie intake  If you continue to notice loss after these efforts, reach out to your dietitian to establish a plan to stabilize weight     Try Fairlife milk  Small frequent meal/snack ideas:  Whole milk greek yogurt  Cottage cheese and fruit  String cheese and some crackers  Handful of nuts or trailmix  Ensure  fairlife milk OR fairlife milk w/ carnation instant breakfast    Follow Up Plan: PRN per patient request  Recommend Referral to Other Providers: none at this time

## 2023-05-08 NOTE — PROGRESS NOTES
Outpatient Oncology Nutrition Consultation   Type of Consult: Initial Consult  Care Location: Office Visit    Reason for referral: Received notification by infusion RN, Lázaro WEATHERS , on 4/27/23 that pt has triggered for oncology nutrition care (reason for referral: Malnutrition Screening Tool (MST) Triggers: scored a 4 indicating 24-33# (11-15 kg) recent wt loss and is eating poorly due to a decreased appetite  (Date of MST: 4/27/23))  Nutrition Assessment:   Oncology Diagnosis & Treatments: dx with stage IVb grade 3 endometrial cancer with liver, lung, retroperitoneal lymph node metastases 1/2022  -s/p chemo (taxol and carboplatin) 2/24/23  Received 4 cycles in the neoadjuvant setting, and 2 following aborting debulking  -s/p RT from 2/6/23-2/24/23  -5/31/23- Robotic-assisted BSO and lysis of adhesions  Hysterectomy aborted due to parametrial/cervical involvement and adhesive disease   -started on Keytruda & Kennis Portal 9/2022  -now on Doxil (started 3/31/23)  Oncology History   Endometrial cancer ([facility] )   1/2022 Initial Diagnosis    Endometrial cancer ([facility] )     1/4/2022 Biopsy    [facility]    A  Cervical polyp biopsy:   High grade endometrial carcinoma    B  Endometrium biopsy:  High grade endometrial carcinoma     2/17/2022 -  Cancer Staged    Staging form: Corpus Uteri - Carcinoma, AJCC 8th Edition  - Clinical: FIGO Stage IVB (cM1) - Signed by Mickey Smith MD on 2/17/2022  Histologic grade (G): G3  Histologic grading system: 3 grade system       2/24/2022 -  Chemotherapy    Taxol 175 mg/m2 and carboplatin AUC 6 every 21 days  Taxol dose-reduced to 135 mg/m2 with cycle 2 due to arthralgias/myalgias  She has received 4 cycles in the neoadjuvant setting, and 2 following aborting debulking  Carboplatin dose-reduced to AUC 5 with cycle 5 d/t thrombocytopenia  3/11/2022 Genomic Testing    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, ER/TX positive, microsatellite stable    No other targeted therapy opportunities  5/31/2022 Surgery    Robotic-assisted BSO and lysis of adhesions  Hysterectomy aborted due to parametrial/cervical involvement and adhesive disease  9/15/2022 -  Chemotherapy    Keytruda 200 mg IV every 21 days with oral lenvatinib 10 mg daily (previously increased from 4 mg)  She is scheduled for cycle 8          - 2/24/2023 Radiation    Per patient, she completed 15 fractions to pelvis on 2/24/23 2/6/2023 - 2/24/2023 Radiation    Course: C1    Plan ID Energy Fractions Dose per Fraction (cGy) Dose Correction (cGy) Total Dose Delivered (cGy) Elapsed Days   Pelvis 10X 15 / 15 250 0 3,750 18         3/30/2023 -  Chemotherapy    Doxil 40 mg/m2 IV every 28 days  She received one cycle which was complicated by pancytopenia  Cycle 2, dose-reduce to 30 mg/m2 IV every 28 days           Past Medical & Surgical Hx:   Patient Active Problem List   Diagnosis   • Endometrial cancer ([facility] )   • Hyperlipidemia associated with type 2 diabetes mellitus ([email or web address]   • Type 2 diabetes mellitus without complication ([email or web address]   • Morbid obesity with BMI of 40 0-44 9, adult ([facility] )   • Postoperative state   • Antineoplastic chemotherapy induced pancytopenia (CODE) (HCC)   • Chemotherapy-induced thrombocytopenia   • Hypertensive urgency   • Headache   • HTN (hypertension)   • CVA (cerebral vascular accident) ([email or web address]     Past Medical History:   Diagnosis Date   • Anxiety    • Arthritis    • Back pain    • Cancer ([facility] )    • Depression    • Diabetes mellitus ([email or web address] )    • Disease of thyroid gland    • Endometrial cancer (HCC)    • GERD (gastroesophageal reflux disease)    • Hyperlipidemia associated with type 2 diabetes mellitus ([facility] )    • Hypertension    • Morbid obesity with BMI of 40 0-44 9, adult ([facility] )    • Type 2 diabetes mellitus without complication ([facility] )    • Urinary incontinence    • Wears glasses      Past Surgical History:   Procedure Laterality Date   • ABDOMINAL ADHESION SURGERY N/A 5/31/2022    Procedure: LYSIS ADHESIONS LAPAROSCOPIC W ROBOT;  Surgeon: Merlene Hsieh MD;  Location: [facility];  Service: Gynecology Oncology   • CATARACT EXTRACTION W/ INTRAOCULAR LENS IMPLANT Bilateral    • CHOLECYSTECTOMY OPEN     • CYSTOSCOPY N/A 5/31/2022    Procedure: Ugo Hurt;  Surgeon: Merlene Hsieh MD;  Location: [facility];  Service: Gynecology Oncology   • IR PORT PLACEMENT  03/07/2022   • NH LAPS TOTAL HYSTERECT 250 GM/< W/RMVL TUBE/OVARY N/A 5/31/2022    Procedure: ROBOTIC ASSISTED TOTAL LAPAROSCOPIC HYSTERECTOMY, RIGHT SALPINGO-OOPHORECTOMY, LEFT SALPINGECTOMY,;  Surgeon: Merlene Hsieh MD;  Location: [facility];  Service: Gynecology Oncology   • SALPINGOOPHORECTOMY N/A     only had one removed and not sure which   • TONSILLECTOMY         Review of Medications:   Vitamins, Supplements and Herbals: No, pt denies taking supplements    Current Outpatient Medications:   •  acetaminophen (TYLENOL) 650 mg CR tablet, Take 1,300 mg by mouth in the morning and 1,300 mg before bedtime  , Disp: , Rfl:   •  al mag oxide-diphenhydramine-lidocaine viscous (MAGIC MOUTHWASH) 1:1:1 suspension, Swish and spit 10 mL every 4 (four) hours as needed for mouth pain or discomfort, Disp: 90 mL, Rfl: 0  •  amLODIPine (NORVASC) 10 mg tablet, , Disp: , Rfl:   •  aspirin (ECOTRIN LOW STRENGTH) 81 mg EC tablet, Take 1 tablet (81 mg total) by mouth daily Do not start before April 17, 2023 , Disp: 30 tablet, Rfl: 0  •  atorvastatin (LIPITOR) 40 mg tablet, Take 1 tablet (40 mg total) by mouth daily with dinner, Disp: 30 tablet, Rfl: 0  •  BD Pen Needle Yoana 2nd Gen 32G X 4 MM MISC, 2 (two) times a day, Disp: , Rfl:   •  clopidogrel (PLAVIX) 75 mg tablet, Take 1 tablet (75 mg total) by mouth daily for 21 days Do not start before April 17, 2023 , Disp: 21 tablet, Rfl: 0  •  Contour Next Test test strip, 2 (two) times a day Test, Disp: , Rfl:   •  fexofenadine (ALLEGRA) 180 MG tablet, every 24 hours, Disp: , Rfl:   •  furosemide (LASIX) 40 mg tablet, Take 1 tablet by mouth daily, Disp: , Rfl:   •  Lenvima, 10 MG Daily Dose, 10 MG CPPK, TAKE 1 CAPSULE BY MOUTH DAILY, Disp: 30 each, Rfl: 2  •  lidocaine-prilocaine (EMLA) cream, Apply to port site 30 min prior to labs/chemo, Disp: 30 g, Rfl: 2  •  lisinopril (ZESTRIL) 40 mg tablet, Take 1 tablet (40 mg total) by mouth daily, Disp: 30 tablet, Rfl: 0  •  LORazepam (ATIVAN) 0 5 mg tablet, Take 0 5 mg by mouth in the morning and 0 5 mg in the evening , Disp: , Rfl:   •  metFORMIN (GLUCOPHAGE) 1000 MG tablet, Take 1 tablet by mouth 2 (two) times a day with meals, Disp: , Rfl:   •  Microlet Lancets MISC, 2 (two) times a day Test, Disp: , Rfl:   •  omeprazole (PriLOSEC) 20 mg delayed release capsule, every 24 hours, Disp: , Rfl:   •  ondansetron (ZOFRAN) 8 mg tablet, Take 1 tablet (8 mg total) by mouth every 8 (eight) hours as needed for nausea or vomiting, Disp: 20 tablet, Rfl: 1  •  sitaGLIPtin (JANUVIA) 100 mg tablet, Take 1 tablet by mouth daily, Disp: , Rfl:   •  Toujeo SoloStar 300 units/mL CONCENTRATED U-300 injection pen (1-unit dial), every 12 (twelve) hours 34 units in the am and 36 units at night, Disp: , Rfl:     Most Recent Lab Results:   Lab Results   Component Value Date    WBC 4 45 05/01/2023    NEUTROABS 1 36 (L) 04/16/2023    CHOLESTEROL 169 04/14/2023    TRIG 273 (H) 04/14/2023    HDL 26 (L) 04/14/2023    LDLCALC 88 04/14/2023    ALT 15 05/01/2023    AST 30 05/01/2023    ALB 2 9 (L) 05/01/2023    SODIUM 132 (L) 05/01/2023    SODIUM 137 04/24/2023    K 4 5 05/01/2023    K 4 5 04/24/2023     05/01/2023    BUN 32 (H) 05/01/2023    BUN 28 (H) 04/24/2023    CREATININE 1 10 05/01/2023    CREATININE 0 99 04/24/2023    EGFR 51 05/01/2023    POCGLU 154 (H) 04/16/2023    GLUF 255 (H) 03/13/2023    GLUF 167 (H) 03/06/2023    GLUC 265 (H) 05/01/2023    HGBA1C 6 4 (H) 04/14/2023    HGBA1C 8 3 (H) 05/11/2022    CALCIUM 10 1 05/01/2023    MG 2 1 05/01/2023       Anthropometric Measurements: "  Height: 63\"  Ht Readings from Last 1 Encounters:   04/27/23 5' 2 99\" (1 6 m)     Wt Readings from Last 20 Encounters:   04/27/23 86 1 kg (189 lb 13 1 oz)   04/20/23 88 5 kg (195 lb)   04/14/23 88 5 kg (195 lb)   03/30/23 88 9 kg (195 lb 15 8 oz)   03/23/23 88 5 kg (195 lb)   03/02/23 91 5 kg (201 lb 11 5 oz)   01/20/23 91 6 kg (202 lb)   01/19/23 92 kg (202 lb 13 2 oz)   01/12/23 92 1 kg (203 lb)   12/29/22 93 2 kg (205 lb 7 5 oz)   12/21/22 93 4 kg (206 lb)   12/08/22 93 7 kg (206 lb 9 1 oz)   12/01/22 94 kg (207 lb 4 8 oz)   11/17/22 95 2 kg (209 lb 14 1 oz)   11/10/22 97 1 kg (214 lb)   10/27/22 97 1 kg (214 lb 1 1 oz)   10/06/22 96 7 kg (213 lb 3 2 oz)   09/29/22 99 8 kg (220 lb)   09/15/22 102 kg (224 lb 3 3 oz)   09/12/22 104 kg (230 lb)       Weight History:   • Usual Weight: 235# prior to dx  • Varian: (2/13/22) 188 2#, (2/20/22) 202 6#  • Home Scale: n/a    Oncology Nutrition-Anthropometrics    Flowsheet Row Nutrition from 5/8/2023 in [address] [place] [address] Dietitian [facility]   Patient age (years): 75 years   Patient (female) height (in): 61 in   Current Weight to be used for anthropometric calculations (lbs) 189 8 lbs   Current Weight to be used for anthropometric calculations (kg) 86 3 kg   BMI: 33 6   IBW female: 115 lbs   IBW (kg) female: 52 3 kg   IBW % (female) 165 %   Adjusted BW (female): 133 7 lbs   Adjusted BW kg (female): 60 8 kg   % weight change after 1 month: -3 2 %   Weight change after 1 month (lbs) -6 2 lbs   % weight change after 3 months: -6 %   Weight change after 3 months (lbs) -12 2 lbs   % weight change after 6 months: -11 3 %   Weight change after 6 months (lbs) -24 3 lbs          Nutrition-Focused Physical Findings: none observed    Food/Nutrition-Related History & Client/Social History:    Current Nutrition Impact Symptoms:  [x] Nausea -has anti-emetic [x] Reduced Appetite -fluctuates [] Acid Reflux    [] Vomiting  [x] Unintended Wt Loss  [] Malabsorption    [] Diarrhea  [] " Unintended Wt Gain  [] Dumping Syndrome    [x] Constipation  [] Thick Mucous/Secretions  [] Abdominal Pain    [x] Dysgeusia (Altered Taste)-doesn't taste normal  [x] Xerostomia (Dry Mouth)  [] Gas    [x] Dysosmia (Altered Smell) -certain foods like eggs, most meats [] Thrush  [] Difficulty Chewing    [] Oral Mucositis (Sore Mouth)  [x] Fatigue  [x] Hyperglycemia  - takes metformin and [place] [facility] [place]  Lab Results   Component Value Date    HGBA1C 6 4 (H) 2023      [] Odynophagia  [] Esophagitis  [] Other:    [] Dysphagia  [] Early Satiety  [] No Problems Eating      Food Allergies & Intolerances: no    Current Diet: Regular Diet, No Restrictions  Current Nutrition Intake: Less than usual   Appetite: Good, Fair , Fluctuating  Nutrition Route: PO  Oral Care: brushes once daily  Activity level: limited d/t fatigue, gets SOB easily    24 Hr Diet Recall:   Breakfast: Ensure original this morning   Plans to go out to breakfast after appt (plans to get oatmeal, piece of toast, and dry beef)  Snack: apple OR greek yogurt  Lunch: scoop of shrimp salad, 2 slices of tomato, applesauce  Snack: goldfish, OR cottage   Dinner: soup (turkey and bean)  Snack: nothing    Beverages: water (10oz x6), iced tea (unsweetened) (8oz x1-2), whole milk (8oz x1), gingerale (8oz sometimes)  Supplements: couple times a week  • Ensure Plus (8 oz, 350 kcal, 16 g pro)    • Ensure Original (8 oz, 220 kcal, 9 g pro)        Oncology Nutrition-Estimated Needs    Flowsheet Row Nutrition from 2023 in [facility] Dietitian [facility]   Weight type used Actual weight   Weight in kilograms (kg) used for estimated needs 86 3 kg   Energy needs formula:  30-35 kcal/kg   Energy needs based on 30 kcal/k kcal   Energy needs based on 35 kcal/k kcal   Protein needs formula: 1 2-1 5 g/kg   Protein needs based on 1 2 g/k g   Protein needs based on 1 5 g/kg 129 g   Fluid needs formula: 30-35 mL/kg   Fluid needs based on 30 mL/kg 2592 mL Fluid needs in ounces 88 oz   Fluid needs based on 35 mL/kg 3024 mL   Fluid needs in ounces 102 oz           Discussion & Intervention:   Brooklynn Escalante was evaluated today for an initial RD consultation regarding unintended weight loss, poor po intake and nutrition impact sx management  Brooklynn Escalante is currently undergoing tx for endometrial cancer  Met with Brooklynn Escalante and her  today  She is currently receiving chemo  She reports occasional nausea, which she has anti-emetics for but reports this can sometimes cause constipation  Reviewed MNT for both  She also reports a decrease in appetite and some days she doesn't feel like eating  Discussed small/frequent meals and choosing calorie and protein dense foods  She has Ensure shakes- encouraged higher calorie/protein options  Reviewed snack ideas as well  She has some dysgeusia and food aversions especially with meat  Reviewed other sources of protein  Reviewed 24 hour recall, which revealed an inadequate po intake, and discussed ways to increase kcal, protein, and fluid intakes and optimize nutrient intake  Also reviewed the importance of wt management throughout the tx process and the role of a high kcal/ high protein diet and carbohydrate controlled diet in managing wt and overall health    Based on today's assessment, discussion included: MNT for: nausea, constipation, dysgeusia, dysosmia, fatigue, decreased appetite, wt loss, dry mouth, how to modify foods for anticipated nutrition impact symptoms pt may experience during CA tx, practicing proper oral care, a high kcal/protein diet & food choices to include at all meals & snacks (Examples of high kcal foods: cheese, full-fat dairy products, nut butter, plant-based fats, coconut oil/milk, avocado, butter, cream soup, etc  Examples of high protein foods: eggs, chicken, fish, beans/legumes, nuts/nut butters, bone broth, etc ) , a diabetic diet & food choices to include at all meals & snacks, fortifying foods for added kcal and protein (examples include: adding cheese to foods such as eggs, mashed potatoes, casseroles, etc ; Making oatmeal with whole milk rather than water; Making fortified mashed potatoes with cream, butter, dry milk powder, plain [place] yogurt, and cheese ), adequate hydration & fluid choices, sipping on calorie containing beverages (examples include: adding whole milk or cream to coffee, oral nutrition supplements, juice, electrolyte replacement beverages, milk, etc ), eating smaller more frequent meals every 2-3 hours (5-6 small meals/day), choosing higher kcal/protein oral nutrition supplements and increasing oral nutrition supplements, recipe suggestions/resources, trying new recipes, & cooking at home more often, modifying foods to increase their palatability & experimenting with new foods/flavors/cuisines and adding extra fat to foods while cooking such as butter, plant-based oil, coconut oil/milk, avocado, nut butters, etc    Moving forward, Nessa Ramirez was encouraged to increase kcal, protein, and fluid intakes   Materials Provided: incrediblue book, Ensure Coupons, Commercial Nutrition Supplements List, protein sources of food, high calorie/protein snack ideas and Oncology Nutrition Services Brochure  All questions and concerns addressed during today’s visit  Nessa Ramirez has RD contact information  Nutrition Diagnosis:   • Inadequate Energy Intake related to physiological causes, disease state and treatment related issues as evidenced by food recall, wt loss and discussion with pt and/or family  • Increased Nutrient Needs (kcal & pro) related to increased demand for nutrients and disease state as evidenced by cancer dx and pt undergoing tx for cancer    Monitoring & Evaluation:   Goals:  · weight maintenance/stabilization  · adequate nutrition impact symptom management  · pt to meet >/=75% estimated nutrition needs daily    · Progress Towards Goals: Initiated    Nutrition Rx & Recommendations:  · Diet: High Calorie, High Protein (for high calorie foods see pages 52-53, and for high protein foods see pages 49-51 in your Eating Hints book)  · Keep a daily food journal (pen/paper, melissa such as [employer])  · Nutrition Supplements: try to consume at least one daily; aim for higher calorie/protein options like Ensure Plus or Complete  May use homemade shakes/smoothies as desired  If using a pre-made shake/bar, choose ones with >300 calories and >10 grams protein (ex  Ensure Complete, Ensure Enlive, Ensure Plus, Boost Plus, Boost Very High Calorie, etc )  · Small, frequent meals/snacks may be easier to tolerate than 3 large daily meals  Aim for 5-6 small meals per day (every 2-3 hours)  · Include protein at all meals/snacks  · Include a variety of foods (as tolerated/allowed by diet)  · Incorporate physical activity as able/allowed  · Stay hydrated by sipping fluids of choice/tolerance throughout the day  · Liquid nutrition may be better tolerated than solids at times  · Alter food choices and eating patterns to accommodate changing needs  · Refer to Eating Hints book for other meal/snack ideas and symptom management  · Follow proper oral care; Try baking soda/salt water rinse recipe (mix 3/4 tsp salt + 1 tsp baking soda + 1 qt water; rinse with plain water after using) in Eating Hints book (pg 18)  Brush your teeth before/after meals & before bed  · For lack of taste: Practice good oral care  Blend fresh fruits into shakes, ice cream or yogurt  Eat frozen fruits: grapes, chopped cantaloupe, watermelon  Select fresh vegetables (may be more appealing than canned/frozen)  Add extra flavor to foods: experiment with spices, salt & sweeteners, extra oil/butter, acidic foods; marinate meats (see pages 29-30 in your Eating Hints book)    · For constipation: drink plenty of fluids (>64 oz/day); drink hot liquids; eat high-fiber foods as tolerated (whole grains, beans, peas, nuts, seeds, fruits, vegetables, etc); increase physical activity as tolerated  Avoid increasing fiber intake too quickly, add fiber into your diet slowly; keep a record of your bowel movements (see pages 13-14 in you Eating Hints book)  · For nausea/vomiting: try plain/bland foods; try lemon/lime flavors; eat 5-6 small meals/day (except when vomiting); do not skip meals/snacks; choose appealing foods; sip only small amounts of liquids during meals; stay hydrated in between meals; eat foods/drinks that are room temperature; eat dry toast/crackers (see pages  21-22 and 31-32 in your Eating Hints book)  · For dry mouth: sip water throughout the day; try very sweet (or tart, as tolerated) drinks; chew gum or suck on hard candy, popsicles, & ice chips; eat easy to swallow foods (such as pureed cooked foods or soups); moisten food with sauce/gravy/salad dressing; do not drink beer, wine, or any type of alcohol; keep lips moist with lip balm; avoid tobacco products & second-hand smoke; try Biotene as needed (see pages 17-18 in your Eating Hints book)  Follow proper oral care; Try baking soda/salt water rinse recipe (mix 3/4 tsp salt + 1 tsp baking soda + 1 qt water; rinse with pain water after using) in Eating Hints book (pg 18)  Brush your teeth before/after meals & before bed  · Weigh yourself regularly  If you notice weight loss, make an effort to increase your daily food/calorie intake  If you continue to notice loss after these efforts, reach out to your dietitian to establish a plan to stabilize weight     · Try Fairlife milk  · Small frequent meal/snack ideas:  · Whole milk greek yogurt  · Cottage cheese and fruit  · String cheese and some crackers  · Handful of nuts or trailmix  · Ensure  · fairlife milk OR fairlife milk w/ carnation instant breakfast    Follow Up Plan: PRN per patient request  Recommend Referral to Other Providers: none at this time

## 2023-05-09 ENCOUNTER — APPOINTMENT (OUTPATIENT)
Dept: LAB | Facility: HOSPITAL | Age: 68
End: 2023-05-09

## 2023-05-09 DIAGNOSIS — C54.1 ENDOMETRIAL CANCER (HCC): ICD-10-CM

## 2023-05-09 LAB — MAGNESIUM SERPL-MCNC: 2 MG/DL (ref 1.9–2.7)

## 2023-05-10 ENCOUNTER — APPOINTMENT (OUTPATIENT)
Dept: LAB | Facility: HOSPITAL | Age: 68
End: 2023-05-10

## 2023-05-10 DIAGNOSIS — C54.1 ENDOMETRIAL CANCER (HCC): Primary | ICD-10-CM

## 2023-05-10 DIAGNOSIS — T45.1X5A ANTINEOPLASTIC CHEMOTHERAPY INDUCED ANEMIA: ICD-10-CM

## 2023-05-10 DIAGNOSIS — D64.81 ANTINEOPLASTIC CHEMOTHERAPY INDUCED ANEMIA: ICD-10-CM

## 2023-05-10 DIAGNOSIS — D61.810 ANTINEOPLASTIC CHEMOTHERAPY INDUCED PANCYTOPENIA (CODE) (HCC): ICD-10-CM

## 2023-05-10 RX ORDER — ACETAMINOPHEN 325 MG/1
650 TABLET ORAL ONCE
Status: CANCELLED | OUTPATIENT
Start: 2023-05-10

## 2023-05-10 RX ORDER — DIPHENHYDRAMINE HCL 25 MG
25 TABLET ORAL ONCE
Status: CANCELLED | OUTPATIENT
Start: 2023-05-10

## 2023-05-10 RX ORDER — DIPHENHYDRAMINE HCL 25 MG
25 TABLET ORAL ONCE
Status: CANCELLED | OUTPATIENT
Start: 2023-05-11

## 2023-05-10 RX ORDER — SODIUM CHLORIDE 9 MG/ML
20 INJECTION, SOLUTION INTRAVENOUS ONCE
Status: CANCELLED | OUTPATIENT
Start: 2023-05-10

## 2023-05-10 RX ORDER — ACETAMINOPHEN 325 MG/1
650 TABLET ORAL ONCE
Status: CANCELLED | OUTPATIENT
Start: 2023-05-11

## 2023-05-10 RX ORDER — SODIUM CHLORIDE 9 MG/ML
20 INJECTION, SOLUTION INTRAVENOUS ONCE
Status: CANCELLED | OUTPATIENT
Start: 2023-05-11

## 2023-05-11 ENCOUNTER — HOSPITAL ENCOUNTER (OUTPATIENT)
Dept: INFUSION CENTER | Facility: HOSPITAL | Age: 68
Discharge: HOME | End: 2023-05-11

## 2023-05-11 VITALS
DIASTOLIC BLOOD PRESSURE: 56 MMHG | TEMPERATURE: 97.5 F | RESPIRATION RATE: 14 BRPM | SYSTOLIC BLOOD PRESSURE: 120 MMHG | HEART RATE: 88 BPM | OXYGEN SATURATION: 93 %

## 2023-05-11 DIAGNOSIS — D61.810 ANTINEOPLASTIC CHEMOTHERAPY INDUCED PANCYTOPENIA (CODE) (HCC): Primary | ICD-10-CM

## 2023-05-11 RX ORDER — ACETAMINOPHEN 325 MG/1
650 TABLET ORAL ONCE
Status: COMPLETED | OUTPATIENT
Start: 2023-05-11 | End: 2023-05-11

## 2023-05-11 RX ORDER — DIPHENHYDRAMINE HCL 25 MG
25 TABLET ORAL ONCE
Status: COMPLETED | OUTPATIENT
Start: 2023-05-11 | End: 2023-05-11

## 2023-05-11 RX ORDER — SODIUM CHLORIDE 9 MG/ML
20 INJECTION, SOLUTION INTRAVENOUS ONCE
Status: COMPLETED | OUTPATIENT
Start: 2023-05-11 | End: 2023-05-11

## 2023-05-11 RX ADMIN — ACETAMINOPHEN 325MG 650 MG: 325 TABLET ORAL at 10:34

## 2023-05-11 RX ADMIN — SODIUM CHLORIDE 20 ML/HR: 9 INJECTION, SOLUTION INTRAVENOUS at 10:30

## 2023-05-11 RX ADMIN — DIPHENHYDRAMINE HYDROCHLORIDE 25 MG: 25 TABLET ORAL at 10:34

## 2023-05-11 NOTE — PROGRESS NOTES
Patient blood transfusion completed without complication  Patient given AVS at this time and confirmed next appointment  Patient discharged in stable condition to home via ambulation

## 2023-05-11 NOTE — PLAN OF CARE
Problem: Knowledge Deficit  Goal: Patient/family/caregiver demonstrates understanding of disease process, treatment plan, medications, and discharge instructions  Description: Complete learning assessment and assess knowledge base    Interventions:  - Provide teaching at level of understanding  - Provide teaching via preferred learning methods  Outcome: Progressing
-acute, sec to asp pna from poss occluded stent  -cont iv anitbx  -consider ID consultation  -monitor clinical course, monitor on tele with continuous pulse ox  -apprec GI collaboration-esophogram, npo, iv antibx, poss egd if occluded stent for removal and replacement

## 2023-05-12 LAB
ABO GROUP BLD BPU: NORMAL
ABO GROUP BLD BPU: NORMAL
BPU ID: NORMAL
BPU ID: NORMAL
CROSSMATCH: NORMAL
CROSSMATCH: NORMAL
UNIT DISPENSE STATUS: NORMAL
UNIT DISPENSE STATUS: NORMAL
UNIT PRODUCT CODE: NORMAL
UNIT PRODUCT CODE: NORMAL
UNIT PRODUCT VOLUME: 350 ML
UNIT PRODUCT VOLUME: 350 ML
UNIT RH: NORMAL
UNIT RH: NORMAL

## 2023-05-15 ENCOUNTER — APPOINTMENT (OUTPATIENT)
Dept: LAB | Facility: HOSPITAL | Age: 68
End: 2023-05-15

## 2023-05-15 DIAGNOSIS — C54.1 ENDOMETRIAL CANCER (HCC): ICD-10-CM

## 2023-05-15 LAB — MAGNESIUM SERPL-MCNC: 2.5 MG/DL (ref 1.6–2.6)

## 2023-05-16 ENCOUNTER — APPOINTMENT (OUTPATIENT)
Dept: LAB | Facility: HOSPITAL | Age: 68
End: 2023-05-16

## 2023-05-16 DIAGNOSIS — C54.1 ENDOMETRIAL CANCER (HCC): Primary | ICD-10-CM

## 2023-05-16 LAB — PLATELET # BLD AUTO: 64 THOUSANDS/UL (ref 149–390)

## 2023-05-22 ENCOUNTER — APPOINTMENT (OUTPATIENT)
Dept: LAB | Facility: HOSPITAL | Age: 68
End: 2023-05-22

## 2023-05-22 LAB
ALBUMIN SERPL BCP-MCNC: 2.4 G/DL (ref 3.5–5)
ALP SERPL-CCNC: 124 U/L (ref 46–116)
ALT SERPL W P-5'-P-CCNC: 15 U/L (ref 12–78)
ANION GAP SERPL CALCULATED.3IONS-SCNC: 8 MMOL/L (ref 4–13)
ANISOCYTOSIS BLD QL SMEAR: PRESENT
AST SERPL W P-5'-P-CCNC: 38 U/L (ref 5–45)
BASOPHILS # BLD MANUAL: 0 THOUSAND/UL (ref 0–0.1)
BASOPHILS NFR MAR MANUAL: 0 % (ref 0–1)
BILIRUB SERPL-MCNC: 0.71 MG/DL (ref 0.2–1)
BUN SERPL-MCNC: 29 MG/DL (ref 5–25)
BURR CELLS BLD QL SMEAR: PRESENT
CALCIUM ALBUM COR SERPL-MCNC: 11 MG/DL (ref 8.3–10.1)
CALCIUM SERPL-MCNC: 9.7 MG/DL (ref 8.3–10.1)
CHLORIDE SERPL-SCNC: 106 MMOL/L (ref 96–108)
CO2 SERPL-SCNC: 19 MMOL/L (ref 21–32)
CREAT SERPL-MCNC: 1.34 MG/DL (ref 0.6–1.3)
DACRYOCYTES BLD QL SMEAR: PRESENT
EOSINOPHIL # BLD MANUAL: 0.09 THOUSAND/UL (ref 0–0.4)
EOSINOPHIL NFR BLD MANUAL: 2 % (ref 0–6)
ERYTHROCYTE [DISTWIDTH] IN BLOOD BY AUTOMATED COUNT: 21.4 % (ref 11.6–15.1)
GFR SERPL CREATININE-BSD FRML MDRD: 40 ML/MIN/1.73SQ M
GLUCOSE SERPL-MCNC: 241 MG/DL (ref 65–140)
HCT VFR BLD AUTO: 27.7 % (ref 34.8–46.1)
HELMET CELLS BLD QL SMEAR: PRESENT
HGB BLD-MCNC: 8.4 G/DL (ref 11.5–15.4)
HYPERCHROMIA BLD QL SMEAR: PRESENT
LYMPHOCYTES # BLD AUTO: 0.36 THOUSAND/UL (ref 0.6–4.47)
LYMPHOCYTES # BLD AUTO: 8 % (ref 14–44)
MAGNESIUM SERPL-MCNC: 2.4 MG/DL (ref 1.6–2.6)
MCH RBC QN AUTO: 25.4 PG (ref 26.8–34.3)
MCHC RBC AUTO-ENTMCNC: 30.3 G/DL (ref 31.4–37.4)
MCV RBC AUTO: 84 FL (ref 82–98)
METAMYELOCYTES NFR BLD MANUAL: 4 % (ref 0–1)
MONOCYTES # BLD AUTO: 0.99 THOUSAND/UL (ref 0–1.22)
MONOCYTES NFR BLD: 22 % (ref 4–12)
MYELOCYTES NFR BLD MANUAL: 2 % (ref 0–1)
NEUTROPHILS # BLD MANUAL: 2.6 THOUSAND/UL (ref 1.85–7.62)
NEUTS BAND NFR BLD MANUAL: 6 % (ref 0–8)
NEUTS SEG NFR BLD AUTO: 52 % (ref 43–75)
NRBC BLD AUTO-RTO: 18 /100 WBC (ref 0–2)
OVALOCYTES BLD QL SMEAR: PRESENT
PLATELET BLD QL SMEAR: ABNORMAL
POIKILOCYTOSIS BLD QL SMEAR: PRESENT
POLYCHROMASIA BLD QL SMEAR: PRESENT
POTASSIUM SERPL-SCNC: 4.8 MMOL/L (ref 3.5–5.3)
PROT SERPL-MCNC: 6.5 G/DL (ref 6.4–8.4)
RBC # BLD AUTO: 3.31 MILLION/UL (ref 3.81–5.12)
RBC MORPH BLD: PRESENT
SCHISTOCYTES BLD QL SMEAR: PRESENT
SODIUM SERPL-SCNC: 133 MMOL/L (ref 135–147)
VARIANT LYMPHS # BLD AUTO: 4 %
WBC # BLD AUTO: 4.49 THOUSAND/UL (ref 4.31–10.16)

## 2023-05-25 ENCOUNTER — HOSPITAL ENCOUNTER (INPATIENT)
Facility: HOSPITAL | Age: 68
LOS: 2 days | Discharge: HOSPICE | End: 2023-05-27
Attending: EMERGENCY MEDICINE | Admitting: HOSPITALIST

## 2023-05-25 ENCOUNTER — APPOINTMENT (EMERGENCY)
Dept: RADIOLOGY | Facility: HOSPITAL | Age: 68
End: 2023-05-25

## 2023-05-25 ENCOUNTER — HOSPITAL ENCOUNTER (OUTPATIENT)
Dept: INFUSION CENTER | Facility: HOSPITAL | Age: 68
Discharge: HOME | End: 2023-05-25
Attending: OBSTETRICS & GYNECOLOGY

## 2023-05-25 ENCOUNTER — OFFICE VISIT (OUTPATIENT)
Age: 68
End: 2023-05-25

## 2023-05-25 ENCOUNTER — APPOINTMENT (EMERGENCY)
Dept: CT IMAGING | Facility: HOSPITAL | Age: 68
End: 2023-05-25

## 2023-05-25 VITALS
WEIGHT: 187 LBS | DIASTOLIC BLOOD PRESSURE: 70 MMHG | SYSTOLIC BLOOD PRESSURE: 128 MMHG | HEART RATE: 111 BPM | BODY MASS INDEX: 33.13 KG/M2 | OXYGEN SATURATION: 90 % | HEIGHT: 63 IN

## 2023-05-25 DIAGNOSIS — C54.1 ENDOMETRIAL CANCER (HCC): ICD-10-CM

## 2023-05-25 DIAGNOSIS — R09.02 HYPOXIA: ICD-10-CM

## 2023-05-25 DIAGNOSIS — E11.9 TYPE 2 DIABETES MELLITUS WITHOUT COMPLICATION, WITH LONG-TERM CURRENT USE OF INSULIN (HCC): ICD-10-CM

## 2023-05-25 DIAGNOSIS — C54.1 ENDOMETRIAL CANCER (HCC): Primary | ICD-10-CM

## 2023-05-25 DIAGNOSIS — E78.5 HYPERLIPIDEMIA ASSOCIATED WITH TYPE 2 DIABETES MELLITUS (HCC): ICD-10-CM

## 2023-05-25 DIAGNOSIS — R06.02 SHORTNESS OF BREATH: ICD-10-CM

## 2023-05-25 DIAGNOSIS — Z79.4 TYPE 2 DIABETES MELLITUS WITHOUT COMPLICATION, WITH LONG-TERM CURRENT USE OF INSULIN (HCC): ICD-10-CM

## 2023-05-25 DIAGNOSIS — R06.02 SOB (SHORTNESS OF BREATH): Primary | ICD-10-CM

## 2023-05-25 DIAGNOSIS — C79.9 METASTATIC CANCER (HCC): ICD-10-CM

## 2023-05-25 DIAGNOSIS — E11.69 HYPERLIPIDEMIA ASSOCIATED WITH TYPE 2 DIABETES MELLITUS (HCC): ICD-10-CM

## 2023-05-25 DIAGNOSIS — N39.0 UTI (URINARY TRACT INFECTION): ICD-10-CM

## 2023-05-25 PROBLEM — J96.91 RESPIRATORY FAILURE WITH HYPOXIA (HCC): Status: ACTIVE | Noted: 2023-05-25

## 2023-05-25 LAB
2HR DELTA HS TROPONIN: 0 NG/L
ALBUMIN SERPL BCP-MCNC: 3.3 G/DL (ref 3.5–5)
ALP SERPL-CCNC: 109 U/L (ref 34–104)
ALT SERPL W P-5'-P-CCNC: 8 U/L (ref 7–52)
ANION GAP SERPL CALCULATED.3IONS-SCNC: 12 MMOL/L (ref 4–13)
APTT PPP: 28 SECONDS (ref 23–37)
AST SERPL W P-5'-P-CCNC: 16 U/L (ref 13–39)
BACTERIA UR QL AUTO: ABNORMAL /HPF
BILIRUB SERPL-MCNC: 0.7 MG/DL (ref 0.2–1)
BILIRUB UR QL STRIP: NEGATIVE
BNP SERPL-MCNC: 36 PG/ML (ref 0–100)
BUN SERPL-MCNC: 35 MG/DL (ref 5–25)
CALCIUM ALBUM COR SERPL-MCNC: 10 MG/DL (ref 8.3–10.1)
CALCIUM SERPL-MCNC: 9.4 MG/DL (ref 8.4–10.2)
CARDIAC TROPONIN I PNL SERPL HS: 5 NG/L
CARDIAC TROPONIN I PNL SERPL HS: 5 NG/L
CHLORIDE SERPL-SCNC: 103 MMOL/L (ref 96–108)
CLARITY UR: ABNORMAL
CO2 SERPL-SCNC: 19 MMOL/L (ref 21–32)
COLOR UR: ABNORMAL
CREAT SERPL-MCNC: 1.15 MG/DL (ref 0.6–1.3)
D DIMER PPP FEU-MCNC: 2.19 UG/ML FEU
ERYTHROCYTE [DISTWIDTH] IN BLOOD BY AUTOMATED COUNT: 21.6 % (ref 11.6–15.1)
FLUAV RNA RESP QL NAA+PROBE: NEGATIVE
FLUBV RNA RESP QL NAA+PROBE: NEGATIVE
GFR SERPL CREATININE-BSD FRML MDRD: 49 ML/MIN/1.73SQ M
GLUCOSE SERPL-MCNC: 136 MG/DL (ref 65–140)
GLUCOSE SERPL-MCNC: 155 MG/DL (ref 65–140)
GLUCOSE SERPL-MCNC: 168 MG/DL (ref 65–140)
GLUCOSE UR STRIP-MCNC: NEGATIVE MG/DL
HCT VFR BLD AUTO: 29.3 % (ref 34.8–46.1)
HGB BLD-MCNC: 8.9 G/DL (ref 11.5–15.4)
HGB UR QL STRIP.AUTO: ABNORMAL
INR PPP: 1.14 (ref 0.84–1.19)
KETONES UR STRIP-MCNC: NEGATIVE MG/DL
LACTATE SERPL-SCNC: 1.5 MMOL/L (ref 0.5–2)
LEUKOCYTE ESTERASE UR QL STRIP: ABNORMAL
MCH RBC QN AUTO: 25.1 PG (ref 26.8–34.3)
MCHC RBC AUTO-ENTMCNC: 30.4 G/DL (ref 31.4–37.4)
MCV RBC AUTO: 83 FL (ref 82–98)
NITRITE UR QL STRIP: NEGATIVE
NON-SQ EPI CELLS URNS QL MICRO: ABNORMAL /HPF
OTHER STN SPEC: ABNORMAL
PH UR STRIP.AUTO: 6 [PH]
PLATELET # BLD AUTO: 109 THOUSANDS/UL (ref 149–390)
POTASSIUM SERPL-SCNC: 4.9 MMOL/L (ref 3.5–5.3)
PROCALCITONIN SERPL-MCNC: 1.07 NG/ML
PROT SERPL-MCNC: 6.1 G/DL (ref 6.4–8.4)
PROT UR STRIP-MCNC: ABNORMAL MG/DL
PROTHROMBIN TIME: 15.3 SECONDS (ref 11.6–14.5)
RBC # BLD AUTO: 3.54 MILLION/UL (ref 3.81–5.12)
RBC #/AREA URNS AUTO: ABNORMAL /HPF
RSV RNA RESP QL NAA+PROBE: NEGATIVE
SARS-COV-2 RNA RESP QL NAA+PROBE: NEGATIVE
SODIUM SERPL-SCNC: 134 MMOL/L (ref 135–147)
SP GR UR STRIP.AUTO: <1.005 (ref 1–1.03)
UROBILINOGEN UR STRIP-ACNC: <2 MG/DL
WBC # BLD AUTO: 5.4 THOUSAND/UL (ref 4.31–10.16)
WBC #/AREA URNS AUTO: ABNORMAL /HPF

## 2023-05-25 RX ORDER — LORAZEPAM 0.5 MG/1
0.5 TABLET ORAL 2 TIMES DAILY
Status: DISCONTINUED | OUTPATIENT
Start: 2023-05-25 | End: 2023-05-26

## 2023-05-25 RX ORDER — ACETAMINOPHEN 325 MG/1
650 TABLET ORAL EVERY 6 HOURS PRN
Status: DISCONTINUED | OUTPATIENT
Start: 2023-05-25 | End: 2023-05-27 | Stop reason: HOSPADM

## 2023-05-25 RX ORDER — PANTOPRAZOLE SODIUM 40 MG/1
40 TABLET, DELAYED RELEASE ORAL
Status: DISCONTINUED | OUTPATIENT
Start: 2023-05-26 | End: 2023-05-27 | Stop reason: HOSPADM

## 2023-05-25 RX ORDER — ENOXAPARIN SODIUM 100 MG/ML
40 INJECTION SUBCUTANEOUS DAILY
Status: DISCONTINUED | OUTPATIENT
Start: 2023-05-25 | End: 2023-05-27

## 2023-05-25 RX ORDER — FUROSEMIDE 40 MG/1
40 TABLET ORAL DAILY
Status: DISCONTINUED | OUTPATIENT
Start: 2023-05-25 | End: 2023-05-27 | Stop reason: HOSPADM

## 2023-05-25 RX ORDER — BUDESONIDE 0.5 MG/2ML
0.5 INHALANT ORAL
Status: DISCONTINUED | OUTPATIENT
Start: 2023-05-25 | End: 2023-05-27 | Stop reason: HOSPADM

## 2023-05-25 RX ORDER — INSULIN LISPRO 100 [IU]/ML
1-6 INJECTION, SOLUTION INTRAVENOUS; SUBCUTANEOUS
Status: DISCONTINUED | OUTPATIENT
Start: 2023-05-25 | End: 2023-05-27 | Stop reason: HOSPADM

## 2023-05-25 RX ORDER — MAGNESIUM HYDROXIDE/ALUMINUM HYDROXICE/SIMETHICONE 120; 1200; 1200 MG/30ML; MG/30ML; MG/30ML
30 SUSPENSION ORAL EVERY 6 HOURS PRN
Status: DISCONTINUED | OUTPATIENT
Start: 2023-05-25 | End: 2023-05-27 | Stop reason: HOSPADM

## 2023-05-25 RX ORDER — FLUTICASONE PROPIONATE 50 MCG
1 SPRAY, SUSPENSION (ML) NASAL DAILY
Status: DISCONTINUED | OUTPATIENT
Start: 2023-05-25 | End: 2023-05-27 | Stop reason: HOSPADM

## 2023-05-25 RX ORDER — LISINOPRIL 20 MG/1
40 TABLET ORAL DAILY
Status: DISCONTINUED | OUTPATIENT
Start: 2023-05-25 | End: 2023-05-27 | Stop reason: HOSPADM

## 2023-05-25 RX ORDER — CEFTRIAXONE 1 G/50ML
1000 INJECTION, SOLUTION INTRAVENOUS ONCE
Status: COMPLETED | OUTPATIENT
Start: 2023-05-25 | End: 2023-05-25

## 2023-05-25 RX ORDER — ROSUVASTATIN CALCIUM 20 MG/1
20 TABLET, COATED ORAL
Status: DISCONTINUED | OUTPATIENT
Start: 2023-05-25 | End: 2023-05-27 | Stop reason: HOSPADM

## 2023-05-25 RX ORDER — AMLODIPINE BESYLATE 5 MG/1
10 TABLET ORAL DAILY
Status: DISCONTINUED | OUTPATIENT
Start: 2023-05-25 | End: 2023-05-27

## 2023-05-25 RX ORDER — LEVALBUTEROL INHALATION SOLUTION 0.63 MG/3ML
0.63 SOLUTION RESPIRATORY (INHALATION)
Status: DISCONTINUED | OUTPATIENT
Start: 2023-05-25 | End: 2023-05-27 | Stop reason: HOSPADM

## 2023-05-25 RX ORDER — LORATADINE 10 MG/1
10 TABLET ORAL DAILY
Status: DISCONTINUED | OUTPATIENT
Start: 2023-05-25 | End: 2023-05-27 | Stop reason: HOSPADM

## 2023-05-25 RX ORDER — CEFTRIAXONE 1 G/50ML
1000 INJECTION, SOLUTION INTRAVENOUS EVERY 24 HOURS
Status: DISCONTINUED | OUTPATIENT
Start: 2023-05-26 | End: 2023-05-27 | Stop reason: HOSPADM

## 2023-05-25 RX ORDER — ONDANSETRON 2 MG/ML
4 INJECTION INTRAMUSCULAR; INTRAVENOUS EVERY 6 HOURS PRN
Status: DISCONTINUED | OUTPATIENT
Start: 2023-05-25 | End: 2023-05-27 | Stop reason: HOSPADM

## 2023-05-25 RX ORDER — LEVALBUTEROL INHALATION SOLUTION 0.63 MG/3ML
0.63 SOLUTION RESPIRATORY (INHALATION)
Status: DISCONTINUED | OUTPATIENT
Start: 2023-05-25 | End: 2023-05-25

## 2023-05-25 RX ADMIN — LORAZEPAM 0.5 MG: 0.5 TABLET ORAL at 21:48

## 2023-05-25 RX ADMIN — BUDESONIDE 0.5 MG: 0.5 INHALANT ORAL at 20:59

## 2023-05-25 RX ADMIN — IPRATROPIUM BROMIDE 0.5 MG: 0.5 SOLUTION RESPIRATORY (INHALATION) at 20:59

## 2023-05-25 RX ADMIN — INSULIN LISPRO 1 UNITS: 100 INJECTION, SOLUTION INTRAVENOUS; SUBCUTANEOUS at 17:16

## 2023-05-25 RX ADMIN — LORATADINE 10 MG: 10 TABLET ORAL at 16:47

## 2023-05-25 RX ADMIN — ENOXAPARIN SODIUM 40 MG: 100 INJECTION SUBCUTANEOUS at 16:47

## 2023-05-25 RX ADMIN — CEFTRIAXONE 1000 MG: 1 INJECTION, SOLUTION INTRAVENOUS at 13:33

## 2023-05-25 RX ADMIN — LISINOPRIL 40 MG: 20 TABLET ORAL at 16:47

## 2023-05-25 RX ADMIN — SODIUM CHLORIDE 1000 ML: 0.9 INJECTION, SOLUTION INTRAVENOUS at 10:24

## 2023-05-25 RX ADMIN — LEVALBUTEROL HYDROCHLORIDE 0.63 MG: 0.63 SOLUTION RESPIRATORY (INHALATION) at 20:59

## 2023-05-25 RX ADMIN — ASPIRIN 81 MG: 81 TABLET, COATED ORAL at 16:47

## 2023-05-25 RX ADMIN — IOHEXOL 50 ML: 350 INJECTION, SOLUTION INTRAVENOUS at 11:36

## 2023-05-25 RX ADMIN — FUROSEMIDE 40 MG: 40 TABLET ORAL at 16:47

## 2023-05-25 RX ADMIN — AMLODIPINE BESYLATE 10 MG: 5 TABLET ORAL at 16:47

## 2023-05-25 NOTE — ASSESSMENT & PLAN NOTE
69-year-old with progressive stage IVb grade 3 endometrial cancer with liver, lung, retroperitoneal lymph node metastases currently receiving palliative liposomal doxorubicin at 30 mg per metered squared  She has received 2 cycles of treatment  A dose reduction was necessary secondary to pancytopenia, TIA  She has treatment related fatigue  Current hemoglobin is 8 4 g/dL  Platelet count 717 K, serum creatinine 1 34 mg/dL  She has dyspnea even at rest   Her performance status is 3   1   Plan to stop palliative liposomal doxorubicin given quality of life concerns  2   Referral to ED for evaluation of worsening dyspnea  Likely plan for PE protocol CT with abdomen pelvis  She understands that disease progression may be causing the dyspnea  3   We discussed that she has had difficulty tolerating cytotoxic chemotherapy and has experienced pancytopenia which has led to treatment delays  We discussed the possibility of withholding further tumor directed therapy given quality of life concerns  We will discuss additional treatment planning after the CT imaging  4   Return in 1 month for evaluation-she will go on a treatment holiday for now

## 2023-05-25 NOTE — TREATMENT PLAN
I saw the patient in the office today  She had significant dyspnea on exertion and at rest   She presented to the ED where chest x-ray and PE protocol CT scan were performed  She has extensive pulmonary metastatic disease causing her dyspnea  I reviewed the imaging findings in detail with her  She understands that the risks of additional treatment outweigh the benefit  I recommended stopping all tumor directed therapy and engaging with hospice  She is agreeable to the plan  We will also consult palliative care to assist with management of dyspnea from her pulmonary metastases

## 2023-05-25 NOTE — ASSESSMENT & PLAN NOTE
Follows with Dr Randa Russell  Has been receiving chemotherapy, however not tolerating it very well  CT imaging today shows progression of metastatic disease  Consult heme/onc

## 2023-05-25 NOTE — ASSESSMENT & PLAN NOTE
"Lab Results   Component Value Date    HGBA1C 6 4 (H) 04/14/2023       No results for input(s): \"POCGLU\" in the last 72 hours      Blood Sugar Average: Last 72 hrs:  Home regimen: Januvia and metformin  Hold oral antihyperglycemic's while inpatient  Start sliding scale insulin  Diabetic diet  "

## 2023-05-25 NOTE — ASSESSMENT & PLAN NOTE
Patient presents with progressively worsening shortness of breath over the last couple of months  Now only able to ambulate couple of feet prior to requiring rest   · Hypoxic in the 80s today, currently requiring 4 L via nasal cannula  · Lungs clear to auscultation, no rales/wheezing  · CTA chest- no PE  Innumerable large nodules/masses throughout both lung fields, significantly increased in size and number when compared with the prior CT study compatible with significantly progressed metastatic disease  · Suspect secondary to progression of metastatic disease  Allergies may also be contributing as patient reports scratchy throat and nasal congestion    · Will trial nebulizers for symptomatic relief  · Claritin and Flonase for allergic symptoms  · Wean O2 as able, suspect that she will likely require oxygen on discharge

## 2023-05-25 NOTE — H&P
"[place]  H&P  Name: Nicho Chandler 75 y o  female I MRN: [id number]  Unit/Bed#: QUITA I Date of Admission: 5/25/2023   Date of Service: 5/25/2023 I Hospital Day: 0      Assessment/Plan   * Respiratory failure with hypoxia [facility])  Assessment & Plan  Patient presents with progressively worsening shortness of breath over the last couple of months  Now only able to ambulate couple of feet prior to requiring rest   · Hypoxic in the 80s today, currently requiring 4 L via nasal cannula  · Lungs clear to auscultation, no rales/wheezing  · CTA chest- no PE  Innumerable large nodules/masses throughout both lung fields, significantly increased in size and number when compared with the prior CT study compatible with significantly progressed metastatic disease  · Suspect secondary to progression of metastatic disease  Allergies may also be contributing as patient reports scratchy throat and nasal congestion  · Will trial nebulizers for symptomatic relief  · Claritin and Flonase for allergic symptoms  · Wean O2 as able, suspect that she will likely require oxygen on discharge    CVA (cerebral vascular accident) [facility])  Assessment & Plan  Continue ASA 81 mg daily  Did not tolerate Lipitor 40 mg, back on her rosuvastatin 20 mg daily    UTI (urinary tract infection)  Assessment & Plan  UA with possible infection  Immunosuppressed due to chemotherapy  We will empirically treat with ceftriaxone x5 days    HTN (hypertension)  Assessment & Plan  Continue amlodipine, lisinopril, Lasix    Type 2 diabetes mellitus without complication (HCC)  Assessment & Plan  Lab Results   Component Value Date    HGBA1C 6 4 (H) 04/14/2023       No results for input(s): \"POCGLU\" in the last 72 hours      Blood Sugar Average: Last 72 hrs:  Home regimen: Januvia and metformin  Hold oral antihyperglycemic's while inpatient  Start sliding scale insulin  Diabetic diet    Endometrial cancer [facility])  Assessment & Plan  Follows with " Dr Catarina Goldmann  Has been receiving chemotherapy, however not tolerating it very well  CT imaging today shows progression of metastatic disease  Consult heme/onc       VTE Pharmacologic Prophylaxis: VTE Score: 8 High Risk (Score >/= 5) - Pharmacological DVT Prophylaxis Ordered: enoxaparin (Lovenox)  Sequential Compression Devices Ordered  Code Status: Prior level 1 full code  Discussion with family: Updated  () at bedside  Anticipated Length of Stay: Patient will be admitted on an inpatient basis with an anticipated length of stay of greater than 2 midnights secondary to hypoxia  Total Time Spent on Date of Encounter in care of patient: 65 minutes This time was spent on one or more of the following: performing physical exam; counseling and coordination of care; obtaining or reviewing history; documenting in the medical record; reviewing/ordering tests, medications or procedures; communicating with other healthcare professionals and discussing with patient's family/caregivers  Chief Complaint: Shortness of breath    History of Present Illness:  Mario Addison is a 75 y o  female with a PMH of metastatic endometrial cancer, HLD, HTN, T2DM who presents with progressively worsening shortness of breath  Patient reports that she has been feeling short of breath for several months, however has recently progressed significantly to the point where it is impacting her day-to-day activities  She is currently unable to ambulate more than several feet prior to requesting rest   She was seen at her oncologist office who noted that patient was hypoxic and sent to the emergency department  On arrival to the ED, she is hemodynamically stable, however is requiring 4 L via nasal cannula to maintain sats greater than 90%  Underwent CTA chest which showed no pulmonary embolism, however did show significant progression of metastatic disease in the lungs  She will be admitted for further work-up    Review of Systems:  Review of Systems   Constitutional: Negative for appetite change, chills, fatigue and fever  HENT: Negative for ear pain, sore throat and trouble swallowing  Eyes: Negative for visual disturbance  Respiratory: Positive for cough and shortness of breath  Negative for chest tightness and wheezing  Cardiovascular: Negative for chest pain, palpitations and leg swelling  Gastrointestinal: Negative for abdominal distention, abdominal pain, diarrhea, nausea and vomiting  Endocrine: Negative  Genitourinary: Negative for dysuria, flank pain and hematuria  Musculoskeletal: Negative for arthralgias, gait problem and myalgias  Skin: Negative for pallor  Allergic/Immunologic: Negative for immunocompromised state  Neurological: Negative for dizziness, syncope, light-headedness, numbness and headaches         Past Medical and Surgical History:   Past Medical History:   Diagnosis Date   • Anxiety    • Arthritis    • Back pain    • Cancer ([place] [address] )    • Depression    • Diabetes mellitus ([place] [address] )    • Disease of thyroid gland    • Endometrial cancer ([place] [address] )    • GERD (gastroesophageal reflux disease)    • Hyperlipidemia associated with type 2 diabetes mellitus ([place] [address] )    • Hypertension    • Morbid obesity with BMI of 40 0-44 9, adult ([place] [address] )    • Type 2 diabetes mellitus without complication ([place] [address] )    • Urinary incontinence    • Wears glasses        Past Surgical History:   Procedure Laterality Date   • ABDOMINAL ADHESION SURGERY N/A 5/31/2022    Procedure: Wilfredo Gomez;  Surgeon: Bisi Vyas MD;  Location: [facility];  Service: Gynecology Oncology   • CATARACT EXTRACTION W/ INTRAOCULAR LENS IMPLANT Bilateral    • CHOLECYSTECTOMY OPEN     • CYSTOSCOPY N/A 5/31/2022    Procedure: Andy Paget;  Surgeon: Bisi Vyas MD;  Location: [facility];  Service: Gynecology Oncology   • IR PORT PLACEMENT  03/07/2022   • ME LAPS TOTAL HYSTERECT 250 GM/< W/RMVL TUBE/OVARY N/A 5/31/2022    Procedure: ROBOTIC ASSISTED TOTAL LAPAROSCOPIC HYSTERECTOMY, RIGHT SALPINGO-OOPHORECTOMY, LEFT SALPINGECTOMY,;  Surgeon: Eren Nagy MD;  Location: [facility];  Service: Gynecology Oncology   • SALPINGOOPHORECTOMY N/A     only had one removed and not sure which   • TONSILLECTOMY         Meds/Allergies:  Prior to Admission medications    Medication Sig Start Date End Date Taking? Authorizing Provider   acetaminophen (TYLENOL) 650 mg CR tablet Take 1,300 mg by mouth in the morning and 1,300 mg before bedtime      Historical Provider, MD zurita mag oxide-diphenhydramine-lidocaine viscous (MAGIC MOUTHWASH) 1:1:1 suspension Swish and spit 10 mL every 4 (four) hours as needed for mouth pain or discomfort 12/21/22   Dylon Rainey PA-C   amLODIPine (NORVASC) 10 mg tablet  10/14/22   Historical Provider, MD   aspirin (ECOTRIN LOW STRENGTH) 81 mg EC tablet Take 1 tablet (81 mg total) by mouth daily Do not start before April 17, 2023 4/17/23 5/17/23  Dre Bar MD   atorvastatin (LIPITOR) 40 mg tablet Take 1 tablet (40 mg total) by mouth daily with dinner  Patient not taking: Reported on 5/25/2023 4/16/23 5/16/23  Dre Bar MD   BD Pen Needle Yoana 2nd Gen 32G X 4 MM MISC 2 (two) times a day 2/22/22   Historical Provider, MD   clopidogrel (PLAVIX) 75 mg tablet Take 1 tablet (75 mg total) by mouth daily for 21 days Do not start before April 17, 2023 4/17/23 5/8/23  Dre Bar MD   Contour Next Test test strip 2 (two) times a day Test 4/18/22   Historical Provider, MD   fexofenadine (ALLEGRA) 180 MG tablet every 24 hours    Historical Provider, MD   furosemide (LASIX) 40 mg tablet Take 1 tablet by mouth daily    Historical Provider, MD [place], 10 MG Daily Dose, 10 MG CPPK TAKE 1 CAPSULE BY MOUTH DAILY 1/27/23   Albino Smith PA-C   lidocaine-prilocaine (EMLA) cream Apply to port site 30 min prior to labs/chemo 3/10/22   Dylon Rainey PA-C   lisinopril (ZESTRIL) 40 mg tablet Take 1 tablet (40 mg total) by mouth daily 9/14/22   Rm Ruffin MD   LORazepam (ATIVAN) 0 5 mg tablet Take 0 5 mg by mouth in the morning and 0 5 mg in the evening  3/25/22   Historical Provider, MD   metFORMIN (GLUCOPHAGE) 1000 MG tablet Take 1 tablet by mouth 2 (two) times a day with meals    Historical Provider, MD   Microlet Lancets MISC 2 (two) times a day Test 4/18/22   Historical Provider, MD   omeprazole (PriLOSEC) 20 mg delayed release capsule every 24 hours    Historical Provider, MD   ondansetron (ZOFRAN) 8 mg tablet Take 1 tablet (8 mg total) by mouth every 8 (eight) hours as needed for nausea or vomiting 2/27/23   Prakash Rainey PA-C   sitaGLIPtin (JANUVIA) 100 mg tablet Take 1 tablet by mouth daily    Historical Provider, MD Babar Major 300 units/mL CONCENTRATED U-300 injection pen (1-unit dial) every 12 (twelve) hours 34 units in the am and 36 units at night 4/4/22   Historical Provider, MD   apixaban (Eliquis) 2 5 mg Take 1 tablet (2 5 mg total) by mouth 2 (two) times a day for 28 days 5/31/22 5/31/22  Julio Cabrera MD     I have reviewed home medications with patient personally  Allergies:    Allergies   Allergen Reactions   • Empagliflozin Other (See Comments)     Yeast infection   • Exenatide Nausea Only   • Glipizide Other (See Comments)     hypoglycemic   • Repaglinide Nausea Only       Social History:  Marital Status: /Civil Union   Occupation: Noncontributory  Patient Pre-hospital Living Situation: Home  Patient Pre-hospital Level of Mobility: Difficulty ambulating secondary to shortness of breath  Patient Pre-hospital Diet Restrictions: Diabetic  Substance Use History:   Social History     Substance and Sexual Activity   Alcohol Use Not Currently     Social History     Tobacco Use   Smoking Status Never   • Passive exposure: Never   Smokeless Tobacco Never     Social History     Substance and Sexual Activity   Drug Use Never       Family "History:  Family History   Problem Relation Age of Onset   • Pancreatic cancer Father    • Colon cancer Maternal Aunt        Physical Exam:     Vitals:   Blood Pressure: 129/57 (05/25/23 1400)  Pulse: 103 (05/25/23 1400)  Temperature: 98 3 °F (36 8 °C) (05/25/23 0922)  Temp Source: Oral (05/25/23 0922)  Respirations: (!) 24 (05/25/23 1400)  Height: 5' 2\" (157 5 cm) (05/25/23 2429)  Weight - Scale: 84 8 kg (187 lb) (05/25/23 0922)  SpO2: 93 % (05/25/23 1400)    Physical Exam  Vitals and nursing note reviewed  Constitutional:       Appearance: Normal appearance  HENT:      Head: Normocephalic and atraumatic  Mouth/Throat:      Mouth: Mucous membranes are moist       Pharynx: Oropharynx is clear  No oropharyngeal exudate  Eyes:      Extraocular Movements: Extraocular movements intact  Cardiovascular:      Rate and Rhythm: Normal rate and regular rhythm  Pulses: Normal pulses  Heart sounds: Normal heart sounds  No murmur heard  No friction rub  No gallop  Pulmonary:      Effort: Pulmonary effort is normal  No respiratory distress  Breath sounds: Normal breath sounds  No stridor  No wheezing or rales  Abdominal:      General: Abdomen is flat  Bowel sounds are normal  There is no distension  Palpations: Abdomen is soft  Tenderness: There is no abdominal tenderness  Musculoskeletal:      Right lower leg: No edema  Left lower leg: No edema  Skin:     General: Skin is warm and dry  Neurological:      General: No focal deficit present  Mental Status: She is alert and oriented to person, place, and time           Additional Data:     Lab Results:  Results from last 7 days   Lab Units 05/25/23  1018   BANDS PCT % 2   EOS PCT % 0   HEMATOCRIT % 29 3*   HEMOGLOBIN g/dL 8 9*   LYMPHO PCT % 12*   MONO PCT % 11   PLATELETS Thousands/uL 109*   WBC Thousand/uL 5 40     Results from last 7 days   Lab Units 05/25/23  1018   ANION GAP mmol/L 12   ALBUMIN g/dL 3 3*   ALK PHOS " U/L 109*   ALT U/L 8   AST U/L 16   BUN mg/dL 35*   CALCIUM mg/dL 9 4   CHLORIDE mmol/L 103   CO2 mmol/L 19*   CREATININE mg/dL 1 15   GLUCOSE RANDOM mg/dL 168*   POTASSIUM mmol/L 4 9   SODIUM mmol/L 134*   TOTAL BILIRUBIN mg/dL 0 70     Results from last 7 days   Lab Units 05/25/23  1018   INR  1 14             Results from last 7 days   Lab Units 05/25/23  1018   LACTIC ACID mmol/L 1 5   PROCALCITONIN ng/ml 1 07*       Lines/Drains:  Invasive Devices     Central Venous Catheter Line  Duration           Port A Cath 03/07/22 Right Subclavian 444 days          Peripheral Intravenous Line  Duration           Peripheral IV 04/15/23 Left Antecubital 39 days                Central Line:  Goal for removal: N/A - Chronic PICC           Imaging: Reviewed radiology reports from this admission including: CTA chest  CTA ED chest PE study   Final Result by Jean Peters MD (05/25 1204)      No definite CT evidence of pulmonary embolism  Innumerable large nodules/masses throughout both lung fields, significantly increased in size and number when compared with the prior CT study compatible with significantly progressed metastatic disease  Trace loculated pleural effusions posteriorly  Worsening mediastinal and left supraclavicular lymphadenopathy concerning for progressive metastatic disease  Hepatic metastatic disease partially visualized  Workstation performed: [id number]         XR chest 2 views   ED Interpretation by Aliya Montalvo PA-C (05/25 1113)   Multiple round opacities b/l lungs, most likely related to mets  Final Result by Ave Gilford, MD (05/25 1325)      Worsening diffuse bilateral pulmonary metastases  Workstation performed: [id number]             EKG and Other Studies Reviewed on Admission:   · EKG: NSR  , RBBB  ** Please Note: This note has been constructed using a voice recognition system   **

## 2023-05-25 NOTE — ED PROVIDER NOTES
History  Chief Complaint   Patient presents with   • Shortness of Breath     Patient presents to the ER with SOB  Patient is a 74 y/o F with h/o endometrial cancer with mets to liver, lung and retroperitoneal lymph nodes, DM, HTN that presents to the ED with SOB x 1 month  Patient is currently on palliative liposomal doxorubicin  She states since starting it she has been very fatigued and SOB  About 1 month ago she was found to have low Hg and received blood transfusion, but states her SOB persisted  She denies abdominal pain, nausea, vomiting, blood in stools  She has a chronic cough  No dizziness or lightheadedness  She had a CBC on  showing Hg to be 8 4  No leg pain or swelling  She states her left leg is always larger than her right leg  No fevers, chills  She was seen by her oncologist today and was told to go to the ED for further evaluation of SOB  History provided by:  Patient  Shortness of Breath  Severity:  Moderate  Onset quality:  Gradual  Duration:  1 month  Timing:  Constant  Progression:  Worsening  Chronicity:  New  Context: not URI    Relieved by:  Nothing  Worsened by:  Exertion  Ineffective treatments: blood transfusion  Associated symptoms: cough    Associated symptoms: no abdominal pain, no chest pain, no fever, no headaches, no neck pain, no rash, no sore throat, no vomiting and no wheezing    Risk factors: hx of cancer and obesity    Risk factors: no hx of PE/DVT and no tobacco use        Prior to Admission Medications   Prescriptions Last Dose Informant Patient Reported? Taking? BD Pen Needle Yoana 2nd Gen 32G X 4 MM MISC  Self Yes No   Si (two) times a day   Contour Next Test test strip  Self Yes No   Si (two) times a day Test   LORazepam (ATIVAN) 0 5 mg tablet  Self Yes No   Sig: Take 0 5 mg by mouth in the morning and 0 5 mg in the evening     Lenvima, 10 MG Daily Dose, 10 MG CPPK   No No   Sig: TAKE 1 CAPSULE BY MOUTH DAILY   Microlet Lancets MISC  Self Yes No   Si (two) times a day Test   Toujeo SoloStar 300 units/mL CONCENTRATED U-300 injection pen (1-unit dial)  Self Yes No   Sig: every 12 (twelve) hours 34 units in the am and 36 units at night   acetaminophen (TYLENOL) 650 mg CR tablet  Self Yes No   Sig: Take 1,300 mg by mouth in the morning and 1,300 mg before bedtime  al mag oxide-diphenhydramine-lidocaine viscous (MAGIC MOUTHWASH) 1:1:1 suspension   No No   Sig: Swish and spit 10 mL every 4 (four) hours as needed for mouth pain or discomfort   amLODIPine (NORVASC) 10 mg tablet   Yes No   aspirin (ECOTRIN LOW STRENGTH) 81 mg EC tablet   No No   Sig: Take 1 tablet (81 mg total) by mouth daily Do not start before 2023  atorvastatin (LIPITOR) 40 mg tablet   No No   Sig: Take 1 tablet (40 mg total) by mouth daily with dinner   Patient not taking: Reported on 2023   clopidogrel (PLAVIX) 75 mg tablet   No No   Sig: Take 1 tablet (75 mg total) by mouth daily for 21 days Do not start before 2023     fexofenadine (ALLEGRA) 180 MG tablet  Self Yes No   Sig: every 24 hours   furosemide (LASIX) 40 mg tablet  Self Yes No   Sig: Take 1 tablet by mouth daily   lidocaine-prilocaine (EMLA) cream  Self No No   Sig: Apply to port site 30 min prior to labs/chemo   lisinopril (ZESTRIL) 40 mg tablet   No No   Sig: Take 1 tablet (40 mg total) by mouth daily   metFORMIN (GLUCOPHAGE) 1000 MG tablet  Self Yes No   Sig: Take 1 tablet by mouth 2 (two) times a day with meals   omeprazole (PriLOSEC) 20 mg delayed release capsule  Self Yes No   Sig: every 24 hours   ondansetron (ZOFRAN) 8 mg tablet   No No   Sig: Take 1 tablet (8 mg total) by mouth every 8 (eight) hours as needed for nausea or vomiting   sitaGLIPtin (JANUVIA) 100 mg tablet  Self Yes No   Sig: Take 1 tablet by mouth daily      Facility-Administered Medications: None       Past Medical History:   Diagnosis Date   • Anxiety    • Arthritis    • Back pain    • Cancer (HCC)    • Depression    • Diabetes mellitus ([place] [address] )    • Disease of thyroid gland    • Endometrial cancer ([place] [address] )    • GERD (gastroesophageal reflux disease)    • Hyperlipidemia associated with type 2 diabetes mellitus ([place] [address] )    • Hypertension    • Morbid obesity with BMI of 40 0-44 9, adult ([place] [address] )    • Type 2 diabetes mellitus without complication ([place] [address] )    • Urinary incontinence    • Wears glasses        Past Surgical History:   Procedure Laterality Date   • ABDOMINAL ADHESION SURGERY N/A 5/31/2022    Procedure: Rigo Gao;  Surgeon: Justin Clay MD;  Location: [facility];  Service: Gynecology Oncology   • CATARACT EXTRACTION W/ INTRAOCULAR LENS IMPLANT Bilateral    • CHOLECYSTECTOMY OPEN     • CYSTOSCOPY N/A 5/31/2022    Procedure: Luly Groves;  Surgeon: Justin Clay MD;  Location: [facility];  Service: Gynecology Oncology   • IR PORT PLACEMENT  03/07/2022   • OK LAPS TOTAL HYSTERECT 250 GM/< W/RMVL TUBE/OVARY N/A 5/31/2022    Procedure: ROBOTIC ASSISTED TOTAL LAPAROSCOPIC HYSTERECTOMY, RIGHT SALPINGO-OOPHORECTOMY, LEFT SALPINGECTOMY,;  Surgeon: Justin Clya MD;  Location: [facility];  Service: Gynecology Oncology   • SALPINGOOPHORECTOMY N/A     only had one removed and not sure which   • TONSILLECTOMY         Family History   Problem Relation Age of Onset   • Pancreatic cancer Father    • Colon cancer Maternal Aunt      I have reviewed and agree with the history as documented  E-Cigarette/Vaping   • E-Cigarette Use Never User      E-Cigarette/Vaping Substances     Social History     Tobacco Use   • Smoking status: Never     Passive exposure: Never   • Smokeless tobacco: Never   Vaping Use   • Vaping Use: Never used   Substance Use Topics   • Alcohol use: Not Currently   • Drug use: Never       Review of Systems   Constitutional: Positive for fatigue  Negative for chills and fever  HENT: Negative for congestion and sore throat      Respiratory: Positive for cough and shortness of breath  Negative for wheezing  Cardiovascular: Negative for chest pain, palpitations and leg swelling  Gastrointestinal: Negative for abdominal pain, diarrhea, nausea and vomiting  Genitourinary: Negative for dysuria  Musculoskeletal: Negative for back pain and neck pain  Skin: Negative for rash  Neurological: Positive for weakness  Negative for dizziness, facial asymmetry, light-headedness, numbness and headaches  Psychiatric/Behavioral: Negative for confusion  All other systems reviewed and are negative  Physical Exam  Physical Exam  Vitals and nursing note reviewed  Constitutional:       General: She is not in acute distress  Appearance: Normal appearance  She is well-developed, well-groomed and overweight  She is not ill-appearing or diaphoretic  HENT:      Head: Normocephalic and atraumatic  Right Ear: External ear normal       Left Ear: External ear normal       Nose: Nose normal       Mouth/Throat:      Mouth: Mucous membranes are moist       Pharynx: Oropharynx is clear  Eyes:      Conjunctiva/sclera: Conjunctivae normal       Pupils: Pupils are equal    Cardiovascular:      Rate and Rhythm: Regular rhythm  Tachycardia present  Heart sounds: Normal heart sounds  Pulmonary:      Effort: Pulmonary effort is normal       Breath sounds: Examination of the right-lower field reveals rales  Examination of the left-lower field reveals rales  Rales present  No decreased breath sounds or wheezing  Abdominal:      General: Abdomen is flat  Bowel sounds are normal       Palpations: Abdomen is soft  Tenderness: There is no abdominal tenderness  There is no guarding or rebound  Musculoskeletal:         General: Normal range of motion  Cervical back: Normal range of motion and neck supple  Right lower leg: Edema (trace) present  Left lower leg: Edema (trace) present  Comments: Left leg slightly larger than right leg, chronic per patient      Skin: General: Skin is warm and dry  Coloration: Skin is pale  Skin is not jaundiced  Findings: No rash  Neurological:      General: No focal deficit present  Mental Status: She is alert and oriented to person, place, and time  Cranial Nerves: No cranial nerve deficit  Motor: No weakness  Psychiatric:         Mood and Affect: Mood normal          Behavior: Behavior is cooperative           Vital Signs  ED Triage Vitals   Temperature Pulse Respirations Blood Pressure SpO2   05/25/23 0922 05/25/23 0922 05/25/23 0922 05/25/23 0922 05/25/23 0922   98 3 °F (36 8 °C) (!) 109 20 133/57 (!) 87 %      Temp Source Heart Rate Source Patient Position - Orthostatic VS BP Location FiO2 (%)   05/25/23 0922 05/25/23 0922 05/25/23 1100 05/25/23 1100 --   Oral Monitor Sitting Left arm       Pain Score       --                  Vitals:    05/25/23 1100 05/25/23 1200 05/25/23 1300 05/25/23 1400   BP: 110/53 124/60 130/58 129/57   Pulse: 96 97 98 103   Patient Position - Orthostatic VS: Sitting            Visual Acuity      ED Medications  Medications   sodium chloride 0 9 % bolus 1,000 mL (0 mL Intravenous Stopped 5/25/23 1206)   iohexol (OMNIPAQUE) 350 MG/ML injection (MULTI-DOSE) 50 mL (50 mL Intravenous Given 5/25/23 1136)   cefTRIAXone (ROCEPHIN) IVPB (premix in dextrose) 1,000 mg 50 mL (0 mg Intravenous Stopped 5/25/23 1406)       Diagnostic Studies  Results Reviewed     Procedure Component Value Units Date/Time    HS Troponin I 2hr [[id number]]  (Normal) Collected: 05/25/23 1227    Lab Status: Final result Specimen: Blood from Line, Venous Updated: 05/25/23 1311     hs TnI 2hr 5 ng/L      Delta 2hr hsTnI 0 ng/L     Urine Microscopic [[id number]]  (Abnormal) Collected: 05/25/23 1227    Lab Status: Final result Specimen: Urine, Clean Catch Updated: 05/25/23 1301     RBC, UA 0-1 /hpf      WBC, UA 20-30 /hpf      Epithelial Cells Occasional /hpf      Bacteria, UA Occasional /hpf      OTHER OBSERVATIONS Renal Epithelial Cells Present  Renal Tubule Epithelial Cells Present    Urine culture [[id number]] Collected: 05/25/23 1227    Lab Status: In process Specimen: Urine, Clean Catch Updated: 05/25/23 1301    UA w Reflex to Microscopic w Reflex to Culture [[id number]]  (Abnormal) Collected: 05/25/23 1227    Lab Status: Final result Specimen: Urine, Clean Catch Updated: 05/25/23 1252     Color, UA Light Yellow     Clarity, UA Slightly Cloudy     Specific Gravity, UA <1 005     pH, UA 6 0     Leukocytes, UA Large     Nitrite, UA Negative     Protein, UA 30 (1+) mg/dl      Glucose, UA Negative mg/dl      Ketones, UA Negative mg/dl      Urobilinogen, UA <2 0 mg/dl      Bilirubin, UA Negative     Occult Blood, UA Moderate    Path Slide Review [[id number]] Collected: 05/25/23 1018    Lab Status:  In process Specimen: Blood from Central Venous Line Updated: 05/25/23 1232    CBC and differential [[id number]]  (Abnormal) Collected: 05/25/23 1018    Lab Status: Final result Specimen: Blood from Central Venous Line Updated: 05/25/23 1226     WBC 5 40 Thousand/uL      RBC 3 54 Million/uL      Hemoglobin 8 9 g/dL      Hematocrit 29 3 %      MCV 83 fL      MCH 25 1 pg      MCHC 30 4 g/dL      RDW 21 6 %      Platelets 567 Thousands/uL     Narrative:      No Clots    Manual Differential(PHLEBS Do Not Order) [[id number]]  (Abnormal) Collected: 05/25/23 1018    Lab Status: Edited Specimen: Blood from Central Venous Line Updated: 05/25/23 1226     Segmented % 68 %      Bands % 2 %      Lymphocytes % 12 %      Monocytes % 11 %      Eosinophils, % 0 %      Basophils % 1 %      Metamyelocytes% 1 %      Myelocytes % 1 %      Blasts % 1 %      Atypical Lymphocytes % 3 %      Absolute Neutrophils 3 78 Thousand/uL      Lymphocytes Absolute 0 65 Thousand/uL      Monocytes Absolute 0 59 Thousand/uL      Eosinophils Absolute 0 00 Thousand/uL      Basophils Absolute 0 05 Thousand/uL      Total Counted --     nRBC 8 /100 WBC      RBC Morphology Present Anisocytosis Present     Hypochromia Present     Microcytes Present     Polychromasia Present     Schistocytes Present     Tear Drop Cells Present     Platelet Estimate Borderline     Differential Comment see note    Narrative:      No Clots    COVID/FLU/RSV [[id number]]  (Normal) Collected: 05/25/23 1035    Lab Status: Final result Specimen: Nasopharyngeal Swab Updated: 05/25/23 1139     SARS-CoV-2 Negative     INFLUENZA A PCR Negative     INFLUENZA B PCR Negative     RSV PCR Negative    Narrative:      FOR PEDIATRIC PATIENTS - copy/paste COVID Guidelines URL to browser: [email or web address]  [email or web address]    SARS-CoV-2 assay is a Nucleic Acid Amplification assay intended for the  qualitative detection of nucleic acid from SARS-CoV-2 in nasopharyngeal  swabs  Results are for the presumptive identification of SARS-CoV-2 RNA  Positive results are indicative of infection with SARS-CoV-2, the virus  causing COVID-19, but do not rule out bacterial infection or co-infection  with other viruses  Laboratories within the [place] and its  territories are required to report all positive results to the appropriate  public health authorities  Negative results do not preclude SARS-CoV-2  infection and should not be used as the sole basis for treatment or other  patient management decisions  Negative results must be combined with  clinical observations, patient history, and epidemiological information  This test has not been FDA cleared or approved  This test has been authorized by FDA under an Emergency Use Authorization  (EUA)  This test is only authorized for the duration of time the  declaration that circumstances exist justifying the authorization of the  emergency use of an in vitro diagnostic tests for detection of SARS-CoV-2  virus and/or diagnosis of COVID-19 infection under section 564(b)(1) of  the Act, 21 U  S C  778MOG-4(Q)(2), unless the authorization is terminated  or revoked sooner  The test has been validated but independent review by FDA  and CLIA is pending  Test performed using Android App [employer] GeneXpert: This RT-PCR assay targets N2,  a region unique to SARS-CoV-2  A conserved region in the E-gene was chosen  for pan-Sarbecovirus detection which includes SARS-CoV-2  According to CMS-2020-01-R, this platform meets the definition of high-throughput technology  B-Type Natriuretic Peptide(BNP) [[id number]]  (Normal) Collected: 05/25/23 1018    Lab Status: Final result Specimen: Blood from Central Venous Line Updated: 05/25/23 1119     BNP 36 pg/mL     Protime-INR [[id number]]  (Abnormal) Collected: 05/25/23 1018    Lab Status: Final result Specimen: Blood from Central Venous Line Updated: 05/25/23 1118     Protime 15 3 seconds      INR 1 14    APTT [[id number]]  (Normal) Collected: 05/25/23 1018    Lab Status: Final result Specimen: Blood from Central Venous Line Updated: 05/25/23 1118     PTT 28 seconds     D-Dimer [[id number]]  (Abnormal) Collected: 05/25/23 1018    Lab Status: Final result Specimen: Blood from Central Venous Line Updated: 05/25/23 1118     D-Dimer, Quant 2 19 ug/ml FEU     Narrative: In the evaluation for possible pulmonary embolism, in the appropriate (Well's Score of 4 or less) patient, the age adjusted d-dimer cutoff for this patient can be calculated as:    Age x 0 01 (in ug/mL) for Age-adjusted D-dimer exclusion threshold for a patient over 50 years      Procalcitonin [[id number]]  (Abnormal) Collected: 05/25/23 1018    Lab Status: Final result Specimen: Blood from Central Venous Line Updated: 05/25/23 1108     Procalcitonin 1 07 ng/ml     HS Troponin 0hr (reflex protocol) [[id number]]  (Normal) Collected: 05/25/23 1018    Lab Status: Final result Specimen: Blood from Central Venous Line Updated: 05/25/23 1105     hs TnI 0hr 5 ng/L     Lactic acid, plasma (w/reflex if result > 2 0) [[id number]]  (Normal) Collected: 05/25/23 1018    Lab Status: Final result Specimen: Blood from Central Venous Line Updated: 05/25/23 1100     LACTIC ACID 1 5 mmol/L     Narrative:      Result may be elevated if tourniquet was used during collection  Comprehensive metabolic panel [[id number]]  (Abnormal) Collected: 05/25/23 1018    Lab Status: Final result Specimen: Blood from Central Venous Line Updated: 05/25/23 1059     Sodium 134 mmol/L      Potassium 4 9 mmol/L      Chloride 103 mmol/L      CO2 19 mmol/L      ANION GAP 12 mmol/L      BUN 35 mg/dL      Creatinine 1 15 mg/dL      Glucose 168 mg/dL      Calcium 9 4 mg/dL      Corrected Calcium 10 0 mg/dL      AST 16 U/L      ALT 8 U/L      Alkaline Phosphatase 109 U/L      Total Protein 6 1 g/dL      Albumin 3 3 g/dL      Total Bilirubin 0 70 mg/dL      eGFR 49 ml/min/1 73sq m     Narrative:      [place] guidelines for Chronic Kidney Disease (CKD):   •  Stage 1 with normal or high GFR (GFR > 90 mL/min/1 73 square meters)  •  Stage 2 Mild CKD (GFR = 60-89 mL/min/1 73 square meters)  •  Stage 3A Moderate CKD (GFR = 45-59 mL/min/1 73 square meters)  •  Stage 3B Moderate CKD (GFR = 30-44 mL/min/1 73 square meters)  •  Stage 4 Severe CKD (GFR = 15-29 mL/min/1 73 square meters)  •  Stage 5 End Stage CKD (GFR <15 mL/min/1 73 square meters)  Note: GFR calculation is accurate only with a steady state creatinine    Blood culture #2 [[id number]] Collected: 05/25/23 1018    Lab Status: In process Specimen: Blood from Arm, Right Updated: 05/25/23 1038    Blood culture #1 [[id number]] Collected: 05/25/23 1018    Lab Status: In process Specimen: Blood from Central Venous Line Updated: 05/25/23 1037                 CTA ED chest PE study   Final Result by Jonn Sawyer MD (05/25 1204)      No definite CT evidence of pulmonary embolism        Innumerable large nodules/masses throughout both lung fields, significantly increased in size and number when compared with the prior CT study compatible with significantly progressed metastatic disease  Trace loculated pleural effusions posteriorly  Worsening mediastinal and left supraclavicular lymphadenopathy concerning for progressive metastatic disease  Hepatic metastatic disease partially visualized  Workstation performed: [id number]         XR chest 2 views   ED Interpretation by Queen Lilo PA-C (05/25 1113)   Multiple round opacities b/l lungs, most likely related to mets  Final Result by Adriane Giraldo MD (05/25 1325)      Worsening diffuse bilateral pulmonary metastases  Workstation performed: [id number]                    Procedures  ECG 12 Lead Documentation Only    Date/Time: 5/25/2023 9:50 AM    Performed by: Queen Lilo PA-C  Authorized by: Queen Lilo PA-C    Indications / Diagnosis:  Tachycardia  ECG reviewed by me, the ED Provider: yes    Patient location:  ED  Previous ECG:     Previous ECG:  Compared to current    Similarity:  No change  Rate:     ECG rate:  102    ECG rate assessment: tachycardic    Rhythm:     Rhythm: sinus tachycardia    Conduction:     Conduction: abnormal      Abnormal conduction: complete RBBB               ED Course  ED Course as of 05/25/23 1431   Thu May 25, 2023   1027 Patient currently on 4L NC, pulse ox 92%  [facility] paged for admission  SBIRT 22yo+    Flowsheet Row Most Recent Value   Initial Alcohol Screen: US AUDIT-C     1  How often do you have a drink containing alcohol? 0 Filed at: 05/25/2023 1115   2  How many drinks containing alcohol do you have on a typical day you are drinking? 0 Filed at: 05/25/2023 1115   3a  Male UNDER 65: How often do you have five or more drinks on one occasion? 0 Filed at: 05/25/2023 1115   3b  FEMALE Any Age, or MALE 65+: How often do you have 4 or more drinks on one occassion?  0 Filed at: 05/25/2023 1115   Audit-C Score 0 Filed at: 05/25/2023 1115   EDGARDO: How many times in the past year have you    Used an illegal drug or used a prescription medication for non-medical reasons? Never Filed at: 05/25/2023 1115          Wells' Criteria for PE    Flowsheet Row Most Recent Value   Wells' Criteria for PE    Clinical signs and symptoms of DVT 0 Filed at: 05/25/2023 1024   PE is primary diagnosis or equally likely 3 Filed at: 05/25/2023 1024   HR >100 1 5 Filed at: 05/25/2023 1024   Immobilization at least 3 days or Surgery in the previous 4 weeks 0 Filed at: 05/25/2023 1024   Previous, objectively diagnosed PE or DVT 0 Filed at: 05/25/2023 1024   Hemoptysis 0 Filed at: 05/25/2023 1024   Malignancy with treatment within 6 months or palliative 1 Filed at: 05/25/2023 1024   Anurag' Criteria Total 5 5 Filed at: 05/25/2023 1024                Medical Decision Making  Patient with hypoxia, SOB, will order labs, EKG, CT scan to r/o cardiopulmonary disease  DDimer elevated, wells score over 5, will order CT to r/o PE  Patient with worsening mets, UTI, will admit for IV abx and further monitoring  Hypoxia: acute illness or injury  Metastatic cancer [facility]): chronic illness or injury  SOB (shortness of breath): acute illness or injury  UTI (urinary tract infection): acute illness or injury  Amount and/or Complexity of Data Reviewed  External Data Reviewed: labs and notes  Labs: ordered  Radiology: ordered and independent interpretation performed  ECG/medicine tests: ordered and independent interpretation performed  Risk  Prescription drug management  Decision regarding hospitalization            Disposition  Final diagnoses:   SOB (shortness of breath)   Hypoxia   Metastatic cancer ([facility])   UTI (urinary tract infection)     Time reflects when diagnosis was documented in both MDM as applicable and the Disposition within this note     Time User Action Codes Description Comment    5/25/2023 12:39 PM Leonila Morrell Add [R06 02] SOB (shortness of breath)     5/25/2023 12:39 PM Felix Hawk Add [R09 02] Hypoxia     5/25/2023 12:40 PM Felix Hawk Add [C79 9] Metastatic cancer ([address] )     5/25/2023  1:04 PM Felix Hawk Add [N39 0] UTI (urinary tract infection)       ED Disposition     ED Disposition   Admit    Condition   Stable    Date/Time   Thu May 25, 2023  1:06 PM    Comment   Case was discussed with Dr Brittnee Anderson and the patient's admission status was agreed to be Admission Status: inpatient status to the service of Dr Brittnee Anderson   Follow-up Information    None         Patient's Medications   Discharge Prescriptions    No medications on file       No discharge procedures on file      PDMP Review       Value Time User    PDMP Reviewed  Yes 4/16/2023 12:41 PM Rosangela Novak MD          ED Provider  Electronically Signed by           Diogenes Krishna PA-C  05/25/23 4744

## 2023-05-25 NOTE — PROGRESS NOTES
Assessment/Plan:    Problem List Items Addressed This Visit        Endocrine    Type 2 diabetes mellitus without complication ([facility] )       Lab Results   Component Value Date    HGBA1C 6 4 (H) 04/14/2023   On sliding scale insulin, metformin, Januvia            Genitourinary    Endometrial cancer ([facility] ) - Primary     79-year-old with progressive stage IVb grade 3 endometrial cancer with liver, lung, retroperitoneal lymph node metastases currently receiving palliative liposomal doxorubicin at 30 mg per metered squared  She has received 2 cycles of treatment  A dose reduction was necessary secondary to pancytopenia, TIA  She has treatment related fatigue  Current hemoglobin is 8 4 g/dL  Platelet count 576 K, serum creatinine 1 34 mg/dL  She has dyspnea even at rest   Her performance status is 3   1   Plan to stop palliative liposomal doxorubicin given quality of life concerns  2   Referral to ED for evaluation of worsening dyspnea  Likely plan for PE protocol CT with abdomen pelvis  She understands that disease progression may be causing the dyspnea  3   We discussed that she has had difficulty tolerating cytotoxic chemotherapy and has experienced pancytopenia which has led to treatment delays  We discussed the possibility of withholding further tumor directed therapy given quality of life concerns  We will discuss additional treatment planning after the CT imaging  4   Return in 1 month for evaluation-she will go on a treatment holiday for now           Relevant Orders    Transfer to other facility (Completed)       Other    Shortness of breath    Relevant Orders    Transfer to other facility (Completed)           CHIEF COMPLAINT: Prechemotherapy evaluation, fatigue, dyspnea          Problem:  Cancer Staging   Endometrial cancer [facility])  Staging form: Corpus Uteri - Carcinoma, AJCC 8th Edition  - Clinical: FIGO Stage IVB (cM1) - Signed by Luisa Ocampo MD on 2/17/2022      Previous therapy:  Oncology History   Endometrial cancer ([facility] )   1/2022 Initial Diagnosis    Endometrial cancer ([facility] )     1/4/2022 Biopsy    [facility]    A  Cervical polyp biopsy:   High grade endometrial carcinoma    B  Endometrium biopsy:  High grade endometrial carcinoma     2/17/2022 -  Cancer Staged    Staging form: Corpus Uteri - Carcinoma, AJCC 8th Edition  - Clinical: FIGO Stage IVB (cM1) - Signed by Caroline Jorgensen MD on 2/17/2022  Histologic grade (G): G3  Histologic grading system: 3 grade system       2/24/2022 -  Chemotherapy    Taxol 175 mg/m2 and carboplatin AUC 6 every 21 days  Taxol dose-reduced to 135 mg/m2 with cycle 2 due to arthralgias/myalgias  She has received 4 cycles in the neoadjuvant setting, and 2 following aborting debulking  Carboplatin dose-reduced to AUC 5 with cycle 5 d/t thrombocytopenia  3/11/2022 Genomic Testing    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, ER/MN positive, microsatellite stable  No other targeted therapy opportunities  5/31/2022 Surgery    Robotic-assisted BSO and lysis of adhesions  Hysterectomy aborted due to parametrial/cervical involvement and adhesive disease  9/15/2022 -  Chemotherapy    Keytruda 200 mg IV every 21 days with oral lenvatinib 10 mg daily (previously increased from 4 mg)  She is scheduled for cycle 8          - 2/24/2023 Radiation    Per patient, she completed 15 fractions to pelvis on 2/24/23 2/6/2023 - 2/24/2023 Radiation    Course: C1    Plan ID Energy Fractions Dose per Fraction (cGy) Dose Correction (cGy) Total Dose Delivered (cGy) Elapsed Days   Pelvis 10X 15 / 15 250 0 3,750 18         3/30/2023 -  Chemotherapy    Doxil 40 mg/m2 IV every 28 days  She received one cycle which was complicated by pancytopenia  Cycle 2, dose-reduce to 30 mg/m2 IV every 28 days  Patient ID: Yaima Delacruz is a 75 y o  female  Who returns for treatment discussion    She has received 2 cycles of palliative liposomal doxorubicin therapy  She was recently diagnosed with a TIA and is currently on Plavix  She has had severe treatment related fatigue, pancytopenia  Reviewed her labs from 5/22/2023  They are listed below  She has arrived in a wheelchair  Her quality of life is poor  She is unable to perform her activities of daily living  She has dyspnea  She felt that even while taking a shower, she was going to collapse from shortness of breath  Review of Systems   Constitutional: Positive for fatigue  Negative for activity change and unexpected weight change  HENT: Negative  Eyes: Negative  Respiratory: Positive for shortness of breath  Cardiovascular: Negative  Gastrointestinal: Negative for abdominal distention and abdominal pain  Endocrine: Negative  Genitourinary: Negative for pelvic pain and vaginal bleeding  Musculoskeletal: Positive for arthralgias and gait problem  Skin: Negative  Allergic/Immunologic: Negative  Neurological: Positive for weakness  Hematological: Negative  Psychiatric/Behavioral: Negative  Current Outpatient Medications   Medication Sig Dispense Refill   • acetaminophen (TYLENOL) 650 mg CR tablet Take 1,300 mg by mouth in the morning and 1,300 mg before bedtime       • al mag oxide-diphenhydramine-lidocaine viscous (MAGIC MOUTHWASH) 1:1:1 suspension Swish and spit 10 mL every 4 (four) hours as needed for mouth pain or discomfort 90 mL 0   • amLODIPine (NORVASC) 10 mg tablet      • BD Pen Needle Yoana 2nd Gen 32G X 4 MM MISC 2 (two) times a day     • Contour Next Test test strip 2 (two) times a day Test     • fexofenadine (ALLEGRA) 180 MG tablet every 24 hours     • furosemide (LASIX) 40 mg tablet Take 1 tablet by mouth daily     • Lenvima, 10 MG Daily Dose, 10 MG CPPK TAKE 1 CAPSULE BY MOUTH DAILY 30 each 2   • lidocaine-prilocaine (EMLA) cream Apply to port site 30 min prior to labs/chemo 30 g 2   • lisinopril (ZESTRIL) 40 mg tablet Take 1 tablet (40 mg total) by mouth daily 30 tablet 0   • LORazepam (ATIVAN) 0 5 mg tablet Take 0 5 mg by mouth in the morning and 0 5 mg in the evening  • metFORMIN (GLUCOPHAGE) 1000 MG tablet Take 1 tablet by mouth 2 (two) times a day with meals     • Microlet Lancets MISC 2 (two) times a day Test     • omeprazole (PriLOSEC) 20 mg delayed release capsule every 24 hours     • ondansetron (ZOFRAN) 8 mg tablet Take 1 tablet (8 mg total) by mouth every 8 (eight) hours as needed for nausea or vomiting 20 tablet 1   • sitaGLIPtin (JANUVIA) 100 mg tablet Take 1 tablet by mouth daily     • Toujeo SoloStar 300 units/mL CONCENTRATED U-300 injection pen (1-unit dial) every 12 (twelve) hours 34 units in the am and 36 units at night     • aspirin (ECOTRIN LOW STRENGTH) 81 mg EC tablet Take 1 tablet (81 mg total) by mouth daily Do not start before April 17, 2023  30 tablet 0   • atorvastatin (LIPITOR) 40 mg tablet Take 1 tablet (40 mg total) by mouth daily with dinner (Patient not taking: Reported on 5/25/2023) 30 tablet 0   • clopidogrel (PLAVIX) 75 mg tablet Take 1 tablet (75 mg total) by mouth daily for 21 days Do not start before April 17, 2023  21 tablet 0     No current facility-administered medications for this visit         Allergies   Allergen Reactions   • Empagliflozin Other (See Comments)     Yeast infection   • Exenatide Nausea Only   • Glipizide Other (See Comments)     hypoglycemic   • Repaglinide Nausea Only       Past Medical History:   Diagnosis Date   • Anxiety    • Arthritis    • Back pain    • Cancer ([facility])    • Depression    • Diabetes mellitus ([facility] )    • Disease of thyroid gland    • Endometrial cancer ([facility])    • GERD (gastroesophageal reflux disease)    • Hyperlipidemia associated with type 2 diabetes mellitus ([facility] )    • Hypertension    • Morbid obesity with BMI of 40 0-44 9, adult ([facility])    • Type 2 diabetes mellitus without complication [facility] )    • Urinary incontinence    • Wears glasses        Past Surgical History:   Procedure "Laterality Date   • ABDOMINAL ADHESION SURGERY N/A 2022    Procedure: LYSIS ADHESIONS LAPAROSCOPIC W ROBOT;  Surgeon: Nadine Aguila MD;  Location: [facility];  Service: Gynecology Oncology   • CATARACT EXTRACTION W/ INTRAOCULAR LENS IMPLANT Bilateral    • CHOLECYSTECTOMY OPEN     • CYSTOSCOPY N/A 2022    Procedure: CYSTOSCOPY;  Surgeon: Nadine Aguila MD;  Location: [facility];  Service: Gynecology Oncology   • IR PORT PLACEMENT  2022   • AL LAPS TOTAL HYSTERECT 250 GM/< W/RMVL TUBE/OVARY N/A 2022    Procedure: ROBOTIC ASSISTED TOTAL LAPAROSCOPIC HYSTERECTOMY, RIGHT SALPINGO-OOPHORECTOMY, LEFT SALPINGECTOMY,;  Surgeon: Nadine Aguila MD;  Location: [facility];  Service: Gynecology Oncology   • SALPINGOOPHORECTOMY N/A     only had one removed and not sure which   • TONSILLECTOMY         OB History        1    Para   1    Term                AB        Living           SAB        IAB        Ectopic        Multiple        Live Births                     Family History   Problem Relation Age of Onset   • Pancreatic cancer Father    • Colon cancer Maternal Aunt        The following portions of the patient's history were reviewed and updated as appropriate: allergies, current medications, past family history, past medical history, past social history, past surgical history and problem list       Objective:    Blood pressure 128/70, pulse (!) 111, height 5' 2 99\" (1 6 m), weight 84 8 kg (187 lb), SpO2 90 %  Body mass index is 33 14 kg/m²  Physical Exam  Vitals reviewed  Constitutional:       General: She is not in acute distress  Appearance: Normal appearance  She is ill-appearing  She is not toxic-appearing or diaphoretic  HENT:      Head: Normocephalic and atraumatic  Mouth/Throat:      Mouth: Mucous membranes are moist    Eyes:      General: No scleral icterus  Right eye: No discharge  Left eye: No discharge        " Conjunctiva/sclera: Conjunctivae normal    Cardiovascular:      Rate and Rhythm: Tachycardia present  Pulmonary:      Comments: Tachypnea  Musculoskeletal:      Right lower leg: No edema  Left lower leg: No edema  Skin:     General: Skin is warm and dry  Coloration: Skin is not jaundiced  Findings: No rash  Neurological:      General: No focal deficit present  Mental Status: She is alert and oriented to person, place, and time  Cranial Nerves: No cranial nerve deficit  Motor: Weakness present  Gait: Gait abnormal    Psychiatric:         Mood and Affect: Mood normal          Behavior: Behavior normal          Thought Content:  Thought content normal          Judgment: Judgment normal            Lab Results   Component Value Date     11 5 04/18/2023     Lab Results   Component Value Date    HCT 27 7 (L) 05/22/2023    HGB 8 4 (L) 05/22/2023    MCV 84 05/22/2023    PLT  05/22/2023      Comment:      Unable to perform due to clumped platelets    WBC 0 70 05/22/2023     Lab Results   Component Value Date    ALKPHOS 124 (H) 05/22/2023    ALT 15 05/22/2023    AST 38 05/22/2023    BUN 29 (H) 05/22/2023    CALCIUM 9 7 05/22/2023     05/22/2023    CO2 19 (L) 05/22/2023    CORRECTEDCA 11 0 (H) 05/22/2023    CREATININE 1 34 (H) 05/22/2023    EGFR 40 05/22/2023    GLUF 255 (H) 03/13/2023    K 4 8 05/22/2023        Trend:  Lab Results   Component Value Date     11 5 04/18/2023     3 3 03/21/2023     7 1 02/27/2023     4 1 01/16/2023     4 3 12/27/2022     3 3 12/05/2022     6 0 11/14/2022     4 8 10/24/2022     7 9 10/03/2022     7 3 09/12/2022     12 6 08/08/2022     14 3 07/11/2022     12 2 05/10/2022     13 6 04/25/2022     10 9 04/04/2022     11 4 03/15/2022     10 9 02/28/2022

## 2023-05-25 NOTE — ASSESSMENT & PLAN NOTE
UA with possible infection  Immunosuppressed due to chemotherapy  We will empirically treat with ceftriaxone x5 days

## 2023-05-25 NOTE — ASSESSMENT & PLAN NOTE
Lab Results   Component Value Date    HGBA1C 6 4 (H) 04/14/2023   On sliding scale insulin, metformin, Januvia

## 2023-05-26 ENCOUNTER — TELEPHONE (OUTPATIENT)
Dept: HEMATOLOGY ONCOLOGY | Facility: CLINIC | Age: 68
End: 2023-05-26

## 2023-05-26 LAB
ANION GAP SERPL CALCULATED.3IONS-SCNC: 10 MMOL/L (ref 4–13)
ANISOCYTOSIS BLD QL SMEAR: PRESENT
ANISOCYTOSIS BLD QL SMEAR: PRESENT
ATRIAL RATE: 102 BPM
BASOPHILS # BLD MANUAL: 0.05 THOUSAND/UL (ref 0–0.1)
BASOPHILS # BLD MANUAL: 0.06 THOUSAND/UL (ref 0–0.1)
BASOPHILS NFR MAR MANUAL: 1 % (ref 0–1)
BASOPHILS NFR MAR MANUAL: 1 % (ref 0–1)
BLASTS NFR BLD MANUAL: 1 %
BUN SERPL-MCNC: 28 MG/DL (ref 5–25)
CALCIUM SERPL-MCNC: 9.2 MG/DL (ref 8.4–10.2)
CHLORIDE SERPL-SCNC: 105 MMOL/L (ref 96–108)
CO2 SERPL-SCNC: 21 MMOL/L (ref 21–32)
CREAT SERPL-MCNC: 1.04 MG/DL (ref 0.6–1.3)
DACRYOCYTES BLD QL SMEAR: PRESENT
DACRYOCYTES BLD QL SMEAR: PRESENT
DIFFERENTIAL COMMENT: ABNORMAL
EOSINOPHIL # BLD MANUAL: 0 THOUSAND/UL (ref 0–0.4)
EOSINOPHIL # BLD MANUAL: 0.06 THOUSAND/UL (ref 0–0.4)
EOSINOPHIL NFR BLD MANUAL: 0 % (ref 0–6)
EOSINOPHIL NFR BLD MANUAL: 1 % (ref 0–6)
ERYTHROCYTE [DISTWIDTH] IN BLOOD BY AUTOMATED COUNT: 21.9 % (ref 11.6–15.1)
GFR SERPL CREATININE-BSD FRML MDRD: 55 ML/MIN/1.73SQ M
GLUCOSE SERPL-MCNC: 156 MG/DL (ref 65–140)
GLUCOSE SERPL-MCNC: 162 MG/DL (ref 65–140)
GLUCOSE SERPL-MCNC: 174 MG/DL (ref 65–140)
GLUCOSE SERPL-MCNC: 215 MG/DL (ref 65–140)
GLUCOSE SERPL-MCNC: 230 MG/DL (ref 65–140)
HCT VFR BLD AUTO: 22.6 % (ref 34.8–46.1)
HGB BLD-MCNC: 6.8 G/DL (ref 11.5–15.4)
HYPERCHROMIA BLD QL SMEAR: PRESENT
HYPERCHROMIA BLD QL SMEAR: PRESENT
LYMPHOCYTES # BLD AUTO: 0.65 THOUSAND/UL (ref 0.6–4.47)
LYMPHOCYTES # BLD AUTO: 1.49 THOUSAND/UL (ref 0.6–4.47)
LYMPHOCYTES # BLD AUTO: 12 % (ref 14–44)
LYMPHOCYTES # BLD AUTO: 25 % (ref 14–44)
MCH RBC QN AUTO: 25.1 PG (ref 26.8–34.3)
MCHC RBC AUTO-ENTMCNC: 30.1 G/DL (ref 31.4–37.4)
MCV RBC AUTO: 83 FL (ref 82–98)
METAMYELOCYTES NFR BLD MANUAL: 1 % (ref 0–1)
METAMYELOCYTES NFR BLD MANUAL: 4 % (ref 0–1)
MICROCYTES BLD QL AUTO: PRESENT
MICROCYTES BLD QL AUTO: PRESENT
MONOCYTES # BLD AUTO: 0.59 THOUSAND/UL (ref 0–1.22)
MONOCYTES # BLD AUTO: 0.89 THOUSAND/UL (ref 0–1.22)
MONOCYTES NFR BLD: 15 % (ref 4–12)
MONOCYTES NFR BLD: 25 % (ref 4–12)
MYELOCYTES NFR BLD MANUAL: 1 % (ref 0–1)
MYELOCYTES NFR BLD MANUAL: 2 % (ref 0–1)
NEUTROPHILS # BLD MANUAL: 3.15 THOUSAND/UL (ref 1.85–7.62)
NEUTROPHILS # BLD MANUAL: 3.78 THOUSAND/UL (ref 1.85–7.62)
NEUTS BAND NFR BLD MANUAL: 2 % (ref 0–8)
NEUTS BAND NFR BLD MANUAL: 2 % (ref 0–8)
NEUTS SEG NFR BLD AUTO: 50 % (ref 43–75)
NEUTS SEG NFR BLD AUTO: 51 % (ref 43–75)
NRBC BLD AUTO-RTO: 10 /100 WBC (ref 0–2)
NRBC BLD AUTO-RTO: 10 /100 WBC (ref 0–2)
OVALOCYTES BLD QL SMEAR: PRESENT
P AXIS: 23 DEGREES
PATHOLOGY REVIEW: YES
PLATELET # BLD AUTO: 109 THOUSANDS/UL (ref 149–390)
PLATELET BLD QL SMEAR: ABNORMAL
PLATELET BLD QL SMEAR: ABNORMAL
POLYCHROMASIA BLD QL SMEAR: PRESENT
POLYCHROMASIA BLD QL SMEAR: PRESENT
POTASSIUM SERPL-SCNC: 4.8 MMOL/L (ref 3.5–5.3)
PR INTERVAL: 166 MS
QRS AXIS: 74 DEGREES
QRSD INTERVAL: 106 MS
QT INTERVAL: 348 MS
QTC INTERVAL: 453 MS
RBC # BLD AUTO: 2.71 MILLION/UL (ref 3.81–5.12)
RBC MORPH BLD: PRESENT
RBC MORPH BLD: PRESENT
SCHISTOCYTES BLD QL SMEAR: PRESENT
SODIUM SERPL-SCNC: 136 MMOL/L (ref 135–147)
T WAVE AXIS: 23 DEGREES
VARIANT LYMPHS # BLD AUTO: 3 %
VARIANT LYMPHS # BLD AUTO: 3 %
VENTRICULAR RATE: 102 BPM
WBC # BLD AUTO: 5.95 THOUSAND/UL (ref 4.31–10.16)

## 2023-05-26 RX ORDER — LORAZEPAM 0.5 MG/1
0.5 TABLET ORAL 2 TIMES DAILY
Status: DISCONTINUED | OUTPATIENT
Start: 2023-05-27 | End: 2023-05-26

## 2023-05-26 RX ORDER — LORAZEPAM 0.5 MG/1
0.5 TABLET ORAL 2 TIMES DAILY
Status: DISCONTINUED | OUTPATIENT
Start: 2023-05-26 | End: 2023-05-27 | Stop reason: HOSPADM

## 2023-05-26 RX ORDER — LORAZEPAM 1 MG/1
1 TABLET ORAL EVERY 6 HOURS PRN
Qty: 20 TABLET | Refills: 0 | Status: SHIPPED | OUTPATIENT
Start: 2023-05-26

## 2023-05-26 RX ORDER — OXYCODONE HYDROCHLORIDE 5 MG/1
TABLET ORAL
Qty: 20 TABLET | Refills: 0 | Status: SHIPPED | OUTPATIENT
Start: 2023-05-26 | End: 2023-05-28

## 2023-05-26 RX ORDER — SENNOSIDES 8.6 MG
8.6 TABLET ORAL
Qty: 30 TABLET | Refills: 0 | Status: SHIPPED | OUTPATIENT
Start: 2023-05-26

## 2023-05-26 RX ORDER — HALOPERIDOL 1 MG/1
1 TABLET ORAL EVERY 6 HOURS PRN
Qty: 10 TABLET | Refills: 0 | Status: SHIPPED | OUTPATIENT
Start: 2023-05-26

## 2023-05-26 RX ADMIN — BUDESONIDE 0.5 MG: 0.5 INHALANT ORAL at 19:47

## 2023-05-26 RX ADMIN — IPRATROPIUM BROMIDE 0.5 MG: 0.5 SOLUTION RESPIRATORY (INHALATION) at 08:52

## 2023-05-26 RX ADMIN — LORATADINE 10 MG: 10 TABLET ORAL at 08:18

## 2023-05-26 RX ADMIN — LEVALBUTEROL HYDROCHLORIDE 0.63 MG: 0.63 SOLUTION RESPIRATORY (INHALATION) at 08:52

## 2023-05-26 RX ADMIN — CEFTRIAXONE 1000 MG: 1 INJECTION, SOLUTION INTRAVENOUS at 09:53

## 2023-05-26 RX ADMIN — LEVALBUTEROL HYDROCHLORIDE 0.63 MG: 0.63 SOLUTION RESPIRATORY (INHALATION) at 19:47

## 2023-05-26 RX ADMIN — LEVALBUTEROL HYDROCHLORIDE 0.63 MG: 0.63 SOLUTION RESPIRATORY (INHALATION) at 13:48

## 2023-05-26 RX ADMIN — PANTOPRAZOLE SODIUM 40 MG: 40 TABLET, DELAYED RELEASE ORAL at 06:13

## 2023-05-26 RX ADMIN — INSULIN LISPRO 2 UNITS: 100 INJECTION, SOLUTION INTRAVENOUS; SUBCUTANEOUS at 22:52

## 2023-05-26 RX ADMIN — ASPIRIN 81 MG: 81 TABLET, COATED ORAL at 08:17

## 2023-05-26 RX ADMIN — INSULIN LISPRO 1 UNITS: 100 INJECTION, SOLUTION INTRAVENOUS; SUBCUTANEOUS at 17:58

## 2023-05-26 RX ADMIN — BUDESONIDE 0.5 MG: 0.5 INHALANT ORAL at 08:52

## 2023-05-26 RX ADMIN — INSULIN LISPRO 3 UNITS: 100 INJECTION, SOLUTION INTRAVENOUS; SUBCUTANEOUS at 12:45

## 2023-05-26 RX ADMIN — AMLODIPINE BESYLATE 10 MG: 5 TABLET ORAL at 08:16

## 2023-05-26 RX ADMIN — IPRATROPIUM BROMIDE 0.5 MG: 0.5 SOLUTION RESPIRATORY (INHALATION) at 13:48

## 2023-05-26 RX ADMIN — ROSUVASTATIN CALCIUM 20 MG: 20 TABLET, FILM COATED ORAL at 17:58

## 2023-05-26 RX ADMIN — LORAZEPAM 0.5 MG: 0.5 TABLET ORAL at 22:51

## 2023-05-26 RX ADMIN — INSULIN LISPRO 1 UNITS: 100 INJECTION, SOLUTION INTRAVENOUS; SUBCUTANEOUS at 08:16

## 2023-05-26 RX ADMIN — FUROSEMIDE 40 MG: 40 TABLET ORAL at 08:18

## 2023-05-26 RX ADMIN — LISINOPRIL 40 MG: 20 TABLET ORAL at 08:18

## 2023-05-26 RX ADMIN — IPRATROPIUM BROMIDE 0.5 MG: 0.5 SOLUTION RESPIRATORY (INHALATION) at 19:47

## 2023-05-26 NOTE — TELEPHONE ENCOUNTER
Patient Call    Who are you speaking with? Spouse    If it is not the patient, are they listed on an active communication consent form? Yes   What is the reason for this call? Robert calling in stating his wife told him that Dr Stephon England wanted to speak with him  Red Dickey was unsure as to what the doctor wanted to discuss  Does this require a call back? Yes   If a call back is required, please list [facility] call back number [phone]   If a call back is required, advise that a message will be forwarded to their care team and someone will return their call as soon as possible  Did you relay this information to the patient?  Yes

## 2023-05-26 NOTE — PLAN OF CARE
Problem: Potential for Falls  Goal: Patient will remain free of falls  Description: INTERVENTIONS:  - Educate patient/family on patient safety including physical limitations  - Instruct patient to call for assistance with activity   - Consult OT/PT to assist with strengthening/mobility   - Keep Call bell within reach  - Keep bed low and locked with side rails adjusted as appropriate  - Keep care items and personal belongings within reach  - Initiate and maintain comfort rounds  - Make Fall Risk Sign visible to staff  - Offer Toileting every 2 Hours, in advance of need  - Initiate/Maintain call bell alarm  - Obtain necessary fall risk management equipment: call bell usage  - Apply yellow socks and bracelet for high fall risk patients  - Consider moving patient to room near nurses station  Outcome: Progressing

## 2023-05-26 NOTE — CASE MANAGEMENT
Case Management Assessment & Discharge Planning Note    Patient name Ariel Zaragoza  Location /-82 MRN [id number]  : 1955 Date 2023       Current Admission Date: 2023  Current Admission Diagnosis:Respiratory failure with hypoxia [facility])   Patient Active Problem List    Diagnosis Date Noted   • [facility] 2023   • UTI (urinary tract infection) 2023   • CVA (cerebral vascular accident) ([facility] ) 2023   • HTN (hypertension) 2022   • Hypertensive urgency 2022   • Headache 2022   • Chemotherapy-induced thrombocytopenia 2022   • Antineoplastic chemotherapy induced pancytopenia (CODE) ([place] [address] ) 2022   • Postoperative state 2022   • Endometrial cancer ([facility] ) 2022   • Hyperlipidemia associated with type 2 diabetes mellitus ([facility] ) 2022   • Type 2 diabetes mellitus without complication ([place] [address] )    • Morbid obesity with BMI of 40 0-44 9, adult ([place] [address] ) 2022      LOS (days): 1  Geometric Mean LOS (GMLOS) (days): 3 50  Days to GMLOS:2 6     OBJECTIVE:    Risk of Unplanned Readmission Score: 20 29         Current admission status: Inpatient       Preferred Pharmacy:   [address]  BRADLYjanessa ABDULLAHI [address]  Phone: [phone] Fax: [phone]  [address] [place] [id number] [address]  Phone: [phone] Fax: [phone]    Primary Care Provider: Tati Everett DO    Primary Insurance: MEDICARE  Secondary Insurance: BLUE CROSS    ASSESSMENT:  [address],  [address] Representative - Spouse   Primary Phone: [phone] (Mobile)  Home Phone: [phone]               Advance Directives  Does patient have a [address] [place] [address] Avenue?: No  Was patient offered paperwork?: Yes (declined)  Does patient currently have a Health Care decision maker?: Yes, please see Health Care Proxy section  Does patient have Advance Directives?: No  Was patient offered paperwork?: Yes (declined)  Primary Contact: Spouse, Robert Gusman         Readmission Root Cause  30 Day Readmission: No    Patient Information  Admitted from[de-identified] Home  Mental Status: Alert  During Assessment patient was accompanied by: Not accompanied during assessment  Assessment information provided by[de-identified] Patient  Primary Caregiver: Self  Support Systems: Self, Spouse/significant other, Son  [facility] [address] [place] [address] do you live in?: [place] [address] entry access options   Select all that apply : Stairs  Number of steps to enter home : 3  Do the steps have railings?: Yes  Type of Current Residence: Travel Trailer/ Mobile Home  In the last 12 months, was there a time when you were not able to pay the mortgage or rent on time?: No  In the last 12 months, how many places have you lived?: 1  In the last 12 months, was there a time when you did not have a steady place to sleep or slept in a shelter (including now)?: No  Homeless/housing insecurity resource given?: N/A  Living Arrangements: Lives w/ Spouse/significant other    Activities of Daily Living Prior to Admission  Functional Status: Independent  Completes ADLs independently?: Yes  Ambulates independently?: Yes  Does patient use assisted devices?: No  Does patient currently own DME?: No  Does patient have a history of Outpatient Therapy (PT/OT)?: No  Does the patient have a history of Short-Term Rehab?: No  Does patient have a history of HHC?: No  Does patient currently have [facility]?: No         Patient Information Continued  Income Source: Pension/residential  Does patient have prescription coverage?: Yes  Within the past 12 months, you worried that your food would run out before you got the money to buy more : Never true  Within the past 12 months, the food you bought just didn't last and you didn't have money to get more : Never true  Food insecurity resource given?: N/A  Does patient receive dialysis treatments?: No  Does patient have a history of substance abuse?: No  Does patient have a history of Mental Health Diagnosis?: No         Means of Transportation  Means of Transport to Appts[de-identified] Family transport  In the past 12 months, has lack of transportation kept you from medical appointments or from getting medications?: No  In the past 12 months, has lack of transportation kept you from meetings, work, or from getting things needed for daily living?: No  Was application for public transport provided?: N/A        DISCHARGE DETAILS:    Discharge planning discussed with[de-identified] Patient  Freedom of Choice: Yes  Comments - Freedom of Choice: Patient prefers [facility]  CM contacted family/caregiver?: No- see comments (Pt declined call to hospice)  Were Treatment Team discharge recommendations reviewed with patient/caregiver?: Yes  Did patient/caregiver verbalize understanding of patient care needs?: Yes  Were patient/caregiver advised of the risks associated with not following Treatment Team discharge recommendations?: Yes         [address]         Is the patient interested in [facility] at discharge?: No    DME Referral Provided  Referral made for DME?: No    Other Referral/Resources/Interventions Provided:  Interventions: Hospice  Programs[de-identified] Palliative Care         Treatment Team Recommendation: Hospice  Discharge Destination Plan[de-identified] Hospice  Transport at Discharge : Family                                      Additional Comments: Met with pt to discuss role of CM and any needs prior to discharge  Pt lives w/ spouse in mobile home  No DME  Indp PTA  Pt will need home O2/nebulizer at home with hospice  Referral made in [facility] does not cover her area  [facility] agree[facility]  No hx STR/HH/OP PT/DA/MH  Spouse to transport home

## 2023-05-26 NOTE — UTILIZATION REVIEW
Initial Clinical Review    Admission: Date/Time/Statement:   Admission Orders (From admission, onward)     Ordered        05/25/23 1306  INPATIENT ADMISSION  Once                      Orders Placed This Encounter   Procedures   • INPATIENT ADMISSION     Standing Status:   Standing     Number of Occurrences:   1     Order Specific Question:   Level of Care     Answer:   Med Surg [16]     Order Specific Question:   Estimated length of stay     Answer:   More than 2 Midnights     Order Specific Question:   Certification     Answer:   I certify that inpatient services are medically necessary for this patient for a duration of greater than two midnights  See H&P and MD Progress Notes for additional information about the patient's course of treatment  ED Arrival Information     Expected   5/25/2023     Arrival   5/25/2023 09:05    Acuity   Emergent            Means of arrival   Wheelchair    Escorted by   [facility]    Service   Hospitalist    Admission type   Emergency            Arrival complaint   Trouble Breathing, Weakness,Endometrial cancer [facility])           Chief Complaint   Patient presents with   • Shortness of Breath     Patient presents to the ER with SOB  Initial Presentation: 75 y o  female  From home to ED, per oncology,  admitted inpatient due to respiratory failure with hypoxia/UTI/Metastatic endometrial cancer  Presented due to worsening shortness of breath starting month prior to arrival  + fatigue  + scratchy throat and congestion  On exam: tachycardia  Lungs rales  Left leg slightly large than right   Bilateral trace edema  Pale  Hypoxic on room air  H&h 8 9/29 3  D dimer 2 19  Procalcitonin 1 07   Bun 35  Creatinine 1 15    Cta chest showed progressive metastatic disease of lungs  Loculated pleural effusion  Worsening lymphadenopathy  In the ED placed on 4 liters  Given 1 liter IVF bolus, started on ceftriaxone  Plan is continue oxygen as needed  Consult gyn oncology  Trial nebs  Claritin and Flonase for allergic symptoms  Follow cultures and continue ceftriaxone  Hold home Januvia and metformin  Start SSI      5/25/23 per gyn oncology - patient with significant dyspnea at rest when seen in office and sent to ED  This is due to extensive pulmonary metastatic disease  Recommend to stop all tumor directed therapy and consider hospice  Date: 5/26/23    Day 2:  Dyspnea better with rest and nebs  On exam remains on oxygen  H&h 6 8/22  6  Continue oxygen and nebs  Gyn onc to speak with family,  Hospice consulted  5/26/23 per palliative care -  Patient with metastatic endometrial cancer  Discussed goals of care  Patient considering transition to hospice and has not told spouse or son  Requests gyn onc to speak with spouse  Plan is symptom management  Discussed low dose opioid for air hunger after exertion  Started on low dose oxycodone       ED Triage Vitals   Temperature Pulse Respirations Blood Pressure SpO2   05/25/23 0922 05/25/23 0922 05/25/23 0922 05/25/23 0922 05/25/23 0922   98 3 °F (36 8 °C) (!) 109 20 133/57 (!) 87 %      Temp Source Heart Rate Source Patient Position - Orthostatic VS BP Location FiO2 (%)   05/25/23 0922 05/25/23 0922 05/25/23 1100 05/25/23 1100 --   Oral Monitor Sitting Left arm       Pain Score       05/25/23 1928       No Pain          Wt Readings from Last 1 Encounters:   05/25/23 84 8 kg (187 lb)     Additional Vital Signs:   05/26/23 07:56:40 98 1 °F (36 7 °C) 102 -- 116/47 Abnormal  70 94 % -- -- -- --   05/26/23 07:35:09 97 9 °F (36 6 °C) 96 23 Abnormal  116/47 Abnormal  70 94 % -- -- -- --   05/25/23 2145 -- -- -- -- -- -- 36 4 L/min Nasal cannula --   05/25/23 21:00:01 98 1 °F (36 7 °C) 96 20 111/45 Abnormal  67 94 % -- -- -- --   05/25/23 16:37:57 98 1 °F (36 7 °C) 97 23 Abnormal  118/48 Abnormal  71 91 % -- -- -- --   05/25/23 1400 -- 103 24 Abnormal  129/57 82 93 % 32 3 L/min Nasal cannula --   05/25/23 1300 -- 98 24 Abnormal  130/58 83 91 % 36 4 L/min Nasal cannula --   05/25/23 1200 -- 97 22 124/60 87 93 % -- -- -- --   05/25/23 1100 -- 96 22 110/53 76 93 % 28 2 L/min Nasal cannula      Pertinent Labs/Diagnostic Test Results:   CTA ED chest PE study   Final Result by Homero Celeste MD (05/25 1204)      No definite CT evidence of pulmonary embolism  Innumerable large nodules/masses throughout both lung fields, significantly increased in size and number when compared with the prior CT study compatible with significantly progressed metastatic disease  Trace loculated pleural effusions posteriorly  Worsening mediastinal and left supraclavicular lymphadenopathy concerning for progressive metastatic disease  Hepatic metastatic disease partially visualized  Workstation performed: [id number]         XR chest 2 views   ED Interpretation by Dionne Verma PA-C (05/25 1113)   Multiple round opacities b/l lungs, most likely related to mets  Final Result by Sander Dimas MD (05/25 1325)      Worsening diffuse bilateral pulmonary metastases              Workstation performed: [id number]             5/25/23 ecg Previous ECG:  Compared to current     Similarity:  No change   Rate:     ECG rate:  102     ECG rate assessment: tachycardic     Rhythm:     Rhythm: sinus tachycardia     Conduction:     Conduction: abnormal       Abnormal conduction: complete RBBB     Results from last 7 days   Lab Units 05/25/23  1035   SARS-COV-2  Negative     Results from last 7 days   Lab Units 05/26/23  0621 05/25/23  1018 05/22/23  1134   BANDS PCT % 2 2 6   HEMATOCRIT % 22 6* 29 3* 27 7*   HEMOGLOBIN g/dL 6 8* 8 9* 8 4*   PLATELETS Thousands/uL 109* 109*  --    WBC Thousand/uL 5 95 5 40 4 49     Results from last 7 days   Lab Units 05/26/23  0621 05/25/23  1018 05/22/23  1134   ANION GAP mmol/L 10 12 8   BUN mg/dL 28* 35* 29*   CALCIUM mg/dL 9 2 9 4 9 7   CHLORIDE mmol/L 105 103 106   CO2 mmol/L 21 19* 19*   CREATININE mg/dL 1 04 1 15 1 34*   EGFR ml/min/1 73sq m 55 49 40   POTASSIUM mmol/L 4 8 4 9 4 8   MAGNESIUM mg/dL  --   --  2 4   SODIUM mmol/L 136 134* 133*     Results from last 7 days   Lab Units 05/25/23  1018 05/22/23  1134   ALBUMIN g/dL 3 3* 2 4*   ALK PHOS U/L 109* 124*   ALT U/L 8 15   AST U/L 16 38   TOTAL BILIRUBIN mg/dL 0 70 0 71   TOTAL PROTEIN g/dL 6 1* 6 5     Results from last 7 days   Lab Units 05/26/23  0717 05/25/23  2058 05/25/23  1645   POC GLUCOSE mg/dl 162* 136 155*     Results from last 7 days   Lab Units 05/26/23  0621 05/25/23  1018 05/22/23  1134   GLUCOSE RANDOM mg/dL 156* 168* 241*     Results from last 7 days   Lab Units 05/25/23  1227 05/25/23  1018   HS TNI 0HR ng/L  --  5   HS TNI 2HR ng/L 5  --    HSTNI D2 ng/L 0  --      Results from last 7 days   Lab Units 05/25/23  1018   D-DIMER QUANTITATIVE ug/ml FEU 2 19*     Results from last 7 days   Lab Units 05/25/23  1018   INR  1 14   PROTIME seconds 15 3*   PTT seconds 28     Results from last 7 days   Lab Units 05/25/23  1018   PROCALCITONIN ng/ml 1 07*     Results from last 7 days   Lab Units 05/25/23  1018   LACTIC ACID mmol/L 1 5     Results from last 7 days   Lab Units 05/25/23  1018   BNP pg/mL 36     Results from last 7 days   Lab Units 05/25/23  1227   BACTERIA UA /hpf Occasional   BILIRUBIN UA  Negative   BLOOD UA  Moderate*   CLARITY UA  Slightly Cloudy   COLOR UA  Light Yellow   EPITHELIAL CELLS WET PREP /hpf Occasional   GLUCOSE UA mg/dl Negative   KETONES UA mg/dl Negative   LEUKOCYTES UA  Large*   NITRITE UA  Negative   PH UA  6 0   PROTEIN UA mg/dl 30 (1+)*   RBC UA /hpf 0-1   SPEC GRAV UA  <1 005*   UROBILINOGEN UA (BE) mg/dl <2 0   WBC UA /hpf 20-30*     Results from last 7 days   Lab Units 05/25/23  1035   INFLUENZA A PCR  Negative   INFLUENZA B PCR  Negative   RSV PCR  Negative     Results from last 7 days   Lab Units 05/25/23  1018   BLOOD CULTURE  Received in Microbiology Lab  Culture in Progress    Received in Microbiology Lab  Culture in Progress         ED Treatment:   Medication Administration from 05/25/2023 0901 to 05/25/2023 1453       Date/Time Order Dose Route Action Comments     05/25/2023 1024 EDT sodium chloride 0 9 % bolus 1,000 mL 1,000 mL Intravenous New Bag --     05/25/2023 1333 EDT cefTRIAXone (ROCEPHIN) IVPB (premix in dextrose) 1,000 mg 50 mL 1,000 mg Intravenous New Bag --        Past Medical History:   Diagnosis Date   • Anxiety    • Arthritis    • Back pain    • Cancer (HCC)    • Depression    • Diabetes mellitus (HCC)    • Disease of thyroid gland    • Endometrial cancer (HCC)    • GERD (gastroesophageal reflux disease)    • Hyperlipidemia associated with type 2 diabetes mellitus (HCC)    • Hypertension    • Morbid obesity with BMI of 40 0-44 9, adult (HCC)    • Type 2 diabetes mellitus without complication (HCC)    • Urinary incontinence    • Wears glasses      Present on Admission:  • CVA (cerebral vascular accident) ([facility] )  • Endometrial cancer ([facility] )  • Type 2 diabetes mellitus without complication ([facility])  • HTN (hypertension)  • Respiratory failure with hypoxia (HCC)      Admitting Diagnosis: Shortness of breath [R06 02]  UTI (urinary tract infection) [N39 0]  Metastatic cancer (HCC) [C79 9]  SOB (shortness of breath) [R06 02]  Hypoxia [R09 02]  Age/Sex: 75 y o  female  Admission Orders:  Scheduled Medications:  amLODIPine, 10 mg, Oral, Daily  aspirin, 81 mg, Oral, Daily  budesonide, 0 5 mg, Nebulization, Q12H  cefTRIAXone, 1,000 mg, Intravenous, Q24H  enoxaparin, 40 mg, Subcutaneous, Daily  fluticasone, 1 spray, Each Nare, Daily  furosemide, 40 mg, Oral, Daily  insulin lispro, 1-6 Units, Subcutaneous, 4x Daily (AC & HS)  ipratropium, 0 5 mg, Nebulization, TID  levalbuterol, 0 63 mg, Nebulization, TID  lisinopril, 40 mg, Oral, Daily  loratadine, 10 mg, Oral, Daily  LORazepam, 0 5 mg, Oral, BID  pantoprazole, 40 mg, Oral, Early Morning  rosuvastatin, 20 mg, Oral, Daily With Dinner    Continuous IV Infusions: none      PRN Meds: not used   acetaminophen, 650 mg, Oral, Q6H PRN  aluminum-magnesium hydroxide-simethicone, 30 mL, Oral, Q6H PRN  ondansetron, 4 mg, Intravenous, Q6H PRN    Oxygen 2 liters  IP CONSULT TO GYNECOLOGIC ONCOLOGY  IP CONSULT TO HOSPICE  IP CONSULT TO PALLIATIVE CARE    Network Utilization Review Department  ATTENTION: Please call with any questions or concerns to [phone] and carefully listen to the prompts so that you are directed to the right person  All voicemails are confidential   Juliette Mathew all requests for admission clinical reviews, approved or denied determinations and any other requests to dedicated fax number below belonging to the campus where the patient is receiving treatment   List of dedicated fax numbers for the Facilities:  [address] DENIALS (Administrative/Medical Necessity) [phone]   [address] (Maternity/NICU/Pediatrics) [phone]   [address] [place] [phone]   [facility] [phone]   [address] [place] [address] [address] [place] [address] [facility] 28 [phone]   [facility] [facility] 815 [facility] [phone]

## 2023-05-26 NOTE — ASSESSMENT & PLAN NOTE
Follows with Dr Indy Minor  Has been receiving chemotherapy, however not tolerating it very well  CT imaging today shows progression of metastatic disease  Consult gyn onc - saw pt, recommending hospice

## 2023-05-26 NOTE — ASSESSMENT & PLAN NOTE
Patient presents with progressively worsening shortness of breath over the last couple of months  Now only able to ambulate couple of feet prior to requiring rest   · Hypoxic in the 80s today, currently requiring 4 L via nasal cannula  · Lungs clear to auscultation, no rales/wheezing  · CTA chest- no PE  Innumerable large nodules/masses throughout both lung fields, significantly increased in size and number when compared with the prior CT study compatible with significantly progressed metastatic disease  · Suspect secondary to progression of metastatic disease  Allergies may also be contributing as patient reports scratchy throat and nasal congestion  · Will trial nebulizers for symptomatic relief  · Claritin and Flonase for allergic symptoms  · Dr Anitra Jeans spoke with pt and family regarding progression of metastatic disease  Pt understanding and transitioning to home hospice  · Hospice + palliative consulted

## 2023-05-26 NOTE — PROGRESS NOTES
RN giving report to oncoming nurse Jr Delatorre Long-notified by tiger text and in middle of report passed on information and acknowledge     Damon Gao also acknowledged lab result

## 2023-05-26 NOTE — SPEECH THERAPY NOTE
Speech Language/Pathology    Speech-Language Pathology Bedside Swallow Evaluation      Patient Name: Philipp Khalil    Today's Date: 5/26/2023     Problem List  Principal Problem:    Respiratory failure with hypoxia ([facility] )  Active Problems:    [facility] )    Type 2 diabetes mellitus without complication ([facility] )    HTN (hypertension)    CVA (cerebral vascular accident) ([facility] )    UTI (urinary tract infection)      Past Medical History  Past Medical History:   Diagnosis Date   • Anxiety    • Arthritis    • Back pain    • Cancer ([facility] )    • Depression    • Diabetes mellitus ([facility] )    • Disease of thyroid gland    • Endometrial cancer ([facility] )    • GERD (gastroesophageal reflux disease)    • Hyperlipidemia associated with type 2 diabetes mellitus ([facility]  )    • Hypertension    • Morbid obesity with BMI of 40 0-44 9, adult ([place] [address] )    • Type 2 diabetes mellitus without complication ([facility] )    • Urinary incontinence    • Wears glasses        Past Surgical History  Past Surgical History:   Procedure Laterality Date   • ABDOMINAL ADHESION SURGERY N/A 5/31/2022    Procedure: Shanita Ramirez;  Surgeon: Miranda Persaud MD;  Location: [facility] MAIN OR;  Service: Gynecology Oncology   • CATARACT EXTRACTION W/ INTRAOCULAR LENS IMPLANT Bilateral    • CHOLECYSTECTOMY OPEN     • CYSTOSCOPY N/A 5/31/2022    Procedure: Omkar Fletcher;  Surgeon: Miranda Persaud MD;  Location: [facility];  Service: Gynecology Oncology   • IR PORT PLACEMENT  03/07/2022   • WV LAPS TOTAL HYSTERECT 250 GM/< W/RMVL TUBE/OVARY N/A 5/31/2022    Procedure: ROBOTIC ASSISTED TOTAL LAPAROSCOPIC HYSTERECTOMY, RIGHT SALPINGO-OOPHORECTOMY, LEFT SALPINGECTOMY,;  Surgeon: Miranda Persaud MD;  Location: [facility];  Service: Gynecology Oncology   • SALPINGOOPHORECTOMY N/A     only had one removed and not sure which   • TONSILLECTOMY         Summary   Pt presented with functional appearing oral and pharyngeal stage swallowing skills with materials administered today  Mastication and oral organization is timely and effective  Prompt transfers without significant oral residue  Swallows suspected timely  No overt s/s aspiration across trials  Education provided regarding strategies to optimize swallow safety and s/s aspiration to notify medical team of should they arise  Pt demonstrated understanding and denied questions/concerns at this time  Risk/s for Aspiration: Mild      Recommended Diet: regular diet and thin liquids   Recommended Form of Meds: whole with liquid   Aspiration precautions and swallowing strategies: upright posture, only feed when fully alert, slow rate of feeding and small bites/sips  Other Recommendations: Continue frequent oral care        Current Medical Status  Pt is a 75 y o  female who presented to [address] [place] [address] with a PMH of metastatic endometrial cancer, HLD, HTN, T2DM who presents with progressively worsening shortness of breath  Patient reports that she has been feeling short of breath for several months, however has recently progressed significantly to the point where it is impacting her day-to-day activities  She is currently unable to ambulate more than several feet prior to requesting rest   She was seen at her oncologist office who noted that patient was hypoxic and sent to the emergency department  On arrival to the ED, she is hemodynamically stable, however is requiring 4 L via nasal cannula to maintain sats greater than 90%  Underwent CTA chest which showed no pulmonary embolism, however did show significant progression of metastatic disease in the lungs  She will be admitted for further work-up  Current Precautions:       Allergies:  No known food allergies    Past medical history:  Please see H&P for details    Special Studies:  CTA chest PE study 5/25: No definite CT evidence of pulmonary embolism      Innumerable large nodules/masses throughout both lung fields, significantly increased in size and number when compared with the prior CT study compatible with significantly progressed metastatic disease      Trace loculated pleural effusions posteriorly      Worsening mediastinal and left supraclavicular lymphadenopathy concerning for progressive metastatic disease  Hepatic metastatic disease partially visualized  CXR 5/25: Worsening diffuse bilateral pulmonary metastases          Social/Education/Vocational Hx:  Pt lives w/ spouse    Swallow Information   Current Risks for Dysphagia & Aspiration: respiratory status  Current Symptoms/Concerns: change in respiratory status  Current Diet: regular diet and thin liquids   Baseline Diet: regular diet and thin liquids      Baseline Assessment   Behavior/Cognition: alert  Speech/Language Status: able to participate in conversation and able to follow commands  Patient Positioning: upright in chair  Pain Status/Interventions/Response to Interventions:  No report of or nonverbal indications of pain  Swallow Mechanism Exam  Facial: symmetrical  Labial: WFL  Lingual: WFL  Velum: symmetrical  Mandible: adequate ROM  Dentition: adequate  Vocal quality:clear/adequate   Volitional Cough: weak   Respiratory Status:  on 3L O2       Consistencies Assessed and Performance   Consistencies Administered: thin liquids, puree, soft solids and hard solids    Oral Stage: WFL  Mastication was adequate with the materials administered today  Bolus formation and transfer were functional with no significant oral residue noted  No overt s/s reduced oral control  Pharyngeal Stage: WFL  Swallow Mechanics:  Swallowing initiation appeared prompt  Laryngeal rise was palpated and judged to be within functional limits  No coughing, throat clearing, change in vocal quality or respiratory status noted today       Esophageal Concerns: none reported    Strategies and Efficacy: -    Summary and Recommendations (see above)    Results Reviewed with: patient and RN     Treatment Recommended: Not at this time, evaluation only  Please re-consult if medically necessary

## 2023-05-26 NOTE — PLAN OF CARE
Problem: Potential for Falls  Goal: Patient will remain free of falls  Description: INTERVENTIONS:  - Educate patient/family on patient safety including physical limitations  - Instruct patient to call for assistance with activity   - Consult OT/PT to assist with strengthening/mobility   - Keep Call bell within reach  - Keep bed low and locked with side rails adjusted as appropriate  - Keep care items and personal belongings within reach  - Initiate and maintain comfort rounds  - Make Fall Risk Sign visible to staff  - Offer Toileting every 2 Hours, in advance of need  - Initiate/Maintain bed alarm  - Obtain necessary fall risk management equipment: bed alaram  - Apply yellow socks and bracelet for high fall risk patients  - Consider moving patient to room near nurses station  Outcome: Progressing

## 2023-05-26 NOTE — ASSESSMENT & PLAN NOTE
Lab Results   Component Value Date    HGBA1C 6 4 (H) 04/14/2023       Recent Labs     05/25/23  1645 05/25/23 2058 05/26/23  0717 05/26/23  1055   POCGLU 155* 136 162* 230*       Blood Sugar Average: Last 72 hrs:  (P) 170  75Home regimen: Januvia and metformin  Hold oral antihyperglycemic's while inpatient  Start sliding scale insulin  Diabetic diet

## 2023-05-26 NOTE — CASE MANAGEMENT
Case Management Discharge Planning Note    Patient name Danny Dacosta  Location /-01 MRN [id number]  : 1955 Date 2023       Current Admission Date: 2023  Current Admission Diagnosis:Respiratory failure with hypoxia [facility])   Patient Active Problem List    Diagnosis Date Noted   • [facility] failure with hypoxia [facility] ) 2023   • UTI (urinary tract infection) 2023   • CVA (cerebral vascular accident) ([facility] ) 2023   • HTN (hypertension) 2022   • Hypertensive urgency 2022   • Headache 2022   • Chemotherapy-induced thrombocytopenia 2022   • Antineoplastic chemotherapy induced pancytopenia (CODE) ([facility] ) 2022   • Postoperative state 2022   • Endometrial cancer ([facility] ) 2022   • Hyperlipidemia associated with type 2 diabetes mellitus ([facility] ) 2022   • Type 2 diabetes mellitus without complication ([facility] )    • Morbid obesity with BMI of 40 0-44 9, adult ([facility] ) 2022      LOS (days): 1  Geometric Mean LOS (GMLOS) (days): 3 50  Days to GMLOS:2 5     OBJECTIVE:  Risk of Unplanned Readmission Score: 20 33         Current admission status: Inpatient   Preferred Pharmacy:   [address]  [place] [address]  Phone: [phone] Fax: [phone] [facility] [place], [address]  [address]  [address] [address] [address]  Phone: [phone] Fax: [phone]    Primary Care Provider: Jasbir Collazo DO    Primary Insurance: MEDICARE  Secondary Insurance: [facility] accepted patient and is reserved in [place]   Information updated in follow up section of AVS

## 2023-05-26 NOTE — PROGRESS NOTES
[place]  Progress Note  Name: Demetria Ramirez  MRN: [id number]  Unit/Bed#: -01 I Date of Admission: 5/25/2023   Date of Service: 5/26/2023 I Hospital Day: 1    Assessment/Plan   * Respiratory failure with hypoxia [facility])  Assessment & Plan  Patient presents with progressively worsening shortness of breath over the last couple of months  Now only able to ambulate couple of feet prior to requiring rest   · Hypoxic in the 80s today, currently requiring 4 L via nasal cannula  · Lungs clear to auscultation, no rales/wheezing  · CTA chest- no PE  Innumerable large nodules/masses throughout both lung fields, significantly increased in size and number when compared with the prior CT study compatible with significantly progressed metastatic disease  · Suspect secondary to progression of metastatic disease  Allergies may also be contributing as patient reports scratchy throat and nasal congestion  · Will trial nebulizers for symptomatic relief  · Claritin and Flonase for allergic symptoms  · Dr Yang Javed spoke with pt and family regarding progression of metastatic disease  Pt understanding and transitioning to home hospice  · Hospice + palliative consulted  CVA (cerebral vascular accident) [facility])  Assessment & Plan  Continue ASA 81 mg daily  Did not tolerate Lipitor 40 mg, back on her rosuvastatin 20 mg daily    UTI (urinary tract infection)  Assessment & Plan  UA with possible infection  Immunosuppressed due to chemotherapy  We will empirically treat with ceftriaxone x5 days    HTN (hypertension)  Assessment & Plan  Continue amlodipine, lisinopril, Lasix    Type 2 diabetes mellitus without complication [facility])  Assessment & Plan  Lab Results   Component Value Date    HGBA1C 6 4 (H) 04/14/2023       Recent Labs     05/25/23  1645 05/25/23  2058 05/26/23  0717 05/26/23  1055   POCGLU 155* 136 162* 230*       Blood Sugar Average: Last 72 hrs:  (P) 170  75Home regimen: Januvia and metformin  Hold oral antihyperglycemic's while inpatient  Start sliding scale insulin  Diabetic diet    Endometrial cancer [facility])  Assessment & Plan  Follows with Dr Ally Paige  Has been receiving chemotherapy, however not tolerating it very well  CT imaging today shows progression of metastatic disease  Consult gyn onc - saw pt, recommending hospice               VTE Pharmacologic Prophylaxis: VTE Score: 8 High Risk (Score >/= 5) - Pharmacological DVT Prophylaxis Ordered: enoxaparin (Lovenox)  Sequential Compression Devices Ordered  Patient Centered Rounds: I performed bedside rounds with nursing staff today  Discussions with Specialists or Other Care Team Provider: Reviewed Gyn-onc note    Education and Discussions with Family / Patient: Gyn-onc to update family regarding progression of metastatic disease  Total Time Spent on Date of Encounter in care of patient: 55 minutes This time was spent on one or more of the following: performing physical exam; counseling and coordination of care; obtaining or reviewing history; documenting in the medical record; reviewing/ordering tests, medications or procedures; communicating with other healthcare professionals and discussing with patient's family/caregivers  Current Length of Stay: 1 day(s)  Current Patient Status: Inpatient   Certification Statement: The patient will continue to require additional inpatient hospital stay due to Awaiting hospice  Discharge Plan: Anticipate discharge tomorrow to Home hospice    Code Status: Level 3 - DNAR and DNI    Subjective:   Patient reports improvement in breathing with nebulizers and oxygen, spoke with Dr Ally Paige who is her gyn-oncologist and recommended hospice, patient is understanding      Objective:     Vitals:   Temp (24hrs), Av 1 °F (36 7 °C), Min:97 9 °F (36 6 °C), Max:98 1 °F (36 7 °C)    Temp:  [97 9 °F (36 6 °C)-98 1 °F (36 7 °C)] 98 1 °F (36 7 °C)  HR:  [] 102  Resp:  [20-24] 23  BP: (111-130)/(45-58) 116/47  SpO2:  [91 %-95 %] 95 %  Body mass index is 34 2 kg/m²  Input and Output Summary (last 24 hours): Intake/Output Summary (Last 24 hours) at 5/26/2023 1223  Last data filed at 5/26/2023 0801  Gross per 24 hour   Intake 382 ml   Output 400 ml   Net -18 ml       Physical Exam:   Physical Exam  Vitals and nursing note reviewed  Constitutional:       Appearance: Normal appearance  HENT:      Head: Normocephalic and atraumatic  Mouth/Throat:      Mouth: Mucous membranes are moist       Pharynx: Oropharynx is clear  No oropharyngeal exudate  Eyes:      Extraocular Movements: Extraocular movements intact  Cardiovascular:      Rate and Rhythm: Normal rate and regular rhythm  Pulses: Normal pulses  Heart sounds: Normal heart sounds  No murmur heard  No friction rub  No gallop  Pulmonary:      Effort: Pulmonary effort is normal  No respiratory distress  Breath sounds: Normal breath sounds  No stridor  No wheezing or rales  Comments: Nasal cannula  Abdominal:      General: Abdomen is flat  Bowel sounds are normal  There is no distension  Palpations: Abdomen is soft  Tenderness: There is no abdominal tenderness  Musculoskeletal:      Right lower leg: No edema  Left lower leg: No edema  Skin:     General: Skin is warm and dry  Neurological:      General: No focal deficit present  Mental Status: She is alert and oriented to person, place, and time            Additional Data:     Labs:  Results from last 7 days   Lab Units 05/26/23  0621   BANDS PCT % 2   EOS PCT % 1   HEMATOCRIT % 22 6*   HEMOGLOBIN g/dL 6 8*   LYMPHO PCT % 25   MONO PCT % 15*   PLATELETS Thousands/uL 109*   WBC Thousand/uL 5 95     Results from last 7 days   Lab Units 05/26/23  0621 05/25/23  1018   ANION GAP mmol/L 10 12   ALBUMIN g/dL  --  3 3*   ALK PHOS U/L  --  109*   ALT U/L  --  8   AST U/L  --  16   BUN mg/dL 28* 35*   CALCIUM mg/dL 9 2 9 4   CHLORIDE mmol/L 105 103   CO2 mmol/L 21 19*   CREATININE mg/dL 1 04 1 15   GLUCOSE RANDOM mg/dL 156* 168*   POTASSIUM mmol/L 4 8 4 9   SODIUM mmol/L 136 134*   TOTAL BILIRUBIN mg/dL  --  0 70     Results from last 7 days   Lab Units 05/25/23  1018   INR  1 14     Results from last 7 days   Lab Units 05/26/23  1055 05/26/23  0717 05/25/23  2058 05/25/23  1645   POC GLUCOSE mg/dl 230* 162* 136 155*         Results from last 7 days   Lab Units 05/25/23  1018   LACTIC ACID mmol/L 1 5   PROCALCITONIN ng/ml 1 07*       Lines/Drains:  Invasive Devices     Central Venous Catheter Line  Duration           Port A Cath 03/07/22 Right Subclavian 444 days                Central Line:  Goal for removal: N/A - Chronic PICC             Imaging: No pertinent imaging reviewed  Recent Cultures (last 7 days):   Results from last 7 days   Lab Units 05/25/23  1018   BLOOD CULTURE  Received in Microbiology Lab  Culture in Progress  Received in Microbiology Lab  Culture in Progress         Last 24 Hours Medication List:   Current Facility-Administered Medications   Medication Dose Route Frequency Provider Last Rate   • acetaminophen  650 mg Oral Q6H PRN Christy Morris PA-C     • aluminum-magnesium hydroxide-simethicone  30 mL Oral Q6H PRN Christy Morris PA-C     • amLODIPine  10 mg Oral Daily Christy Morris PA-C     • aspirin  81 mg Oral Daily Christy Morris PA-C     • budesonide  0 5 mg Nebulization Q12H Christy Morris PA-C     • cefTRIAXone  1,000 mg Intravenous Q24H Christy Morris PA-C 1,000 mg (05/26/23 0953)   • enoxaparin  40 mg Subcutaneous Daily Christy Morris PA-C     • fluticasone  1 spray Each Nare Daily Christy Morris PA-C     • furosemide  40 mg Oral Daily Christy Morris PA-C     • insulin lispro  1-6 Units Subcutaneous 4x Daily (AC & HS) Christy Morris PA-C     • ipratropium  0 5 mg Nebulization TID Bibi Cee MD     • levalbuterol  0 63 mg Nebulization TID Bibi Cee MD     • lisinopril  40 mg Oral Daily [place], [place]     • loratadine  10 mg Oral Daily [place], [place]     • LORazepam  0 5 mg Oral BID BETTE Bear     • ondansetron  4 mg Intravenous Q6H PRN BETTE Bear     • oxyCODONE  2 5 mg Oral Q3H PRN Alee Swan PA-C     • pantoprazole  40 mg Oral Early Morning BETTE Bear     • rosuvastatin  20 mg Oral Daily With [place], [place]          Today, Patient Was Seen By: BETTE Bear    **Please Note: This note may have been constructed using a voice recognition system  **

## 2023-05-26 NOTE — CONSULTS
"      Consultation - Palliative and [address] 75 y o  female [phone]    Assessment/Problems Actively Addressed:  Goals of care counseling  Encounter for Palliative and Supportive Care  Respiratory failure with hypoxia  Metastatic endometrial cancer  Extensive pulmonary metastasis  Hepatic metastasis  Dyspnea    Plan:  1  Symptom management:  · Patient states her dyspnea is improved while at rest, and after nebulization  · Will ensure hospice provides nebulization machine and medicine as well  · Discussed low-dose opioid to assist with air hunger after exertion and patient is agreeable to this  Will order low-dose oxycodone    2  Goals / Recommendations:    · Level 3 code status  · I met with Charu Brown at bedside and provided emotional support  She is interested in transitioning to hospice  She appreciates Dr Aryan Melgoza upfront yet caring way of speaking with her  · At this time, Analy's spouse Kishan Gary is not aware of her decision to transition to hospice, neither is her son  Per Charu Brown, she has asked Dr John Paul Tyler to update Kishan Gary today  She does not need anyone to update her son at the current time  · Charu Brown is processing well however she believes it is \"hitting her\" today  · We discussed coping and processing, plan to focus on today and take things one day at a time  Social support:  • Time spent providing supportive listening  • Patient is finding comfort excellent family support and in her ashtyn  • Validated patient and family experience  • Provided emotional processing and anxiety containment              I have reviewed the patient's controlled substance dispensing history in the Prescription Drug Monitoring Program in compliance with the [facility] regulations before prescribing any controlled substances    Last refills  Filled  Written  Sold  ID  Drug  QTY  Days  Prescriber  RX #  Dispenser  Refill  Daily Dose*  Pymt Type       05/18/2023 11/23/2022   1  Lorazepam 0 5 Mg Tablet 60 00  30  " Me Chi  [id number]   Kelly ([id number])   1 00 LME  Comm Ins  [place]     04/23/2023 11/17/2022   1  Lorazepam 0 5 Mg Tablet 60 00  30  Me Chi          Decisional apparatus:  Patient is competent on exam today  If competence is lost, patient's substitute decision maker would default to spouse Robert by [place] Act 169  Advance Directive/Living Will/POLST: none on file    We appreciate the invitation to be involved in this patient's care  We will continue to follow throughout this hospitalization  Please do not hesitate to reach our on call provider through our clinic answering service at  should you have acute symptom control concerns  Marlene De Souza PA-C  Palliative and Supportive Care  Clinic/Answering Service: [phone]  You can find me on [employer]! IDENTIFICATION:  Inpatient consult to Palliative Care  Consult performed by: Marlene De Souza PA-C  Consult ordered by: Luisa Ocampo MD        Physician Requesting Consult: Eleazar Mohan MD  Reason for Consult / Principal Problem: GOC counseling and SM secondary to dyspnea, metastatic endometrial cancer    History of Present Illness:  Angely Polo is a 75 y o  female who presents with increasing dyspnea  Maryam Berry has a known diagnosis of endometrial cancer has recent history of TIA  She had poor tolerance of treatment including fatigue and pancytopenia  She had an appointment with Dr Bakari Ramirez on 5/25 who reviewed that she should do treatment holiday because patient reported poor quality of life  The patient was also extremely short of breath and was referred to the ER  CTA chest revealed significant progression of metastatic disease in the lungs  Dr Bakari Ramirez discussed goals with Maryam Berry and recommended hospice care, to which she is agreeable  Palliative and supportive care has been consulted for dyspnea  Amado Koo at bedside on 5/26/23  She is currently unaccompanied  She welcomes her visit    I introduced myself and how I work with the oncology team including Dr Herlinda Sapp  We discussed Analy's plan for the future and her coping  This year is her 41th wedding anniversary and she was hoping to make it to her 49th and is now realizing many of her expectations will not be met  Specifically, she mentions that her  retires late this summer and she was hopeful for more time with him  We discussed her social life and her family  Discussed symptom management and the next steps  Review of Systems:   Review of Systems   Constitutional: Positive for decreased appetite (+++) and malaise/fatigue  HENT: Negative for congestion and hoarse voice  Cardiovascular: Positive for dyspnea on exertion  Negative for chest pain and cyanosis  Respiratory: Positive for cough, shortness of breath and sputum production  Negative for sleep disturbances due to breathing  Skin: Negative for poor wound healing  Musculoskeletal: Negative for arthritis, back pain and falls  Gastrointestinal: Negative for bloating, abdominal pain, anorexia, nausea and vomiting  Genitourinary: Negative for bladder incontinence  Neurological: Positive for excessive daytime sleepiness  Negative for aphonia and difficulty with concentration  Psychiatric/Behavioral: Negative for altered mental status, hallucinations and hypervigilance         Past Medical History:   Diagnosis Date   • Anxiety    • Arthritis    • Back pain    • Cancer (HCC)    • Depression    • Diabetes mellitus ([facility] [address] )    • Disease of thyroid gland    • Endometrial cancer ([facility] [address] )    • GERD (gastroesophageal reflux disease)    • Hyperlipidemia associated with type 2 diabetes mellitus ([facility] [address] )    • Hypertension    • Morbid obesity with BMI of 40 0-44 9, adult ([facility] [address] )    • Type 2 diabetes mellitus without complication ([facility] [address] )    • Urinary incontinence    • Wears glasses      Past Surgical History:   Procedure Laterality Date   • ABDOMINAL ADHESION SURGERY N/A 5/31/2022    Procedure: LYSIS ADHESIONS Lan Bonilla;  Surgeon: Jenna Estrella MD;  Location: [facility];  Service: Gynecology Oncology   • CATARACT EXTRACTION W/ INTRAOCULAR LENS IMPLANT Bilateral    • CHOLECYSTECTOMY OPEN     • CYSTOSCOPY N/A 5/31/2022    Procedure: Peewee Goldsmith;  Surgeon: Jenna Estrella MD;  Location: [facility];  Service: Gynecology Oncology   • IR PORT PLACEMENT  03/07/2022   • AL LAPS TOTAL HYSTERECT 250 GM/< W/RMVL TUBE/OVARY N/A 5/31/2022    Procedure: ROBOTIC ASSISTED TOTAL LAPAROSCOPIC HYSTERECTOMY, RIGHT SALPINGO-OOPHORECTOMY, LEFT SALPINGECTOMY,;  Surgeon: Jenna Estrella MD;  Location: [facility];  Service: Gynecology Oncology   • SALPINGOOPHORECTOMY N/A     only had one removed and not sure which   • TONSILLECTOMY       Social History     Socioeconomic History   • Marital status: /Civil Union     Spouse name: Not on file   • Number of children: Not on file   • Years of education: Not on file   • Highest education level: Not on file   Occupational History   • Not on file   Tobacco Use   • Smoking status: Never     Passive exposure: Never   • Smokeless tobacco: Never   Vaping Use   • Vaping Use: Never used   Substance and Sexual Activity   • Alcohol use: Not Currently   • Drug use: Never   • Sexual activity: Not on file   Other Topics Concern   • Not on file   Social History Narrative   • Not on file     Social Determinants of Health     Financial Resource Strain: Not on file   Food Insecurity: No Food Insecurity (4/14/2023)    Hunger Vital Sign    • Worried About Running Out of Food in the Last Year: Never true    • Ran Out of Food in the Last Year: Never true   Transportation Needs: No Transportation Needs (4/14/2023)    PRAPARE - Transportation    • Lack of Transportation (Medical): No    • Lack of Transportation (Non-Medical):  No   Physical Activity: Not on file   Stress: Not on file   Social Connections: Not on file   Intimate Partner Violence: Not on file   Housing Stability: Low Risk  (4/14/2023)    Housing Stability Vital Sign    • Unable to Pay for Housing in the Last Year: No    • Number of Places Lived in the Last Year: 1    • Unstable Housing in the Last Year: No     Family History   Problem Relation Age of Onset   • Pancreatic cancer Father    • Colon cancer Maternal Aunt        Medications:  all current active meds have been reviewed and current meds:   Current Facility-Administered Medications   Medication Dose Route Frequency   • acetaminophen (TYLENOL) tablet 650 mg  650 mg Oral Q6H PRN   • aluminum-magnesium hydroxide-simethicone (MYLANTA) oral suspension 30 mL  30 mL Oral Q6H PRN   • amLODIPine (NORVASC) tablet 10 mg  10 mg Oral Daily   • aspirin (ECOTRIN LOW STRENGTH) EC tablet 81 mg  81 mg Oral Daily   • budesonide (PULMICORT) inhalation solution 0 5 mg  0 5 mg Nebulization Q12H   • cefTRIAXone (ROCEPHIN) IVPB (premix in dextrose) 1,000 mg 50 mL  1,000 mg Intravenous Q24H   • enoxaparin (LOVENOX) subcutaneous injection 40 mg  40 mg Subcutaneous Daily   • fluticasone (FLONASE) 50 mcg/act nasal spray 1 spray  1 spray Each Nare Daily   • furosemide (LASIX) tablet 40 mg  40 mg Oral Daily   • insulin lispro (HumaLOG) 100 units/mL subcutaneous injection 1-6 Units  1-6 Units Subcutaneous 4x Daily (AC & HS)   • ipratropium (ATROVENT) 0 02 % inhalation solution 0 5 mg  0 5 mg Nebulization TID   • levalbuterol (XOPENEX) inhalation solution 0 63 mg  0 63 mg Nebulization TID   • lisinopril (ZESTRIL) tablet 40 mg  40 mg Oral Daily   • loratadine (CLARITIN) tablet 10 mg  10 mg Oral Daily   • LORazepam (ATIVAN) tablet 0 5 mg  0 5 mg Oral BID   • ondansetron (ZOFRAN) injection 4 mg  4 mg Intravenous Q6H PRN   • pantoprazole (PROTONIX) EC tablet 40 mg  40 mg Oral Early Morning   • rosuvastatin (CRESTOR) tablet 20 mg  20 mg Oral Daily With [place]       Allergies   Allergen Reactions   • Empagliflozin Other (See Comments)     Yeast infection   • Exenatide Nausea Only   • Glipizide Other (See Comments)     hypoglycemic   • Repaglinide Nausea Only         Medications    Current Facility-Administered Medications:   •  acetaminophen (TYLENOL) tablet 650 mg, 650 mg, Oral, Q6H PRN, BETTE Malagon  •  aluminum-magnesium hydroxide-simethicone (MYLANTA) oral suspension 30 mL, 30 mL, Oral, Q6H PRN, BETTE Malagon  •  amLODIPine (NORVASC) tablet 10 mg, 10 mg, Oral, Daily, KATHLEEN Malagon-CAYETANO, 10 mg at 05/26/23 0455  •  aspirin (ECOTRIN LOW STRENGTH) EC tablet 81 mg, 81 mg, Oral, Daily, KATHLEEN Malagon-C, 81 mg at 05/26/23 8399  •  budesonide (PULMICORT) inhalation solution 0 5 mg, 0 5 mg, Nebulization, Q12H, BETTE Malagon, 0 5 mg at 05/26/23 9824  •  cefTRIAXone (ROCEPHIN) IVPB (premix in dextrose) 1,000 mg 50 mL, 1,000 mg, Intravenous, Q24H, BETTE Malagon  •  enoxaparin (LOVENOX) subcutaneous injection 40 mg, 40 mg, Subcutaneous, Daily, BETTE Malagon, 40 mg at 05/25/23 1647  •  fluticasone (FLONASE) 50 mcg/act nasal spray 1 spray, 1 spray, Each Nare, Daily, BETTE Malagon  •  furosemide (LASIX) tablet 40 mg, 40 mg, Oral, Daily, KATHLEEN Malagon-C, 40 mg at 05/26/23 0818  •  insulin lispro (HumaLOG) 100 units/mL subcutaneous injection 1-6 Units, 1-6 Units, Subcutaneous, 4x Daily (AC & HS), 1 Units at 05/26/23 0816 **AND** Fingerstick Glucose (POCT), , , 4x Daily AC and at bedtime, BETTE Malagon  •  ipratropium (ATROVENT) 0 02 % inhalation solution 0 5 mg, 0 5 mg, Nebulization, TID, Jadiel Krishna MD, 0 5 mg at 05/26/23 2002  •  levalbuterol (XOPENEX) inhalation solution 0 63 mg, 0 63 mg, Nebulization, TID, 0 63 mg at 05/26/23 0852 **AND** [DISCONTINUED] ipratropium (ATROVENT HFA) inhaler 2 puff, 2 puff, Inhalation, 4x Daily, Jadiel Krishna MD  •  lisinopril (ZESTRIL) tablet 40 mg, 40 mg, Oral, Daily, BETTE Malagon, 40 mg at 05/26/23 0818  •  loratadine (CLARITIN) tablet 10 mg, 10 mg, Oral, Daily, BETTE Malagon, 10 "mg at 05/26/23 0818  •  LORazepam (ATIVAN) tablet 0 5 mg, 0 5 mg, Oral, BID, Delvis Pleitez PA-C, 0 5 mg at 05/25/23 2148  •  ondansetron (ZOFRAN) injection 4 mg, 4 mg, Intravenous, Q6H PRN, Delvis Pleitez PA-C  •  pantoprazole (PROTONIX) EC tablet 40 mg, 40 mg, Oral, Early Morning, Delvis Pleitez PA-C, 40 mg at 05/26/23 4569  •  rosuvastatin (CRESTOR) tablet 20 mg, 20 mg, Oral, Daily With Bethanie Hurt PA-C    Objective  BP (!) 116/47   Pulse 102   Temp 98 1 °F (36 7 °C)   Resp (!) 23   Ht 5' 2\" (1 575 m)   Wt 84 8 kg (187 lb)   SpO2 95%   BMI 34 20 kg/m²     Physical Exam  Vitals and nursing note reviewed  Exam conducted with a chaperone present  Constitutional:       General: She is not in acute distress  Appearance: She is obese  She is ill-appearing  She is not toxic-appearing  Interventions: Nasal cannula in place  HENT:      Head: Normocephalic and atraumatic  Eyes:      General:         Right eye: No discharge  Left eye: No discharge  Conjunctiva/sclera: Conjunctivae normal    Pulmonary:      Effort: Pulmonary effort is normal  No respiratory distress  Musculoskeletal:         General: No swelling  Cervical back: Neck supple  Skin:     General: Skin is warm and dry  Coloration: Skin is pale  Neurological:      Mental Status: She is alert and oriented to person, place, and time  Psychiatric:         Mood and Affect: Mood normal          Behavior: Behavior normal        Lab Results:   I have personally reviewed pertinent labs  , CBC:   Lab Results   Component Value Date    HCT 22 6 (L) 05/26/2023    HGB 6 8 (LL) 05/26/2023    MCH 25 1 (L) 05/26/2023    MCHC 30 1 (L) 05/26/2023    MCV 83 05/26/2023    NRBC 10 (H) 05/26/2023     (L) 05/26/2023    RBC 2 71 (L) 05/26/2023    RDW 21 9 (H) 05/26/2023    WBC 5 95 05/26/2023   , CMP:   Lab Results   Component Value Date    ALKPHOS 109 (H) 05/25/2023    ALT 8 05/25/2023    AST 16 05/25/2023 " "   BUN 28 (H) 05/26/2023    CALCIUM 9 2 05/26/2023     05/26/2023    CO2 21 05/26/2023    CREATININE 1 04 05/26/2023    EGFR 55 05/26/2023    K 4 8 05/26/2023    SODIUM 136 05/26/2023     Imaging Studies: I have personally reviewed pertinent reports  EKG, Pathology, and Other Studies: I have personally reviewed pertinent reports  Counseling / Coordination of Care  Total floor / unit time spent today 70 minutes  Greater than 50% of total time was spent with the patient and / or family counseling and / or coordination of care  A description of the counseling / coordination of care: reviewed chart, reviewed lab values, reviewed imaging, provided medical updates, discussed hospice care and comfort care, opioid titration, discussed goals of care, provided supportive listening, provided anticipatory guidance, provided psychosocial and emotional support, assessed competency and decision-making and facilitated interdisciplinary communication  Reviewed with SLIM team, RN and CM  Portions of this document may have been created using dictation software and as such some \"sound alike\" terms may have been generated by the system  Do not hesitate to contact me with any questions or clarifications     "

## 2023-05-26 NOTE — QUICK NOTE
I touched base with Sanju Rodriguez of [facility] ([phone])  He is on his way to the hospital to meet with the patient  I sent medications for comfort to patient's pharmacy and he is aware  For urgent issues or any questions/concerns, please notify on-call provider via [facility]  You may also call our answering service 24/7 at   Palak Hutchison PA-C  Palliative and Supportive Care  Clinic/Answering Service: [phone]  You can find me on [employer]!

## 2023-05-27 VITALS
SYSTOLIC BLOOD PRESSURE: 131 MMHG | HEART RATE: 107 BPM | HEIGHT: 62 IN | WEIGHT: 187 LBS | RESPIRATION RATE: 14 BRPM | DIASTOLIC BLOOD PRESSURE: 52 MMHG | TEMPERATURE: 98.1 F | OXYGEN SATURATION: 94 % | BODY MASS INDEX: 34.41 KG/M2

## 2023-05-27 PROBLEM — D64.9 ANEMIA: Status: ACTIVE | Noted: 2023-05-27

## 2023-05-27 LAB
ABO GROUP BLD: NORMAL
ANION GAP SERPL CALCULATED.3IONS-SCNC: 12 MMOL/L (ref 4–13)
BACTERIA UR CULT: ABNORMAL
BACTERIA UR CULT: ABNORMAL
BLD GP AB SCN SERPL QL: NEGATIVE
BUN SERPL-MCNC: 36 MG/DL (ref 5–25)
CALCIUM SERPL-MCNC: 9.4 MG/DL (ref 8.4–10.2)
CHLORIDE SERPL-SCNC: 102 MMOL/L (ref 96–108)
CO2 SERPL-SCNC: 19 MMOL/L (ref 21–32)
CREAT SERPL-MCNC: 1.25 MG/DL (ref 0.6–1.3)
ERYTHROCYTE [DISTWIDTH] IN BLOOD BY AUTOMATED COUNT: 21.8 % (ref 11.6–15.1)
GFR SERPL CREATININE-BSD FRML MDRD: 44 ML/MIN/1.73SQ M
GLUCOSE SERPL-MCNC: 186 MG/DL (ref 65–140)
GLUCOSE SERPL-MCNC: 193 MG/DL (ref 65–140)
GLUCOSE SERPL-MCNC: 212 MG/DL (ref 65–140)
GLUCOSE SERPL-MCNC: 233 MG/DL (ref 65–140)
HCT VFR BLD AUTO: 21.3 % (ref 34.8–46.1)
HGB BLD-MCNC: 6.3 G/DL (ref 11.5–15.4)
MCH RBC QN AUTO: 24.9 PG (ref 26.8–34.3)
MCHC RBC AUTO-ENTMCNC: 29.6 G/DL (ref 31.4–37.4)
MCV RBC AUTO: 84 FL (ref 82–98)
PLATELET # BLD AUTO: 99 THOUSANDS/UL (ref 149–390)
POTASSIUM SERPL-SCNC: 4.4 MMOL/L (ref 3.5–5.3)
RBC # BLD AUTO: 2.53 MILLION/UL (ref 3.81–5.12)
RH BLD: POSITIVE
SODIUM SERPL-SCNC: 133 MMOL/L (ref 135–147)
SPECIMEN EXPIRATION DATE: NORMAL
WBC # BLD AUTO: 5.76 THOUSAND/UL (ref 4.31–10.16)

## 2023-05-27 PROCEDURE — 30233N1 TRANSFUSION OF NONAUTOLOGOUS RED BLOOD CELLS INTO PERIPHERAL VEIN, PERCUTANEOUS APPROACH: ICD-10-PCS | Performed by: HOSPITALIST

## 2023-05-27 RX ORDER — LEVALBUTEROL INHALATION SOLUTION 0.63 MG/3ML
0.63 SOLUTION RESPIRATORY (INHALATION)
Qty: 270 ML | Refills: 0 | Status: SHIPPED | OUTPATIENT
Start: 2023-05-27 | End: 2023-06-26

## 2023-05-27 RX ORDER — CEFADROXIL 500 MG/1
500 CAPSULE ORAL EVERY 12 HOURS SCHEDULED
Qty: 4 CAPSULE | Refills: 0 | Status: SHIPPED | OUTPATIENT
Start: 2023-05-27 | End: 2023-05-29

## 2023-05-27 RX ORDER — ALBUTEROL SULFATE 90 UG/1
2 AEROSOL, METERED RESPIRATORY (INHALATION) EVERY 4 HOURS PRN
Status: DISCONTINUED | OUTPATIENT
Start: 2023-05-27 | End: 2023-05-27 | Stop reason: HOSPADM

## 2023-05-27 RX ADMIN — PANTOPRAZOLE SODIUM 40 MG: 40 TABLET, DELAYED RELEASE ORAL at 05:58

## 2023-05-27 RX ADMIN — ACETAMINOPHEN 650 MG: 325 TABLET, FILM COATED ORAL at 05:58

## 2023-05-27 RX ADMIN — LEVALBUTEROL HYDROCHLORIDE 0.63 MG: 0.63 SOLUTION RESPIRATORY (INHALATION) at 13:44

## 2023-05-27 RX ADMIN — IPRATROPIUM BROMIDE 0.5 MG: 0.5 SOLUTION RESPIRATORY (INHALATION) at 13:44

## 2023-05-27 RX ADMIN — INSULIN LISPRO 1 UNITS: 100 INJECTION, SOLUTION INTRAVENOUS; SUBCUTANEOUS at 07:38

## 2023-05-27 RX ADMIN — LEVALBUTEROL HYDROCHLORIDE 0.63 MG: 0.63 SOLUTION RESPIRATORY (INHALATION) at 08:18

## 2023-05-27 RX ADMIN — INSULIN LISPRO 2 UNITS: 100 INJECTION, SOLUTION INTRAVENOUS; SUBCUTANEOUS at 12:02

## 2023-05-27 RX ADMIN — ASPIRIN 81 MG: 81 TABLET, COATED ORAL at 07:40

## 2023-05-27 RX ADMIN — IPRATROPIUM BROMIDE 0.5 MG: 0.5 SOLUTION RESPIRATORY (INHALATION) at 08:18

## 2023-05-27 RX ADMIN — INSULIN LISPRO 3 UNITS: 100 INJECTION, SOLUTION INTRAVENOUS; SUBCUTANEOUS at 16:51

## 2023-05-27 RX ADMIN — ROSUVASTATIN CALCIUM 20 MG: 20 TABLET, FILM COATED ORAL at 16:52

## 2023-05-27 RX ADMIN — CEFTRIAXONE 1000 MG: 1 INJECTION, SOLUTION INTRAVENOUS at 10:49

## 2023-05-27 RX ADMIN — LORATADINE 10 MG: 10 TABLET ORAL at 07:42

## 2023-05-27 RX ADMIN — BUDESONIDE 0.5 MG: 0.5 INHALANT ORAL at 08:18

## 2023-05-27 NOTE — PLAN OF CARE
Problem: Potential for Falls  Goal: Patient will remain free of falls  Description: INTERVENTIONS:  - Educate patient/family on patient safety including physical limitations  - Instruct patient to call for assistance with activity   - Consult OT/PT to assist with strengthening/mobility   - Keep Call bell within reach  - Keep bed low and locked with side rails adjusted as appropriate  - Keep care items and personal belongings within reach  - Initiate and maintain comfort rounds  - Make Fall Risk Sign visible to staff  - Offer Toileting every 2 Hours, in advance of need  - Initiate/Maintain bed alarm  - Obtain necessary fall risk management equipment: socks  - Apply yellow socks and bracelet for high fall risk patients  - Consider moving patient to room near nurses station  Outcome: Progressing     Problem: PAIN - ADULT  Goal: Verbalizes/displays adequate comfort level or baseline comfort level  Description: Interventions:  - Encourage patient to monitor pain and request assistance  - Assess pain using appropriate pain scale  - Administer analgesics based on type and severity of pain and evaluate response  - Implement non-pharmacological measures as appropriate and evaluate response  - Consider cultural and social influences on pain and pain management  - Notify physician/advanced practitioner if interventions unsuccessful or patient reports new pain  Outcome: Progressing     Problem: INFECTION - ADULT  Goal: Absence or prevention of progression during hospitalization  Description: INTERVENTIONS:  - Assess and monitor for signs and symptoms of infection  - Monitor lab/diagnostic results  - Monitor all insertion sites, i e  indwelling lines, tubes, and drains  - Monitor endotracheal if appropriate and nasal secretions for changes in amount and color  - [place] appropriate cooling/warming therapies per order  - Administer medications as ordered  - Instruct and encourage patient and family to use good hand hygiene technique  - Identify and instruct in appropriate isolation precautions for identified infection/condition  Outcome: Progressing  Goal: Absence of fever/infection during neutropenic period  Description: INTERVENTIONS:  - Monitor WBC    Outcome: Progressing     Problem: SAFETY ADULT  Goal: Patient will remain free of falls  Description: INTERVENTIONS:  - Educate patient/family on patient safety including physical limitations  - Instruct patient to call for assistance with activity   - Consult OT/PT to assist with strengthening/mobility   - Keep Call bell within reach  - Keep bed low and locked with side rails adjusted as appropriate  - Keep care items and personal belongings within reach  - Initiate and maintain comfort rounds  - Make Fall Risk Sign visible to staff  - Offer Toileting every 2 Hours, in advance of need  - Initiate/Maintain bed alarm  - Obtain necessary fall risk management equipment: socks  - Apply yellow socks and bracelet for high fall risk patients  - Consider moving patient to room near nurses station  Outcome: Progressing  Goal: Maintain or return to baseline ADL function  Description: INTERVENTIONS:  -  Assess patient's ability to carry out ADLs; assess patient's baseline for ADL function and identify physical deficits which impact ability to perform ADLs (bathing, care of mouth/teeth, toileting, grooming, dressing, etc )  - Assess/evaluate cause of self-care deficits   - Assess range of motion  - Assess patient's mobility; develop plan if impaired  - Assess patient's need for assistive devices and provide as appropriate  - Encourage maximum independence but intervene and supervise when necessary  - Involve family in performance of ADLs  - Assess for home care needs following discharge   - Consider OT consult to assist with ADL evaluation and planning for discharge  - Provide patient education as appropriate  Outcome: Progressing  Goal: Maintains/Returns to pre admission functional level  Description: INTERVENTIONS:  - Perform BMAT or MOVE assessment daily    - Set and communicate daily mobility goal to care team and patient/family/caregiver  - Collaborate with rehabilitation services on mobility goals if consulted  - Perform Range of Motion 3 times a day  - Reposition patient every 2 hours  - Dangle patient 3 times a day  - Stand patient 3 times a day  - Ambulate patient 3 times a day  - Out of bed to chair 3 times a day   - Out of bed for meals 3 times a day  - Out of bed for toileting  - Record patient progress and toleration of activity level   Outcome: Progressing     Problem: DISCHARGE PLANNING  Goal: Discharge to home or other facility with appropriate resources  Description: INTERVENTIONS:  - Identify barriers to discharge w/patient and caregiver  - Arrange for needed discharge resources and transportation as appropriate  - Identify discharge learning needs (meds, wound care, etc )  - Arrange for interpretive services to assist at discharge as needed  - Refer to Case Management Department for coordinating discharge planning if the patient needs post-hospital services based on physician/advanced practitioner order or complex needs related to functional status, cognitive ability, or social support system  Outcome: Progressing     Problem: Knowledge Deficit  Goal: Patient/family/caregiver demonstrates understanding of disease process, treatment plan, medications, and discharge instructions  Description: Complete learning assessment and assess knowledge base    Interventions:  - Provide teaching at level of understanding  - Provide teaching via preferred learning methods  Outcome: Progressing

## 2023-05-27 NOTE — ASSESSMENT & PLAN NOTE
Follows with Dr Bakari Ramirez  Has been receiving chemotherapy, however not tolerating it very well  CT imaging today shows progression of metastatic disease  Consult gyn onc - saw pt, recommending hospice

## 2023-05-27 NOTE — ASSESSMENT & PLAN NOTE
Follows with Dr Chey Guillermo  Has been receiving chemotherapy, however not tolerating it very well  CT imaging today shows progression of metastatic disease  Consult gyn onc - saw pt, recommending hospice

## 2023-05-27 NOTE — CASE MANAGEMENT
Case Management Discharge Planning Note    Patient name Chucky Case  Location /-01 MRN [id number]  : 1955 Date 2023       Current Admission Date: 2023  Current Admission Diagnosis:Respiratory failure with hypoxia [facility])   Patient Active Problem List    Diagnosis Date Noted   • Respiratory failure with hypoxia ([facility] ) 2023   • UTI (urinary tract infection) 2023   • CVA (cerebral vascular accident) ([facility] ) 2023   • HTN (hypertension) 2022   • Hypertensive urgency 2022   • Headache 2022   • Chemotherapy-induced thrombocytopenia 2022   • Antineoplastic chemotherapy induced pancytopenia (CODE) ([facility] ) 2022   • Postoperative state 2022   • Endometrial cancer ([facility] ) 2022   • Hyperlipidemia associated with type 2 diabetes mellitus ([facility] ) 2022   • Type 2 diabetes mellitus without complication ([facility] )    • Morbid obesity with BMI of 40 0-44 9, adult ([facility] ) 2022      LOS (days): 2  Geometric Mean LOS (GMLOS) (days): 3 50  Days to GMLOS:1 7     OBJECTIVE:  Risk of Unplanned Readmission Score: 23 67         Current admission status: Inpatient   Preferred Pharmacy:   [address]  BRADLYøbenhmaylin ABDULLAHI [address] [facility]  Phone: [phone] Fax: [phone] [facility] [place], [address]  [address]  [address] [address] [address]  Phone: [phone] Fax: [phone]    Primary Care Provider: Francisca Pacheco DO    Primary Insurance: BLUE CROSS  Secondary Insurance: MEDICARE    DISCHARGE DETAILS:           Spoke with Pawel Oreilly from [address] [place] [address] and equipment will be delivered to home prior to 11am this am  Spouse has portable tank to take pt home with patient  Spouse will transport patient home

## 2023-05-27 NOTE — CASE MANAGEMENT
Case Management Discharge Planning Note    Patient name Ravindra Villareal  Location /-01 MRN [id number]  : 1955 Date 2023       Current Admission Date: 2023  Current Admission Diagnosis:Respiratory failure with hypoxia [facility])   Patient Active Problem List    Diagnosis Date Noted   • Anemia 2023   • Respiratory failure with hypoxia ([facility] ) 2023   • UTI (urinary tract infection) 2023   • CVA (cerebral vascular accident) ([facility] ) 2023   • HTN (hypertension) 2022   • Hypertensive urgency 2022   • Headache 2022   • Chemotherapy-induced thrombocytopenia 2022   • Antineoplastic chemotherapy induced pancytopenia (CODE) ([place] [address] ) 2022   • Postoperative state 2022   • Endometrial cancer ([place] [address] ) 2022   • Hyperlipidemia associated with type 2 diabetes mellitus ([facility] ) 2022   • Type 2 diabetes mellitus without complication ([place] [address] )    • Morbid obesity with BMI of 40 0-44 9, adult ([place] [address] ) 2022      LOS (days): 2  Geometric Mean LOS (GMLOS) (days): 3 50  Days to GMLOS:1 4     OBJECTIVE:  Risk of Unplanned Readmission Score: 23 78         Current admission status: Inpatient   Preferred Pharmacy:   [address]  BRADLYøbenhmaylin K [address] [facility]  Phone: [phone] Fax: [phone]  [address] [address]  Phone: [phone] Fax: [phone]    Primary Care Provider: Iliana Edmonds DO    Primary Insurance: BLUE CROSS  Secondary Insurance: MEDICARE    DISCHARGE DETAILS:     Updated discharge plan with Angela Molina at [address] [place] [address]  Patient will need a blood transfusion and will be discharged home afterwards  Nurse updated with Elda's contact information and he will call when discharged  Oliver Bradford also spoke with pt's spouse and will arrange admission to hospice for the evening

## 2023-05-27 NOTE — ASSESSMENT & PLAN NOTE
Pt with hemoglobin 6 3 this morning, fatigued  No evidence of active bleed, denies melena/hematochezia and other bleeds  Suspect secondary to anemia of chronic disease  Give 1 unit pRBC  Start PO iron supplement  Repeat CBC in 1 week   Plan to discharge to home hospice after blood transfusion

## 2023-05-27 NOTE — DISCHARGE SUMMARY
[place]  Discharge- Liset Poe 1955, 75 y o  female MRN: [id number]  Unit/Bed#: -Jagdeep Encounter: [id number]  Primary Care Provider: Abelino Ruelas DO   Date and time admitted to hospital: 5/25/2023  9:42 AM    * Respiratory failure with hypoxia [facility])  Assessment & Plan  Patient presents with progressively worsening shortness of breath over the last couple of months  Now only able to ambulate couple of feet prior to requiring rest   · Hypoxic in the 80s today, currently requiring 4 L via nasal cannula  · Lungs clear to auscultation, no rales/wheezing  · CTA chest- no PE  Innumerable large nodules/masses throughout both lung fields, significantly increased in size and number when compared with the prior CT study compatible with significantly progressed metastatic disease  · Suspect secondary to progression of metastatic disease  Allergies may also be contributing as patient reports scratchy throat and nasal congestion  · Will trial nebulizers for symptomatic relief  · Claritin and Flonase for allergic symptoms  · Dr Ally Paige spoke with pt and family regarding progression of metastatic disease  Pt understanding and transitioning to home hospice    · Oxygen concentrator sent to patient's house, 4L via NC  · Pt with symptomatic relief with nebulizers, nebulizer machine and nebs also to be sent to house through hospice    CVA (cerebral vascular accident) [facility])  Assessment & Plan  Continue ASA 81 mg daily  Can stop statin    Anemia  Assessment & Plan  Pt with hemoglobin 6 3 this morning, fatigued  No evidence of active bleed, denies melena/hematochezia and other bleeds  Suspect secondary to anemia of chronic disease  Give 1 unit pRBC  Plan to discharge to home hospice after blood transfusion    UTI (urinary tract infection)  Assessment & Plan  UA with possible infection   Urine culture growing E  coli  Immunosuppressed due to chemotherapy  Received 3 days of IV ceftriaxone, discharge on 2 more days of cefadroxil    HTN (hypertension)  Assessment & Plan  Continue lisinopril and Lasix  Discontinue amlodipine, pt's BP on softer side and was started due to chemo-associated HTN, now going on hospice  Type 2 diabetes mellitus without complication [facility])  Assessment & Plan  Lab Results   Component Value Date    HGBA1C 6 4 (H) 04/14/2023       Recent Labs     05/26/23  2112 05/27/23  0712 05/27/23  1043 05/27/23  1615   POCGLU 215* 186* 212* 233*       Blood Sugar Average: Last 72 hrs:  (P) 189 [id number] regimen: Januvia and metformin  Hold oral antihyperglycemic's while inpatient  Start sliding scale insulin  Diabetic diet  Resume home regimen at patients request    Endometrial cancer [facility])  Assessment & Plan  Follows with Dr Robb Reading  Has been receiving chemotherapy, however not tolerating it very well  CT imaging today shows progression of metastatic disease  Consult gyn onc - saw pt, recommending hospice      Medical Problems     Resolved Problems  Date Reviewed: 5/27/2023   None       Discharging Physician / Practitioner: Dora Rueda PA-C  PCP: Mirella Maya DO  Admission Date:   Admission Orders (From admission, onward)     Ordered        05/25/23 1306  INPATIENT ADMISSION  Once                      Discharge Date: 05/27/23    Consultations During Hospital Stay:  · Palliative  · Gyn-onc    Procedures Performed:   · Blood transfusion 1 unit PRBC on 5/27  XR chest 2 views    Result Date: 5/25/2023  Impression: Worsening diffuse bilateral pulmonary metastases  Workstation performed: [id number]     CTA ED chest PE study    Result Date: 5/25/2023  · Impression: No definite CT evidence of pulmonary embolism  Innumerable large nodules/masses throughout both lung fields, significantly increased in size and number when compared with the prior CT study compatible with significantly progressed metastatic disease  Trace loculated pleural effusions posteriorly  Worsening mediastinal and left supraclavicular lymphadenopathy concerning for progressive metastatic disease  Hepatic metastatic disease partially visualized  Workstation performed: [id number]   ·     Significant Findings / Test Results:   · Hemoglobin 6 3  · CTA with increased metastasis in the lungs  · Oxygen 84% on arrival    Incidental Findings:   · None    Test Results Pending at Discharge (will require follow up): · None     Outpatient Tests Requested:  · CBC in 1 week    Complications: None    Reason for Admission: Shortness of breath    Hospital Course:   Brennan Melgoza is a 75 y o  female patient with PMH metastatic endometrial cancer, HLD, HTN, T2DM who originally presented to the hospital on 5/25/2023 due to progressively worsening shortness of breath over the last several months, however now affecting her day-to-day activities and unable to ambulate more than several feet prior to requiring rest which prompted her to come to the emergency department  She underwent CTA chest which did not show any pulmonary embolism, however did show significant progression of metastatic disease into the lungs  Her gyn-oncologist was kind enough to speak with patient regarding her pulmonary metastasis and due to worsening disease burden despite chemotherapy, he recommends stopping all tumor directed therapy and consult hospice  Patient is in agreement and will be transitioning to home hospice  She is requiring 4 L via nasal cannula and oxygen was delivered to her house  Additionally, she reported symptomatic relief with nebulizers and this will also be delivered to her house  She was also noted to have a UTI on urinalysis  Urine cultures grew E  coli  She received 3 days of IV ceftriaxone continue with 2 additional days of cefadroxil  Lastly, on day of discharge, she had a drop in hemoglobin to 6 3 without any active bleeding  She received 1 unit PRBC in order to help with her fatigue and breathing  "    Please see above list of diagnoses and related plan for additional information  Condition at Discharge: guarded    Discharge Day Visit / Exam:   Subjective: Patient reports feeling significantly fatigued today  Otherwise reports her breathing feels improved with nebulizers  Vitals: Blood Pressure: 131/52 (05/27/23 1813)  Pulse: (!) 107 (05/27/23 1813)  Temperature: 98 1 °F (36 7 °C) (05/27/23 1813)  Temp Source: Temporal (05/27/23 1813)  Respirations: 14 (05/27/23 1813)  Height: 5' 2\" (157 5 cm) (05/25/23 1763)  Weight - Scale: 84 8 kg (187 lb) (05/25/23 0922)  SpO2: 94 % (05/27/23 1813)  Exam:   Physical Exam  Vitals and nursing note reviewed  Constitutional:       Appearance: Normal appearance  HENT:      Head: Normocephalic and atraumatic  Mouth/Throat:      Mouth: Mucous membranes are moist       Pharynx: Oropharynx is clear  No oropharyngeal exudate  Eyes:      Extraocular Movements: Extraocular movements intact  Cardiovascular:      Rate and Rhythm: Normal rate and regular rhythm  Pulses: Normal pulses  Heart sounds: Normal heart sounds  No murmur heard  No friction rub  No gallop  Pulmonary:      Effort: Pulmonary effort is normal  No respiratory distress  Breath sounds: Normal breath sounds  No stridor  No wheezing or rales  Abdominal:      General: Abdomen is flat  Bowel sounds are normal  There is no distension  Palpations: Abdomen is soft  Tenderness: There is no abdominal tenderness  Musculoskeletal:      Right lower leg: No edema  Left lower leg: No edema  Skin:     General: Skin is warm and dry  Coloration: Skin is pale  Neurological:      General: No focal deficit present  Mental Status: She is alert and oriented to person, place, and time  Discussion with Family: Updated  () at bedside      Discharge instructions/Information to patient and family:   See after visit summary for information " provided to patient and family  Provisions for Follow-Up Care:  See after visit summary for information related to follow-up care and any pertinent home health orders  Disposition:   Other: Home hospice    Planned Readmission: None     Discharge Statement:  I spent 60 minutes discharging the patient  This time was spent on the day of discharge  I had direct contact with the patient on the day of discharge  Greater than 50% of the total time was spent examining patient, answering all patient questions, arranging and discussing plan of care with patient as well as directly providing post-discharge instructions  Additional time then spent on discharge activities  Discharge Medications:  See after visit summary for reconciled discharge medications provided to patient and/or family        **Please Note: This note may have been constructed using a voice recognition system**

## 2023-05-27 NOTE — PLAN OF CARE
Problem: Potential for Falls  Goal: Patient will remain free of falls  Description: INTERVENTIONS:  - Educate patient/family on patient safety including physical limitations  - Instruct patient to call for assistance with activity   - Consult OT/PT to assist with strengthening/mobility   - Keep Call bell within reach  - Keep bed low and locked with side rails adjusted as appropriate  - Keep care items and personal belongings within reach  - Initiate and maintain comfort rounds  - Make Fall Risk Sign visible to staff  - Offer Toileting every 2 Hours, in advance of need  - Initiate/Maintain bed alarm  - Obtain necessary fall risk management equipment: socks  - Apply yellow socks and bracelet for high fall risk patients  - Consider moving patient to room near nurses station  Outcome: Progressing     Problem: PAIN - ADULT  Goal: Verbalizes/displays adequate comfort level or baseline comfort level  Description: Interventions:  - Encourage patient to monitor pain and request assistance  - Assess pain using appropriate pain scale  - Administer analgesics based on type and severity of pain and evaluate response  - Implement non-pharmacological measures as appropriate and evaluate response  - Consider cultural and social influences on pain and pain management  - Notify physician/advanced practitioner if interventions unsuccessful or patient reports new pain  Outcome: Progressing     Problem: INFECTION - ADULT  Goal: Absence or prevention of progression during hospitalization  Description: INTERVENTIONS:  - Assess and monitor for signs and symptoms of infection  - Monitor lab/diagnostic results  - Monitor all insertion sites, i e  indwelling lines, tubes, and drains  - Monitor endotracheal if appropriate and nasal secretions for changes in amount and color  - [place] appropriate cooling/warming therapies per order  - Administer medications as ordered  - Instruct and encourage patient and family to use good hand hygiene technique  - Identify and instruct in appropriate isolation precautions for identified infection/condition  Outcome: Progressing  Goal: Absence of fever/infection during neutropenic period  Description: INTERVENTIONS:  - Monitor WBC    Outcome: Progressing     Problem: SAFETY ADULT  Goal: Patient will remain free of falls  Description: INTERVENTIONS:  - Educate patient/family on patient safety including physical limitations  - Instruct patient to call for assistance with activity   - Consult OT/PT to assist with strengthening/mobility   - Keep Call bell within reach  - Keep bed low and locked with side rails adjusted as appropriate  - Keep care items and personal belongings within reach  - Initiate and maintain comfort rounds  - Make Fall Risk Sign visible to staff  - Offer Toileting every 2 Hours, in advance of need  - Initiate/Maintain bed alarm  - Obtain necessary fall risk management equipment: socks  - Apply yellow socks and bracelet for high fall risk patients  - Consider moving patient to room near nurses station  Outcome: Progressing  Goal: Maintain or return to baseline ADL function  Description: INTERVENTIONS:  -  Assess patient's ability to carry out ADLs; assess patient's baseline for ADL function and identify physical deficits which impact ability to perform ADLs (bathing, care of mouth/teeth, toileting, grooming, dressing, etc )  - Assess/evaluate cause of self-care deficits   - Assess range of motion  - Assess patient's mobility; develop plan if impaired  - Assess patient's need for assistive devices and provide as appropriate  - Encourage maximum independence but intervene and supervise when necessary  - Involve family in performance of ADLs  - Assess for home care needs following discharge   - Consider OT consult to assist with ADL evaluation and planning for discharge  - Provide patient education as appropriate  Outcome: Progressing  Goal: Maintains/Returns to pre admission functional level  Description: INTERVENTIONS:  - Perform BMAT or MOVE assessment daily    - Set and communicate daily mobility goal to care team and patient/family/caregiver  - Collaborate with rehabilitation services on mobility goals if consulted  - Perform Range of Motion 3 times a day  - Reposition patient every 2 hours    - Dangle patient 3 times a day  - Stand patient 3 times a day  - Ambulate patient 3 times a day  - Out of bed to chair 3 times a day   - Out of bed for meals 3 times a day  - Out of bed for toileting  - Record patient progress and toleration of activity level   Outcome: Progressing

## 2023-05-27 NOTE — ASSESSMENT & PLAN NOTE
Patient presents with progressively worsening shortness of breath over the last couple of months  Now only able to ambulate couple of feet prior to requiring rest   · Hypoxic in the 80s today, currently requiring 4 L via nasal cannula  · Lungs clear to auscultation, no rales/wheezing  · CTA chest- no PE  Innumerable large nodules/masses throughout both lung fields, significantly increased in size and number when compared with the prior CT study compatible with significantly progressed metastatic disease  · Suspect secondary to progression of metastatic disease  Allergies may also be contributing as patient reports scratchy throat and nasal congestion  · Will trial nebulizers for symptomatic relief  · Claritin and Flonase for allergic symptoms  · Dr Andrea Fregoso spoke with pt and family regarding progression of metastatic disease  Pt understanding and transitioning to home hospice    · Oxygen concentrator sent to patient's house, 4L via NC  · Pt with symptomatic relief with nebulizers, nebulizer machine and nebs also to be sent to house through hospice

## 2023-05-27 NOTE — ASSESSMENT & PLAN NOTE
Lab Results   Component Value Date    HGBA1C 6 4 (H) 04/14/2023       Recent Labs     05/26/23  1639 05/26/23  2112 05/27/23  0712 05/27/23  1043   POCGLU 174* 215* 186* 212*       Blood Sugar Average: Last 72 hrs:  (P) 183  75Home regimen: Januvia and metformin  Hold oral antihyperglycemic's while inpatient  Start sliding scale insulin  Diabetic diet

## 2023-05-27 NOTE — ASSESSMENT & PLAN NOTE
UA with possible infection   Urine culture growing E  coli  Immunosuppressed due to chemotherapy  Received 3 days of IV ceftriaxone, discharge on 2 more days of cefadroxil

## 2023-05-27 NOTE — ASSESSMENT & PLAN NOTE
Continue lisinopril and Lasix  Discontinue amlodipine, pt's BP on softer side and was started due to chemo-associated HTN, now going on hospice

## 2023-05-27 NOTE — ASSESSMENT & PLAN NOTE
Patient presents with progressively worsening shortness of breath over the last couple of months  Now only able to ambulate couple of feet prior to requiring rest   · Hypoxic in the 80s today, currently requiring 4 L via nasal cannula  · Lungs clear to auscultation, no rales/wheezing  · CTA chest- no PE  Innumerable large nodules/masses throughout both lung fields, significantly increased in size and number when compared with the prior CT study compatible with significantly progressed metastatic disease  · Suspect secondary to progression of metastatic disease  Allergies may also be contributing as patient reports scratchy throat and nasal congestion  · Will trial nebulizers for symptomatic relief  · Claritin and Flonase for allergic symptoms  · Dr Anitra Jeans spoke with pt and family regarding progression of metastatic disease  Pt understanding and transitioning to home hospice    · Oxygen concentrator sent to patient's house, 4L via NC  · Pt with symptomatic relief with nebulizers, nebulizer machine and nebs also sent to house

## 2023-05-27 NOTE — ASSESSMENT & PLAN NOTE
Lab Results   Component Value Date    HGBA1C 6 4 (H) 04/14/2023       Recent Labs     05/26/23  2112 05/27/23  0712 05/27/23  1043 05/27/23  1615   POCGLU 215* 186* 212* 233*       Blood Sugar Average: Last 72 hrs:  (P) 189 [id number] regimen: Januvia and metformin  Hold oral antihyperglycemic's while inpatient  Start sliding scale insulin  Diabetic diet  Resume home regimen at patients request

## 2023-05-27 NOTE — ASSESSMENT & PLAN NOTE
Pt with hemoglobin 6 3 this morning, fatigued  No evidence of active bleed, denies melena/hematochezia and other bleeds  Suspect secondary to anemia of chronic disease  Give 1 unit pRBC  Plan to discharge to home hospice after blood transfusion

## 2023-05-28 ENCOUNTER — TELEPHONE (OUTPATIENT)
Dept: OTHER | Facility: OTHER | Age: 68
End: 2023-05-28

## 2023-05-28 ENCOUNTER — TELEPHONE (OUTPATIENT)
Dept: PALLIATIVE MEDICINE | Facility: CLINIC | Age: 68
End: 2023-05-28

## 2023-05-28 DIAGNOSIS — Z51.5 PALLIATIVE CARE PATIENT: ICD-10-CM

## 2023-05-28 DIAGNOSIS — C54.1 ENDOMETRIAL CANCER (HCC): Primary | ICD-10-CM

## 2023-05-28 RX ORDER — OXYCODONE HYDROCHLORIDE 5 MG/1
5 TABLET ORAL EVERY 4 HOURS PRN
Qty: 20 TABLET | Refills: 0 | Status: SHIPPED | OUTPATIENT
Start: 2023-05-28

## 2023-05-28 NOTE — NURSING NOTE
Education and discharge instructions given to patient via Teri Garcia LPN  RN, luis Teague accessed port in right chest wall  PCA took patient downstairs  via wheelchair, accompanied by family

## 2023-05-28 NOTE — TELEPHONE ENCOUNTER
Pharmacy requesting a call back from the on-call provider in regards to the patient's prescription for Oxycodone  Pharmacy stated tablets are unavailable and are wondering if they could be changed to capsules instead  On-call was paged and message was received

## 2023-05-28 NOTE — TELEPHONE ENCOUNTER
Spoke with Tommy Jefferson from TE[facility]  Discussed that oxycodone 5mg tabs were unavailable at this time and Capsules are only in stock  PDMP reviewed and old records indicate patient has been on Oxycodone 5mg in the past with tolerance  I also tried to contact [address] [place] [address] Tata Castillo) per prior Palliative Notes, however, unable to reach anyone regarding whether they may have ordered medications for patient on the hospice plan of care  Therefore, will send in new script for Oxycodone 5mg Capsules to be taken every 4 hours as needed for pain or shortness of breath to bridge to hospice plan of care medications  Discussed with pharmacy to Cancel existing Oxycodone Tablet order      Mauri Todd DO  HPM Fellow

## 2023-05-30 LAB
BACTERIA BLD CULT: NORMAL
BACTERIA BLD CULT: NORMAL

## 2023-05-30 NOTE — UTILIZATION REVIEW
NOTIFICATION OF ADMISSION DISCHARGE   This is a Notification of Discharge from [address]  Please be advised that this patient has been discharge from our facility  Below you will find the admission and discharge date and time including the patient’s disposition  UTILIZATION REVIEW CONTACT:  Anca Krishnan  Utilization   Network Utilization Review Department  Phone: [phone] carefully listen to the prompts  All voicemails are confidential   Email: [email or web address]     ADMISSION INFORMATION  PRESENTATION DATE: 5/25/2023  9:42 AM  OBERVATION ADMISSION DATE:   INPATIENT ADMISSION DATE: 5/25/23  1:06 PM   DISCHARGE DATE: 5/27/2023  8:00 PM   DISPOSITION:Home/Self Care    IMPORTANT INFORMATION:  Send all requests for admission clinical reviews, approved or denied determinations and any other requests to dedicated fax number below belonging to the campus where the patient is receiving treatment   List of dedicated fax numbers:  [address] DENIALS (Administrative/Medical Necessity) [phone]   [address] (Maternity/NICU/Pediatrics) [phone]   [facility] [phone]   [facility] [phone]   Anne Becerra [phone]   220 [facility] [phone]   [address] [place] [address] [phone]   [address] [place] [address] 119 [phone]   [facility]  [phone]   4055 [facility] [phone]   412 [facility] 850 [address] [facility] [phone]

## 2023-05-30 NOTE — CASE MANAGEMENT
Case Management Progress Note    Patient name Yaima Hand  Location /-01 MRN [id number]  : 1955 Date 2023       LOS (days): 2  Geometric Mean LOS (GMLOS) (days): 3 50  Days to GMLOS:1 2        OBJECTIVE:        Current admission status: Inpatient  Preferred Pharmacy:   [place] [id number]  Phone: [phone] Fax: [phone]    [facility] [address]  [address] [facility] [address]  Phone: [phone] Fax: [phone]    Primary Care Provider: Francesco Nielson DO    Primary Insurance: BLUE CROSS  Secondary Insurance: MEDICARE    PROGRESS NOTE:    Notification made to OP CM Handoff: [facility] OP CM regarding discharge planning and disposition  CM spoke to Bello Ortiz in PCP office to inform of dc on   [facility]ing:  I discussed with the patient the risks of golimumab including but not limited to myelosuppression, immunosuppression, autoimmune hepatitis, demyelinating diseases, lymphoma, and serious infections.  The patient understands that monitoring is required including a PPD at baseline and must alert us or the primary physician if symptoms of infection or other concerning signs are noted.

## 2023-06-15 NOTE — H&P
For questions/concerns on this patient, please reach out to the following:  [facility]-OB GYN-GynOnc- Resident/AP      H&P Exam - Gynecology Oncology  Jennifer Millan 78 y o  female MRN: [id number]  Unit/Bed#: OR [place] Encounter: [id number]        Assessment/Plan   * Endometrial cancer [facility])  Assessment & Plan  To OR for RA-TLH, unilateraly salpingo-oophorectomy, lymph node dissection  Risks and benefits of surgical management reviewed with patient     58-year-old with stage IV B high-grade endometrial cancer, liver, retroperitoneal lymph node, lung metastatic disease who has received 3 cycles of carboplatin and Taxol chemotherapy  Treatment course has been complicated by Taxol related arthralgias/neuropathy  I reviewed her CT of the chest abdomen pelvis, CBC, CMP  Her performance status is 1     1  I reviewed the CT findings in detail  She has stable disease  2  We discussed the risks and benefits of performing robotic assisted total laparoscopic hysterectomy and bilateral salpingo-oophorectomy and length  She understands that this would be largely a palliative procedure although there is some data suggesting benefit to removing the primary lesion  Although she had previously signed consent for a robotic assisted procedure, I reviewed the risks and benefits of the surgery  She understands the risks and benefits of the operation agrees to proceed as outlined  3  Will plan for cycle 4  Of carboplatin and Taxol and schedule surgery approximately 3 weeks post cycle 4 to allow her counts to recover adequately  4  Postoperatively, will add bevacizumab to carboplatin and Taxol  History of Present Illness     HPI:  Per Dr Mckeon:  "Returns for pre chemotherapy evaluation and treatment discussion  She has received 3 cycles of carboplatin and Taxol  The Taxol required dose reduction due to neuropathy/arthralgias in her lower extremities  She is able to perform her activities of daily living    Overall, the chemotherapy has not significantly affected her activities of daily living  She does note some restless leg symptoms at night  Labs from 4/18/2022 reveal a total white blood cell count 2 8, hemoglobin 9 3 grams/deciliter and a platelet count of 087864  Blood glucose was 250 mg/dL  Serum creatinine 0 64 mg/dL  The remainder of her CMP was normal   She had a CT scan of the chest abdomen pelvis after 3 cycles which revealed stable disease  I also reviewed genomic testing data  Mismatch repair was intact  No other interval change in her medications or medical history since her last visit "    Today, patient is feeling well  Denies fevers or chills  Denies chest pain, shortness of breath, or calf tenderness  No acute concerns addressed  Oncology History   Endometrial cancer ([facility] [address] )   1/26/2022 Initial Diagnosis    [facility]     2/17/2022 -  Cancer Staged    Staging form: Corpus Uteri - Carcinoma, AJCC 8th Edition  - Clinical: FIGO Stage IVB (cM1) - Signed by Marie Mixon MD on 2/17/2022  Histologic grade (G): G3  Histologic grading system: 3 grade system       2/24/2022 -  Chemotherapy    Taxol 175 mg/m2 and carboplatin AUC 6 every 21 days  Taxol dose-reduced to 135 mg/m2 with cycle 2 due to arthralgias/myalgias  She has received 3 cycles     3/11/2022 Genomic Testing    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, ER/OH positive, microsatellite stable  No other targeted therapy opportunities  Review of Systems   Constitutional: Negative for chills and fever  HENT: Negative for congestion and sinus pain  Eyes: Negative for discharge and redness  Respiratory: Negative for cough, chest tightness and shortness of breath  Cardiovascular: Negative for chest pain, palpitations and leg swelling  Gastrointestinal: Negative for abdominal pain, nausea and vomiting  Genitourinary: Negative for dysuria and pelvic pain  Skin: Negative for color change, pallor, rash and wound  Neurological: Negative for light-headedness and numbness  Psychiatric/Behavioral: Negative for behavioral problems and confusion  Historical Information   Past Medical History:   Diagnosis Date    Anxiety     Arthritis     Back pain     Cancer ([place] [address] )     Depression     Diabetes mellitus ([place] [address] )     Disease of thyroid gland     Endometrial cancer (HCC)     GERD (gastroesophageal reflux disease)     Hyperlipidemia associated with type 2 diabetes mellitus ([place] [address] )     Hypertension     Morbid obesity with BMI of 40 0-44 9, adult ([place] [address] )     Type 2 diabetes mellitus without complication ([place] [address] )     Urinary incontinence     Wears glasses      Past Surgical History:   Procedure Laterality Date    CATARACT EXTRACTION W/ INTRAOCULAR LENS IMPLANT Bilateral     CHOLECYSTECTOMY OPEN      IR PORT PLACEMENT  2022    SALPINGOOPHORECTOMY N/A     only had one removed and not sure which    TONSILLECTOMY       OB History    Para Term  AB Living   1 1           SAB IAB Ectopic Multiple Live Births                  # Outcome Date GA Lbr [place]/2nd Weight Sex Delivery Anes PTL Lv   1 Para              History reviewed  No pertinent family history    Social History   Social History     Substance and Sexual Activity   Alcohol Use Never     Social History     Substance and Sexual Activity   Drug Use Never     Social History     Tobacco Use   Smoking Status Never Smoker   Smokeless Tobacco Never Used       Meds/Allergies   Medications Prior to Admission   Medication    acetaminophen (TYLENOL) 650 mg CR tablet    fexofenadine (ALLEGRA) 180 MG tablet    furosemide (LASIX) 40 mg tablet    gabapentin (NEURONTIN) 300 mg capsule    ibuprofen (MOTRIN) 200 mg tablet    lidocaine-prilocaine (EMLA) cream    lisinopril (ZESTRIL) 20 mg tablet    LORazepam (ATIVAN) 0 5 mg tablet    magnesium oxide (MAG-OX) 400 mg    metFORMIN (GLUCOPHAGE) 1000 MG tablet    omeprazole (PriLOSEC) 20 mg delayed release capsule  ondansetron (ZOFRAN) 8 mg tablet    Rosuvastatin Calcium 20 MG CPSP    sertraline (ZOLOFT) 25 mg tablet    sitaGLIPtin (JANUVIA) 100 mg tablet    Toujeo SoloStar 300 units/mL CONCENTRATED U-300 injection pen (1-unit dial)    Bacillus Coagulans-Inulin (Probiotic) 1-250 BILLION-MG CAPS    BD Pen Needle Yoana 2nd Gen 32G X 4 MM MISC    Contour Next Test test strip    Microlet Lancets MISC    oxyCODONE (Roxicodone) 5 immediate release tablet     Allergies   Allergen Reactions    Empagliflozin Other (See Comments)     Yeast infection    Exenatide Nausea Only    Glipizide Other (See Comments)     hypoglycemic    Repaglinide Nausea Only       Objective     /63   Pulse 96   Temp 99 4 °F (37 4 °C) (Tympanic)   Resp 20   Ht 5' 3" (1 6 m)   Wt 105 kg (232 lb)   SpO2 95%   BMI 41 10 kg/m²     No intake/output data recorded  No intake/output data recorded  Lab Results   Component Value Date    WBC 5 7 05/23/2022    HGB 8 7 (L) 05/23/2022    HCT 28 1 (L) 05/23/2022    MCV 87 05/23/2022     05/23/2022       Lab Results   Component Value Date    K 4 1 05/23/2022    CO2 24 05/23/2022    CL 99 05/23/2022    BUN 19 05/23/2022    CREATININE 0 88 05/23/2022       Physical Exam  Vitals reviewed  Exam conducted with a chaperone present  Constitutional:       General: She is not in acute distress  Appearance: She is obese  HENT:      Head: Normocephalic  Right Ear: External ear normal       Left Ear: External ear normal    Eyes:      General:         Right eye: No discharge  Left eye: No discharge  Conjunctiva/sclera: Conjunctivae normal    Cardiovascular:      Rate and Rhythm: Normal rate and regular rhythm  Pulses: Normal pulses  Heart sounds: No murmur heard  No friction rub  No gallop  Pulmonary:      Effort: Pulmonary effort is normal  No respiratory distress  Breath sounds: Normal breath sounds  Abdominal:      General: Abdomen is flat   There is no distension  Palpations: Abdomen is soft  Tenderness: There is no abdominal tenderness  There is no guarding  Musculoskeletal:         General: No swelling or tenderness  Normal range of motion  Cervical back: Normal range of motion  Right lower leg: No edema  Left lower leg: No edema  Comments: B/l Maikol's sign negative   Skin:     General: Skin is warm and dry  Capillary Refill: Capillary refill takes less than 2 seconds  Neurological:      Mental Status: Mental status is at baseline  Psychiatric:         Mood and Affect: Mood normal          Behavior: Behavior normal          Imaging: I have personally reviewed pertinent reports  EKG, Pathology, and Other Studies: I have personally reviewed pertinent reports          Code Status: No Order    Yumiko Murillo MD  5/31/2022  6:50 AM no > continue with home pantoprazole

## 2023-07-24 PROBLEM — N39.0 UTI (URINARY TRACT INFECTION): Status: RESOLVED | Noted: 2023-05-25 | Resolved: 2023-07-24

## 2025-02-15 NOTE — PROGRESS NOTES
8-25-22  [facility] new oral chemo start-Lenvima // [facility] required      Patient has been enrolled with    For patient Estuardo Bullock [birth year]  Patient has been enrolled with [address] [place] [address]  ID [id number]  BIN# [id number]  [facility]# [id number]  COPAY $ 0  w/ 40,000 yr
22

## 2025-05-10 NOTE — ASSESSMENT & PLAN NOTE
26-year-old with stage IVB high-grade endometrial cancer, currently receiving palliative therapy with Keytruda and Lenvima 4mg  She is feeling well and has no current concerns  She is on multiple anti-hypertensives, and reports good BP control with the 4mg Lenvima dose (BPs in the 120-130/60-70 range recently)  Her PS is 0  The patient inquires about increasing her Lenvima dose now that her BP has been controlled  I advised her to continue with 4mg dose as she is currently stable on this medication and I am concerned that she will experience hypertensive urgency again  I advised her to discuss with Dr Glinda Oppenheim at her next visit in 3 weeks  She was agreeable  Labs will be reviewed prior to next treatment  CT will be performed prior to her next office visit 
0

## (undated) DEVICE — LAPAROSCOPIC TROCAR SLEEVE/SINGLE USE: Brand: KII® OPTICAL ACCESS SYSTEM

## (undated) DEVICE — UTERINE MANIPULATOR RUMI 5.1 X 6 CM

## (undated) DEVICE — HEMOSTATIC MATRIX SURGIFLO 8ML W/THROMBIN

## (undated) DEVICE — AIRSEAL TUBE SMOKE EVAC LUMENX3 FILTERED

## (undated) DEVICE — PREMIUM DRY TRAY LF: Brand: MEDLINE INDUSTRIES, INC.

## (undated) DEVICE — LUBRICANT INST ELECTROLUBE ANTISTK WO PAD

## (undated) DEVICE — ENDO PADDLE RETRACT

## (undated) DEVICE — SYRINGE 50ML LL

## (undated) DEVICE — INTENDED FOR TISSUE SEPARATION, AND OTHER PROCEDURES THAT REQUIRE A SHARP SURGICAL BLADE TO PUNCTURE OR CUT.: Brand: BARD-PARKER SAFETY BLADES SIZE 15, STERILE

## (undated) DEVICE — MAYO STAND COVER: Brand: CONVERTORS

## (undated) DEVICE — SUT MONOCRYL 4-0 PS-2 27 IN Y426H

## (undated) DEVICE — CHLORHEXIDINE 4PCT 4 OZ

## (undated) DEVICE — TROCAR PORT ACCESS 12 X120MM W/BLDLS OPTICAL TIP AIRSEAL

## (undated) DEVICE — CANNULA SEAL

## (undated) DEVICE — CHLORAPREP HI-LITE 26ML ORANGE

## (undated) DEVICE — ADHESIVE SKIN HIGH VISCOSITY EXOFIN 1ML

## (undated) DEVICE — TRAY FOLEY 16FR URIMETER SILICONE SURESTEP

## (undated) DEVICE — SUT STRATAFIX SPIRAL 2-0 CT-1 30 CM SXPP1B410

## (undated) DEVICE — MONOPOLAR CURVED SCISSORS: Brand: ENDOWRIST

## (undated) DEVICE — GLOVE INDICATOR PI UNDERGLOVE SZ 8 BLUE

## (undated) DEVICE — PACKING VAGINAL 2 IN

## (undated) DEVICE — CURITY PLAIN PACKING STRIP: Brand: CURITY

## (undated) DEVICE — VESSEL SEALER EXTEND: Brand: ENDOWRIST

## (undated) DEVICE — ANTIBACTERIAL VIOLET BRAIDED (POLYGLACTIN 910), SYNTHETIC ABSORBABLE SUTURE: Brand: COATED VICRYL

## (undated) DEVICE — NEEDLE 25G X 1 1/2

## (undated) DEVICE — GLOVE INDICATOR PI UNDERGLOVE SZ 7 BLUE

## (undated) DEVICE — COLUMN DRAPE

## (undated) DEVICE — ARM DRAPE

## (undated) DEVICE — TROCAR PORT ACCESS 5 X120MML W/BLDLS OPTICAL TIP AIRSEAL

## (undated) DEVICE — SURGIFLO ENDOSCOPIC APPICATOR: Brand: ETHICON

## (undated) DEVICE — STERILE CYSTO PACK: Brand: CARDINAL HEALTH

## (undated) DEVICE — BLADELESS OBTURATOR: Brand: WECK VISTA

## (undated) DEVICE — TISSUE RETRIEVAL SYSTEM: Brand: INZII RETRIEVAL SYSTEM

## (undated) DEVICE — GLOVE PI ULTRA TOUCH SZ.7.5

## (undated) DEVICE — KIT, BETHLEHEM ROBOTIC PROST: Brand: CARDINAL HEALTH

## (undated) DEVICE — TIP COVER ACCESSORY

## (undated) DEVICE — INTENDED FOR TISSUE SEPARATION, AND OTHER PROCEDURES THAT REQUIRE A SHARP SURGICAL BLADE TO PUNCTURE OR CUT.: Brand: BARD-PARKER SAFETY BLADES SIZE 11, STERILE

## (undated) DEVICE — PERINEAL PACKING: Brand: DEROYAL

## (undated) DEVICE — 1820 FOAM BLOCK NEEDLE COUNTER: Brand: DEVON

## (undated) DEVICE — VISUALIZATION SYSTEM: Brand: CLEARIFY

## (undated) DEVICE — PROGRASP FORCEPS: Brand: ENDOWRIST